# Patient Record
Sex: MALE | Race: WHITE | NOT HISPANIC OR LATINO | Employment: FULL TIME | URBAN - METROPOLITAN AREA
[De-identification: names, ages, dates, MRNs, and addresses within clinical notes are randomized per-mention and may not be internally consistent; named-entity substitution may affect disease eponyms.]

---

## 2017-01-27 ENCOUNTER — APPOINTMENT (OUTPATIENT)
Dept: LAB | Facility: CLINIC | Age: 42
End: 2017-01-27
Payer: COMMERCIAL

## 2017-01-27 ENCOUNTER — ALLSCRIPTS OFFICE VISIT (OUTPATIENT)
Dept: OTHER | Facility: OTHER | Age: 42
End: 2017-01-27

## 2017-01-27 ENCOUNTER — GENERIC CONVERSION - ENCOUNTER (OUTPATIENT)
Dept: OTHER | Facility: OTHER | Age: 42
End: 2017-01-27

## 2017-01-27 DIAGNOSIS — E06.3 AUTOIMMUNE THYROIDITIS: ICD-10-CM

## 2017-01-27 LAB
T4 FREE SERPL-MCNC: 0.91 NG/DL (ref 0.76–1.46)
TSH SERPL DL<=0.05 MIU/L-ACNC: 6.76 UIU/ML (ref 0.36–3.74)

## 2017-01-27 PROCEDURE — 36415 COLL VENOUS BLD VENIPUNCTURE: CPT

## 2017-01-27 PROCEDURE — 84443 ASSAY THYROID STIM HORMONE: CPT

## 2017-01-27 PROCEDURE — 84439 ASSAY OF FREE THYROXINE: CPT

## 2017-02-23 ENCOUNTER — ALLSCRIPTS OFFICE VISIT (OUTPATIENT)
Dept: OTHER | Facility: OTHER | Age: 42
End: 2017-02-23

## 2017-02-27 ENCOUNTER — TRANSCRIBE ORDERS (OUTPATIENT)
Dept: ADMINISTRATIVE | Facility: HOSPITAL | Age: 42
End: 2017-02-27

## 2017-02-27 ENCOUNTER — APPOINTMENT (OUTPATIENT)
Dept: LAB | Facility: HOSPITAL | Age: 42
End: 2017-02-27
Payer: COMMERCIAL

## 2017-02-27 DIAGNOSIS — E03.9 HYPOTHYROIDISM: ICD-10-CM

## 2017-02-27 DIAGNOSIS — E06.3 AUTOIMMUNE THYROIDITIS: ICD-10-CM

## 2017-02-27 LAB
T4 FREE SERPL-MCNC: 0.84 NG/DL (ref 0.76–1.46)
TSH SERPL DL<=0.05 MIU/L-ACNC: 5.72 UIU/ML (ref 0.36–3.74)

## 2017-02-27 PROCEDURE — 84439 ASSAY OF FREE THYROXINE: CPT

## 2017-02-27 PROCEDURE — 84443 ASSAY THYROID STIM HORMONE: CPT

## 2017-02-27 PROCEDURE — 36415 COLL VENOUS BLD VENIPUNCTURE: CPT

## 2017-02-28 ENCOUNTER — GENERIC CONVERSION - ENCOUNTER (OUTPATIENT)
Dept: OTHER | Facility: OTHER | Age: 42
End: 2017-02-28

## 2017-04-18 ENCOUNTER — APPOINTMENT (OUTPATIENT)
Dept: LAB | Facility: CLINIC | Age: 42
End: 2017-04-18
Payer: COMMERCIAL

## 2017-04-18 ENCOUNTER — GENERIC CONVERSION - ENCOUNTER (OUTPATIENT)
Dept: OTHER | Facility: OTHER | Age: 42
End: 2017-04-18

## 2017-04-18 DIAGNOSIS — E06.3 AUTOIMMUNE THYROIDITIS: ICD-10-CM

## 2017-04-18 LAB — TSH SERPL DL<=0.05 MIU/L-ACNC: 2.63 UIU/ML (ref 0.36–3.74)

## 2017-04-18 PROCEDURE — 36415 COLL VENOUS BLD VENIPUNCTURE: CPT

## 2017-04-18 PROCEDURE — 84443 ASSAY THYROID STIM HORMONE: CPT

## 2017-05-03 ENCOUNTER — HOSPITAL ENCOUNTER (OUTPATIENT)
Dept: NON INVASIVE DIAGNOSTICS | Facility: CLINIC | Age: 42
Discharge: HOME | End: 2017-05-03
Payer: COMMERCIAL

## 2017-05-03 DIAGNOSIS — I83.811 VARICOSE VEINS OF RIGHT LOWER EXTREMITY WITH PAIN: ICD-10-CM

## 2017-05-03 PROCEDURE — 93970 EXTREMITY STUDY: CPT

## 2017-05-23 DIAGNOSIS — I87.2 VENOUS INSUFFICIENCY (CHRONIC) (PERIPHERAL): ICD-10-CM

## 2017-05-23 DIAGNOSIS — I83.811 VARICOSE VEINS OF RIGHT LOWER EXTREMITY WITH PAIN: ICD-10-CM

## 2017-05-23 DIAGNOSIS — I83.893 VARICOSE VEINS OF BILATERAL LOWER EXTREMITIES WITH OTHER COMPLICATIONS: ICD-10-CM

## 2017-06-06 ENCOUNTER — ALLSCRIPTS OFFICE VISIT (OUTPATIENT)
Dept: OTHER | Facility: OTHER | Age: 42
End: 2017-06-06

## 2017-07-18 ENCOUNTER — GENERIC CONVERSION - ENCOUNTER (OUTPATIENT)
Dept: OTHER | Facility: OTHER | Age: 42
End: 2017-07-18

## 2017-07-24 ENCOUNTER — TRANSCRIBE ORDERS (OUTPATIENT)
Dept: ADMINISTRATIVE | Facility: HOSPITAL | Age: 42
End: 2017-07-24

## 2017-07-24 ENCOUNTER — APPOINTMENT (OUTPATIENT)
Dept: LAB | Facility: HOSPITAL | Age: 42
End: 2017-07-24
Payer: COMMERCIAL

## 2017-07-24 DIAGNOSIS — Z00.8 HEALTH EXAMINATION IN POPULATION SURVEY: Primary | ICD-10-CM

## 2017-07-24 DIAGNOSIS — Z00.8 HEALTH EXAMINATION IN POPULATION SURVEY: ICD-10-CM

## 2017-07-24 LAB
CHOLEST SERPL-MCNC: 223 MG/DL (ref 50–200)
EST. AVERAGE GLUCOSE BLD GHB EST-MCNC: 111 MG/DL
HBA1C MFR BLD: 5.5 % (ref 4.2–6.3)
HDLC SERPL-MCNC: 44 MG/DL (ref 40–60)
LDLC SERPL CALC-MCNC: 137 MG/DL (ref 0–100)
TRIGL SERPL-MCNC: 212 MG/DL

## 2017-07-24 PROCEDURE — 83036 HEMOGLOBIN GLYCOSYLATED A1C: CPT

## 2017-07-24 PROCEDURE — 80061 LIPID PANEL: CPT

## 2017-07-24 PROCEDURE — 36415 COLL VENOUS BLD VENIPUNCTURE: CPT

## 2017-08-03 ENCOUNTER — GENERIC CONVERSION - ENCOUNTER (OUTPATIENT)
Dept: OTHER | Facility: OTHER | Age: 42
End: 2017-08-03

## 2017-08-08 DIAGNOSIS — E06.3 AUTOIMMUNE THYROIDITIS: ICD-10-CM

## 2017-08-08 DIAGNOSIS — I83.893 VARICOSE VEINS OF BILATERAL LOWER EXTREMITIES WITH OTHER COMPLICATIONS: ICD-10-CM

## 2017-08-26 ENCOUNTER — GENERIC CONVERSION - ENCOUNTER (OUTPATIENT)
Dept: OTHER | Facility: OTHER | Age: 42
End: 2017-08-26

## 2017-08-26 ENCOUNTER — APPOINTMENT (EMERGENCY)
Dept: CT IMAGING | Facility: HOSPITAL | Age: 42
End: 2017-08-26
Payer: COMMERCIAL

## 2017-08-26 ENCOUNTER — HOSPITAL ENCOUNTER (EMERGENCY)
Facility: HOSPITAL | Age: 42
Discharge: HOME | End: 2017-08-26
Attending: EMERGENCY MEDICINE | Admitting: EMERGENCY MEDICINE
Payer: COMMERCIAL

## 2017-08-26 VITALS
TEMPERATURE: 98.5 F | RESPIRATION RATE: 18 BRPM | HEART RATE: 73 BPM | BODY MASS INDEX: 38.43 KG/M2 | WEIGHT: 290 LBS | HEIGHT: 73 IN | DIASTOLIC BLOOD PRESSURE: 95 MMHG | OXYGEN SATURATION: 97 % | SYSTOLIC BLOOD PRESSURE: 132 MMHG

## 2017-08-26 DIAGNOSIS — K42.9 UMBILICAL HERNIA: Primary | ICD-10-CM

## 2017-08-26 LAB
ALBUMIN SERPL BCP-MCNC: 4 G/DL (ref 3.5–5)
ALP SERPL-CCNC: 81 U/L (ref 46–116)
ALT SERPL W P-5'-P-CCNC: 74 U/L (ref 12–78)
ANION GAP SERPL CALCULATED.3IONS-SCNC: 9 MMOL/L (ref 4–13)
AST SERPL W P-5'-P-CCNC: 40 U/L (ref 5–45)
BASOPHILS # BLD AUTO: 0.04 THOUSANDS/ΜL (ref 0–0.1)
BASOPHILS NFR BLD AUTO: 0 % (ref 0–1)
BILIRUB SERPL-MCNC: 0.5 MG/DL (ref 0.2–1)
BILIRUB UR QL STRIP: NEGATIVE
BUN SERPL-MCNC: 13 MG/DL (ref 5–25)
CALCIUM SERPL-MCNC: 9.3 MG/DL (ref 8.3–10.1)
CHLORIDE SERPL-SCNC: 101 MMOL/L (ref 100–108)
CLARITY UR: CLEAR
CO2 SERPL-SCNC: 29 MMOL/L (ref 21–32)
COLOR UR: YELLOW
CREAT SERPL-MCNC: 1.17 MG/DL (ref 0.6–1.3)
EOSINOPHIL # BLD AUTO: 0.12 THOUSAND/ΜL (ref 0–0.61)
EOSINOPHIL NFR BLD AUTO: 1 % (ref 0–6)
ERYTHROCYTE [DISTWIDTH] IN BLOOD BY AUTOMATED COUNT: 12.6 % (ref 11.6–15.1)
GFR SERPL CREATININE-BSD FRML MDRD: 78 ML/MIN/1.73SQ M
GLUCOSE SERPL-MCNC: 113 MG/DL (ref 65–140)
GLUCOSE UR STRIP-MCNC: NEGATIVE MG/DL
HCT VFR BLD AUTO: 40.7 % (ref 36.5–49.3)
HGB BLD-MCNC: 14.1 G/DL (ref 12–17)
HGB UR QL STRIP.AUTO: NEGATIVE
KETONES UR STRIP-MCNC: NEGATIVE MG/DL
LACTATE SERPL-SCNC: 1.3 MMOL/L (ref 0.5–2)
LEUKOCYTE ESTERASE UR QL STRIP: NEGATIVE
LIPASE SERPL-CCNC: 128 U/L (ref 73–393)
LYMPHOCYTES # BLD AUTO: 2.09 THOUSANDS/ΜL (ref 0.6–4.47)
LYMPHOCYTES NFR BLD AUTO: 22 % (ref 14–44)
MCH RBC QN AUTO: 29.4 PG (ref 26.8–34.3)
MCHC RBC AUTO-ENTMCNC: 34.6 G/DL (ref 31.4–37.4)
MCV RBC AUTO: 85 FL (ref 82–98)
MONOCYTES # BLD AUTO: 0.65 THOUSAND/ΜL (ref 0.17–1.22)
MONOCYTES NFR BLD AUTO: 7 % (ref 4–12)
NEUTROPHILS # BLD AUTO: 6.62 THOUSANDS/ΜL (ref 1.85–7.62)
NEUTS SEG NFR BLD AUTO: 69 % (ref 43–75)
NITRITE UR QL STRIP: NEGATIVE
NRBC BLD AUTO-RTO: 0 /100 WBCS
PH UR STRIP.AUTO: 6.5 [PH] (ref 4.5–8)
PLATELET # BLD AUTO: 187 THOUSANDS/UL (ref 149–390)
PMV BLD AUTO: 11.2 FL (ref 8.9–12.7)
POTASSIUM SERPL-SCNC: 3.9 MMOL/L (ref 3.5–5.3)
PROT SERPL-MCNC: 7.4 G/DL (ref 6.4–8.2)
PROT UR STRIP-MCNC: NEGATIVE MG/DL
RBC # BLD AUTO: 4.8 MILLION/UL (ref 3.88–5.62)
SODIUM SERPL-SCNC: 139 MMOL/L (ref 136–145)
SP GR UR STRIP.AUTO: 1.02 (ref 1–1.03)
UROBILINOGEN UR QL STRIP.AUTO: 0.2 E.U./DL
WBC # BLD AUTO: 9.55 THOUSAND/UL (ref 4.31–10.16)

## 2017-08-26 PROCEDURE — 74177 CT ABD & PELVIS W/CONTRAST: CPT

## 2017-08-26 PROCEDURE — 83690 ASSAY OF LIPASE: CPT | Performed by: EMERGENCY MEDICINE

## 2017-08-26 PROCEDURE — 36415 COLL VENOUS BLD VENIPUNCTURE: CPT | Performed by: EMERGENCY MEDICINE

## 2017-08-26 PROCEDURE — 81003 URINALYSIS AUTO W/O SCOPE: CPT | Performed by: EMERGENCY MEDICINE

## 2017-08-26 PROCEDURE — 99284 EMERGENCY DEPT VISIT MOD MDM: CPT

## 2017-08-26 PROCEDURE — 83605 ASSAY OF LACTIC ACID: CPT | Performed by: EMERGENCY MEDICINE

## 2017-08-26 PROCEDURE — 80053 COMPREHEN METABOLIC PANEL: CPT | Performed by: EMERGENCY MEDICINE

## 2017-08-26 PROCEDURE — 93005 ELECTROCARDIOGRAM TRACING: CPT | Performed by: EMERGENCY MEDICINE

## 2017-08-26 PROCEDURE — 85025 COMPLETE CBC W/AUTO DIFF WBC: CPT | Performed by: EMERGENCY MEDICINE

## 2017-08-26 RX ORDER — ACETAMINOPHEN 325 MG/1
975 TABLET ORAL ONCE
Status: COMPLETED | OUTPATIENT
Start: 2017-08-26 | End: 2017-08-26

## 2017-08-26 RX ORDER — LEVOTHYROXINE SODIUM 0.1 MG/1
100 TABLET ORAL DAILY
COMMUNITY
End: 2018-02-07

## 2017-08-26 RX ADMIN — IOHEXOL 100 ML: 350 INJECTION, SOLUTION INTRAVENOUS at 22:20

## 2017-08-26 RX ADMIN — ACETAMINOPHEN 975 MG: 325 TABLET ORAL at 22:33

## 2017-08-28 LAB
ATRIAL RATE: 92 BPM
P AXIS: 50 DEGREES
PR INTERVAL: 160 MS
QRS AXIS: 47 DEGREES
QRSD INTERVAL: 92 MS
QT INTERVAL: 374 MS
QTC INTERVAL: 462 MS
T WAVE AXIS: 43 DEGREES
VENTRICULAR RATE: 92 BPM

## 2017-09-05 ENCOUNTER — ALLSCRIPTS OFFICE VISIT (OUTPATIENT)
Dept: OTHER | Facility: OTHER | Age: 42
End: 2017-09-05

## 2017-09-06 ENCOUNTER — ALLSCRIPTS OFFICE VISIT (OUTPATIENT)
Dept: OTHER | Facility: OTHER | Age: 42
End: 2017-09-06

## 2017-09-23 ENCOUNTER — APPOINTMENT (OUTPATIENT)
Dept: LAB | Facility: HOSPITAL | Age: 42
End: 2017-09-23
Attending: SURGERY
Payer: COMMERCIAL

## 2017-09-23 DIAGNOSIS — I83.893 VARICOSE VEINS OF BILATERAL LOWER EXTREMITIES WITH OTHER COMPLICATIONS: ICD-10-CM

## 2017-09-23 DIAGNOSIS — I87.2 VENOUS INSUFFICIENCY (CHRONIC) (PERIPHERAL): ICD-10-CM

## 2017-09-23 DIAGNOSIS — E06.3 AUTOIMMUNE THYROIDITIS: ICD-10-CM

## 2017-09-23 LAB
ANION GAP SERPL CALCULATED.3IONS-SCNC: 9 MMOL/L (ref 4–13)
BUN SERPL-MCNC: 13 MG/DL (ref 5–25)
CALCIUM SERPL-MCNC: 9.2 MG/DL (ref 8.3–10.1)
CHLORIDE SERPL-SCNC: 103 MMOL/L (ref 100–108)
CO2 SERPL-SCNC: 25 MMOL/L (ref 21–32)
CREAT SERPL-MCNC: 1.04 MG/DL (ref 0.6–1.3)
ERYTHROCYTE [DISTWIDTH] IN BLOOD BY AUTOMATED COUNT: 12.3 % (ref 11.6–15.1)
GFR SERPL CREATININE-BSD FRML MDRD: 89 ML/MIN/1.73SQ M
GLUCOSE P FAST SERPL-MCNC: 101 MG/DL (ref 65–99)
HCT VFR BLD AUTO: 43.3 % (ref 36.5–49.3)
HGB BLD-MCNC: 15 G/DL (ref 12–17)
INR PPP: 0.92 (ref 0.86–1.16)
MCH RBC QN AUTO: 29.1 PG (ref 26.8–34.3)
MCHC RBC AUTO-ENTMCNC: 34.6 G/DL (ref 31.4–37.4)
MCV RBC AUTO: 84 FL (ref 82–98)
PLATELET # BLD AUTO: 207 THOUSANDS/UL (ref 149–390)
PMV BLD AUTO: 11.3 FL (ref 8.9–12.7)
POTASSIUM SERPL-SCNC: 4.8 MMOL/L (ref 3.5–5.3)
PROTHROMBIN TIME: 12.6 SECONDS (ref 12.1–14.4)
RBC # BLD AUTO: 5.15 MILLION/UL (ref 3.88–5.62)
SODIUM SERPL-SCNC: 137 MMOL/L (ref 136–145)
TSH SERPL DL<=0.05 MIU/L-ACNC: 3.24 UIU/ML (ref 0.36–3.74)
WBC # BLD AUTO: 8.09 THOUSAND/UL (ref 4.31–10.16)

## 2017-09-23 PROCEDURE — 85610 PROTHROMBIN TIME: CPT

## 2017-09-23 PROCEDURE — 80048 BASIC METABOLIC PNL TOTAL CA: CPT

## 2017-09-23 PROCEDURE — 36415 COLL VENOUS BLD VENIPUNCTURE: CPT

## 2017-09-23 PROCEDURE — 84443 ASSAY THYROID STIM HORMONE: CPT

## 2017-09-23 PROCEDURE — 85027 COMPLETE CBC AUTOMATED: CPT

## 2017-09-25 ENCOUNTER — ANESTHESIA EVENT (OUTPATIENT)
Dept: PERIOP | Facility: HOSPITAL | Age: 42
End: 2017-09-25
Payer: COMMERCIAL

## 2017-09-25 ENCOUNTER — GENERIC CONVERSION - ENCOUNTER (OUTPATIENT)
Dept: OTHER | Facility: OTHER | Age: 42
End: 2017-09-25

## 2017-09-26 ENCOUNTER — APPOINTMENT (OUTPATIENT)
Dept: NON INVASIVE DIAGNOSTICS | Facility: HOSPITAL | Age: 42
End: 2017-09-26
Payer: COMMERCIAL

## 2017-09-26 ENCOUNTER — ANESTHESIA (OUTPATIENT)
Dept: PERIOP | Facility: HOSPITAL | Age: 42
End: 2017-09-26
Payer: COMMERCIAL

## 2017-09-26 ENCOUNTER — HOSPITAL ENCOUNTER (OUTPATIENT)
Facility: HOSPITAL | Age: 42
Setting detail: OUTPATIENT SURGERY
Discharge: HOME | End: 2017-09-26
Attending: SURGERY | Admitting: SURGERY
Payer: COMMERCIAL

## 2017-09-26 VITALS
HEIGHT: 73 IN | RESPIRATION RATE: 16 BRPM | BODY MASS INDEX: 38.43 KG/M2 | HEART RATE: 76 BPM | TEMPERATURE: 98.2 F | SYSTOLIC BLOOD PRESSURE: 157 MMHG | OXYGEN SATURATION: 100 % | DIASTOLIC BLOOD PRESSURE: 77 MMHG | WEIGHT: 290 LBS

## 2017-09-26 DIAGNOSIS — I83.891 VARICOSE VEINS OF LOWER EXTREMITIES WITH COMPLICATIONS, RIGHT: ICD-10-CM

## 2017-09-26 PROCEDURE — 93971 EXTREMITY STUDY: CPT

## 2017-09-26 RX ORDER — SODIUM CHLORIDE, SODIUM LACTATE, POTASSIUM CHLORIDE, CALCIUM CHLORIDE 600; 310; 30; 20 MG/100ML; MG/100ML; MG/100ML; MG/100ML
INJECTION, SOLUTION INTRAVENOUS CONTINUOUS PRN
Status: DISCONTINUED | OUTPATIENT
Start: 2017-09-26 | End: 2017-09-26 | Stop reason: SURG

## 2017-09-26 RX ORDER — FENTANYL CITRATE 50 UG/ML
INJECTION, SOLUTION INTRAMUSCULAR; INTRAVENOUS AS NEEDED
Status: DISCONTINUED | OUTPATIENT
Start: 2017-09-26 | End: 2017-09-26 | Stop reason: SURG

## 2017-09-26 RX ORDER — LIDOCAINE HYDROCHLORIDE 10 MG/ML
INJECTION, SOLUTION INFILTRATION; PERINEURAL AS NEEDED
Status: DISCONTINUED | OUTPATIENT
Start: 2017-09-26 | End: 2017-09-26 | Stop reason: SURG

## 2017-09-26 RX ORDER — MIDAZOLAM HYDROCHLORIDE 1 MG/ML
INJECTION INTRAMUSCULAR; INTRAVENOUS AS NEEDED
Status: DISCONTINUED | OUTPATIENT
Start: 2017-09-26 | End: 2017-09-26 | Stop reason: SURG

## 2017-09-26 RX ORDER — PROPOFOL 10 MG/ML
INJECTION, EMULSION INTRAVENOUS AS NEEDED
Status: DISCONTINUED | OUTPATIENT
Start: 2017-09-26 | End: 2017-09-26 | Stop reason: SURG

## 2017-09-26 RX ORDER — SODIUM CHLORIDE, SODIUM LACTATE, POTASSIUM CHLORIDE, CALCIUM CHLORIDE 600; 310; 30; 20 MG/100ML; MG/100ML; MG/100ML; MG/100ML
20 INJECTION, SOLUTION INTRAVENOUS CONTINUOUS
Status: DISCONTINUED | OUTPATIENT
Start: 2017-09-26 | End: 2017-09-26 | Stop reason: HOSPADM

## 2017-09-26 RX ORDER — FENTANYL CITRATE/PF 50 MCG/ML
50 SYRINGE (ML) INJECTION
Status: DISCONTINUED | OUTPATIENT
Start: 2017-09-26 | End: 2017-09-26 | Stop reason: HOSPADM

## 2017-09-26 RX ORDER — GLYCOPYRROLATE 0.2 MG/ML
INJECTION INTRAMUSCULAR; INTRAVENOUS AS NEEDED
Status: DISCONTINUED | OUTPATIENT
Start: 2017-09-26 | End: 2017-09-26 | Stop reason: SURG

## 2017-09-26 RX ORDER — ONDANSETRON 2 MG/ML
INJECTION INTRAMUSCULAR; INTRAVENOUS AS NEEDED
Status: DISCONTINUED | OUTPATIENT
Start: 2017-09-26 | End: 2017-09-26 | Stop reason: SURG

## 2017-09-26 RX ORDER — ONDANSETRON 2 MG/ML
4 INJECTION INTRAMUSCULAR; INTRAVENOUS EVERY 6 HOURS PRN
Status: DISCONTINUED | OUTPATIENT
Start: 2017-09-26 | End: 2017-09-26 | Stop reason: HOSPADM

## 2017-09-26 RX ORDER — OXYCODONE HYDROCHLORIDE AND ACETAMINOPHEN 5; 325 MG/1; MG/1
1 TABLET ORAL EVERY 4 HOURS PRN
Status: DISCONTINUED | OUTPATIENT
Start: 2017-09-26 | End: 2017-09-26 | Stop reason: HOSPADM

## 2017-09-26 RX ORDER — CHLORHEXIDINE GLUCONATE 0.12 MG/ML
15 RINSE ORAL ONCE
Status: DISCONTINUED | OUTPATIENT
Start: 2017-09-26 | End: 2017-09-26

## 2017-09-26 RX ADMIN — PROPOFOL 200 MG: 10 INJECTION, EMULSION INTRAVENOUS at 08:18

## 2017-09-26 RX ADMIN — FENTANYL CITRATE 25 MCG: 50 INJECTION, SOLUTION INTRAMUSCULAR; INTRAVENOUS at 08:50

## 2017-09-26 RX ADMIN — GLYCOPYRROLATE 0.2 MG: 0.2 INJECTION, SOLUTION INTRAMUSCULAR; INTRAVENOUS at 08:17

## 2017-09-26 RX ADMIN — ONDANSETRON 4 MG: 2 INJECTION INTRAMUSCULAR; INTRAVENOUS at 08:14

## 2017-09-26 RX ADMIN — CEFAZOLIN SODIUM 3000 MG: 2 SOLUTION INTRAVENOUS at 08:26

## 2017-09-26 RX ADMIN — DEXAMETHASONE SODIUM PHOSPHATE 10 MG: 10 INJECTION INTRAMUSCULAR; INTRAVENOUS at 08:27

## 2017-09-26 RX ADMIN — SODIUM CHLORIDE, SODIUM LACTATE, POTASSIUM CHLORIDE, AND CALCIUM CHLORIDE: .6; .31; .03; .02 INJECTION, SOLUTION INTRAVENOUS at 08:13

## 2017-09-26 RX ADMIN — PROPOFOL 100 MG: 10 INJECTION, EMULSION INTRAVENOUS at 08:19

## 2017-09-26 RX ADMIN — MIDAZOLAM HYDROCHLORIDE 2 MG: 1 INJECTION, SOLUTION INTRAMUSCULAR; INTRAVENOUS at 08:13

## 2017-09-26 RX ADMIN — FENTANYL CITRATE 50 MCG: 50 INJECTION INTRAMUSCULAR; INTRAVENOUS at 11:24

## 2017-09-26 RX ADMIN — OXYCODONE HYDROCHLORIDE AND ACETAMINOPHEN 1 TABLET: 5; 325 TABLET ORAL at 12:19

## 2017-09-26 RX ADMIN — FENTANYL CITRATE 25 MCG: 50 INJECTION, SOLUTION INTRAMUSCULAR; INTRAVENOUS at 08:35

## 2017-09-26 RX ADMIN — FENTANYL CITRATE 50 MCG: 50 INJECTION, SOLUTION INTRAMUSCULAR; INTRAVENOUS at 08:20

## 2017-09-26 RX ADMIN — HYDROMORPHONE HYDROCHLORIDE 0.5 MG: 1 INJECTION, SOLUTION INTRAMUSCULAR; INTRAVENOUS; SUBCUTANEOUS at 11:44

## 2017-09-26 RX ADMIN — LIDOCAINE HYDROCHLORIDE 50 MG: 10 INJECTION, SOLUTION INFILTRATION; PERINEURAL at 08:18

## 2017-09-26 RX ADMIN — ONDANSETRON 4 MG: 2 INJECTION INTRAMUSCULAR; INTRAVENOUS at 13:54

## 2017-09-28 ENCOUNTER — ALLSCRIPTS OFFICE VISIT (OUTPATIENT)
Dept: OTHER | Facility: OTHER | Age: 42
End: 2017-09-28

## 2017-10-03 ENCOUNTER — GENERIC CONVERSION - ENCOUNTER (OUTPATIENT)
Dept: OTHER | Facility: OTHER | Age: 42
End: 2017-10-03

## 2017-10-03 ENCOUNTER — HOSPITAL ENCOUNTER (OUTPATIENT)
Dept: NON INVASIVE DIAGNOSTICS | Facility: CLINIC | Age: 42
Discharge: HOME | End: 2017-10-03
Payer: COMMERCIAL

## 2017-10-03 DIAGNOSIS — I83.893 VARICOSE VEINS OF BILATERAL LOWER EXTREMITIES WITH OTHER COMPLICATIONS: ICD-10-CM

## 2017-10-03 PROCEDURE — 93971 EXTREMITY STUDY: CPT

## 2017-10-10 ENCOUNTER — HOSPITAL ENCOUNTER (OUTPATIENT)
Dept: ULTRASOUND IMAGING | Facility: HOSPITAL | Age: 42
Discharge: HOME | End: 2017-10-10
Payer: COMMERCIAL

## 2017-10-10 DIAGNOSIS — I83.893 VARICOSE VEINS OF BILATERAL LOWER EXTREMITIES WITH OTHER COMPLICATIONS: ICD-10-CM

## 2017-10-10 DIAGNOSIS — Z01.818 ENCOUNTER FOR OTHER PREPROCEDURAL EXAMINATION: ICD-10-CM

## 2017-10-10 DIAGNOSIS — I87.2 VENOUS INSUFFICIENCY (CHRONIC) (PERIPHERAL): ICD-10-CM

## 2017-10-10 DIAGNOSIS — I82.419: ICD-10-CM

## 2017-10-10 PROCEDURE — 93971 EXTREMITY STUDY: CPT

## 2017-10-16 ENCOUNTER — GENERIC CONVERSION - ENCOUNTER (OUTPATIENT)
Dept: OTHER | Facility: OTHER | Age: 42
End: 2017-10-16

## 2017-10-24 ENCOUNTER — GENERIC CONVERSION - ENCOUNTER (OUTPATIENT)
Dept: OTHER | Facility: OTHER | Age: 42
End: 2017-10-24

## 2017-10-24 ENCOUNTER — HOSPITAL ENCOUNTER (OUTPATIENT)
Dept: ULTRASOUND IMAGING | Facility: HOSPITAL | Age: 42
Discharge: HOME | End: 2017-10-24
Attending: SURGERY
Payer: COMMERCIAL

## 2017-10-24 DIAGNOSIS — I87.2 VENOUS INSUFFICIENCY (CHRONIC) (PERIPHERAL): ICD-10-CM

## 2017-10-24 DIAGNOSIS — Z01.818 ENCOUNTER FOR OTHER PREPROCEDURAL EXAMINATION: ICD-10-CM

## 2017-10-24 DIAGNOSIS — I83.893 VARICOSE VEINS OF BILATERAL LOWER EXTREMITIES WITH OTHER COMPLICATIONS: ICD-10-CM

## 2017-10-24 PROCEDURE — 93971 EXTREMITY STUDY: CPT

## 2017-11-30 ENCOUNTER — ANESTHESIA EVENT (OUTPATIENT)
Dept: PERIOP | Facility: HOSPITAL | Age: 42
End: 2017-11-30
Payer: COMMERCIAL

## 2017-11-30 NOTE — ANESTHESIA PREPROCEDURE EVALUATION
Review of Systems/Medical History  Patient summary reviewed  Chart reviewed  No history of anesthetic complications     Cardiovascular  DVT (Right leg DVT  post varicose vein ablation 9/2017, placed on Xarelto for 20 days)   Pulmonary  Smoker ex-smoker , ,        GI/Hepatic  Negative GI/hepatic ROS     Comment: Umbilical/Ventral hernias       Comment: Hx spermatocele     Endo/Other  History of thyroid disease (Hashimoto's thyroiditis) , hypothyroidism,   Comment: Hx Lyme dz  Hx varicose veins--s/p ablation   GYN  Negative gynecology ROS          Hematology  Negative hematology ROS      Musculoskeletal  Obesity (BMI 38)  morbid obesity,        Neurology  Negative neurology ROS      Psychology   Negative psychology ROS            Physical Exam    Airway    Mallampati score: II  TM Distance: >3 FB  Neck ROM: full     Dental   Comment: Missing tooth,     Cardiovascular  Rhythm: regular, Rate: normal,     Pulmonary  Breath sounds clear to auscultation,     Other Findings        Anesthesia Plan  ASA Score- 2       Anesthesia Type- general with ASA Monitors  Additional Monitors:   Airway Plan: ETT  Induction- intravenous  Informed Consent- Anesthetic plan and risks discussed with patient  I personally reviewed this patient with the CRNA  Discussed and agreed on the Anesthesia Plan with the CRNA  Kianna Urena

## 2017-12-01 ENCOUNTER — HOSPITAL ENCOUNTER (OUTPATIENT)
Facility: HOSPITAL | Age: 42
Setting detail: OUTPATIENT SURGERY
Discharge: HOME | End: 2017-12-01
Attending: SURGERY | Admitting: SURGERY
Payer: COMMERCIAL

## 2017-12-01 ENCOUNTER — ANESTHESIA (OUTPATIENT)
Dept: PERIOP | Facility: HOSPITAL | Age: 42
End: 2017-12-01
Payer: COMMERCIAL

## 2017-12-01 VITALS
SYSTOLIC BLOOD PRESSURE: 154 MMHG | HEART RATE: 88 BPM | HEIGHT: 73 IN | WEIGHT: 290 LBS | OXYGEN SATURATION: 94 % | TEMPERATURE: 100.3 F | DIASTOLIC BLOOD PRESSURE: 101 MMHG | RESPIRATION RATE: 16 BRPM | BODY MASS INDEX: 38.43 KG/M2

## 2017-12-01 PROCEDURE — C1781 MESH (IMPLANTABLE): HCPCS | Performed by: SURGERY

## 2017-12-01 DEVICE — VENTRALIGHT ST MESH WITH ECHO PS POSITONING SYSTEM
Type: IMPLANTABLE DEVICE | Site: UMBILICAL | Status: FUNCTIONAL
Brand: VENTRALIGHT ST MESH WITH ECHO PS POSITONING SYSTEM

## 2017-12-01 RX ORDER — LIDOCAINE HYDROCHLORIDE 10 MG/ML
INJECTION, SOLUTION INFILTRATION; PERINEURAL AS NEEDED
Status: DISCONTINUED | OUTPATIENT
Start: 2017-12-01 | End: 2017-12-01 | Stop reason: SURG

## 2017-12-01 RX ORDER — HYDROMORPHONE HYDROCHLORIDE 2 MG/ML
INJECTION, SOLUTION INTRAMUSCULAR; INTRAVENOUS; SUBCUTANEOUS AS NEEDED
Status: DISCONTINUED | OUTPATIENT
Start: 2017-12-01 | End: 2017-12-01 | Stop reason: SURG

## 2017-12-01 RX ORDER — FENTANYL CITRATE/PF 50 MCG/ML
50 SYRINGE (ML) INJECTION
Status: DISCONTINUED | OUTPATIENT
Start: 2017-12-01 | End: 2017-12-01 | Stop reason: HOSPADM

## 2017-12-01 RX ORDER — ALBUMIN, HUMAN INJ 5% 5 %
SOLUTION INTRAVENOUS CONTINUOUS PRN
Status: DISCONTINUED | OUTPATIENT
Start: 2017-12-01 | End: 2017-12-01 | Stop reason: SURG

## 2017-12-01 RX ORDER — ONDANSETRON 2 MG/ML
INJECTION INTRAMUSCULAR; INTRAVENOUS AS NEEDED
Status: DISCONTINUED | OUTPATIENT
Start: 2017-12-01 | End: 2017-12-01 | Stop reason: SURG

## 2017-12-01 RX ORDER — METOCLOPRAMIDE HYDROCHLORIDE 5 MG/ML
INJECTION INTRAMUSCULAR; INTRAVENOUS AS NEEDED
Status: DISCONTINUED | OUTPATIENT
Start: 2017-12-01 | End: 2017-12-01 | Stop reason: SURG

## 2017-12-01 RX ORDER — FENTANYL CITRATE 50 UG/ML
INJECTION, SOLUTION INTRAMUSCULAR; INTRAVENOUS AS NEEDED
Status: DISCONTINUED | OUTPATIENT
Start: 2017-12-01 | End: 2017-12-01 | Stop reason: SURG

## 2017-12-01 RX ORDER — ONDANSETRON 2 MG/ML
4 INJECTION INTRAMUSCULAR; INTRAVENOUS ONCE AS NEEDED
Status: DISCONTINUED | OUTPATIENT
Start: 2017-12-01 | End: 2017-12-01 | Stop reason: HOSPADM

## 2017-12-01 RX ORDER — DOCUSATE SODIUM 100 MG/1
100 CAPSULE, LIQUID FILLED ORAL 2 TIMES DAILY
COMMUNITY
End: 2019-09-05

## 2017-12-01 RX ORDER — GLYCOPYRROLATE 0.2 MG/ML
INJECTION INTRAMUSCULAR; INTRAVENOUS AS NEEDED
Status: DISCONTINUED | OUTPATIENT
Start: 2017-12-01 | End: 2017-12-01 | Stop reason: SURG

## 2017-12-01 RX ORDER — SODIUM CHLORIDE 9 MG/ML
INJECTION, SOLUTION INTRAVENOUS CONTINUOUS PRN
Status: DISCONTINUED | OUTPATIENT
Start: 2017-12-01 | End: 2017-12-01 | Stop reason: SURG

## 2017-12-01 RX ORDER — METOCLOPRAMIDE HYDROCHLORIDE 5 MG/ML
10 INJECTION INTRAMUSCULAR; INTRAVENOUS ONCE AS NEEDED
Status: DISCONTINUED | OUTPATIENT
Start: 2017-12-01 | End: 2017-12-01 | Stop reason: HOSPADM

## 2017-12-01 RX ORDER — PROPOFOL 10 MG/ML
INJECTION, EMULSION INTRAVENOUS AS NEEDED
Status: DISCONTINUED | OUTPATIENT
Start: 2017-12-01 | End: 2017-12-01 | Stop reason: SURG

## 2017-12-01 RX ORDER — ROCURONIUM BROMIDE 10 MG/ML
INJECTION, SOLUTION INTRAVENOUS AS NEEDED
Status: DISCONTINUED | OUTPATIENT
Start: 2017-12-01 | End: 2017-12-01 | Stop reason: SURG

## 2017-12-01 RX ORDER — SODIUM CHLORIDE, SODIUM LACTATE, POTASSIUM CHLORIDE, CALCIUM CHLORIDE 600; 310; 30; 20 MG/100ML; MG/100ML; MG/100ML; MG/100ML
125 INJECTION, SOLUTION INTRAVENOUS CONTINUOUS
Status: DISCONTINUED | OUTPATIENT
Start: 2017-12-01 | End: 2017-12-01 | Stop reason: HOSPADM

## 2017-12-01 RX ORDER — MIDAZOLAM HYDROCHLORIDE 1 MG/ML
INJECTION INTRAMUSCULAR; INTRAVENOUS AS NEEDED
Status: DISCONTINUED | OUTPATIENT
Start: 2017-12-01 | End: 2017-12-01 | Stop reason: SURG

## 2017-12-01 RX ORDER — BUPIVACAINE HYDROCHLORIDE AND EPINEPHRINE 5; 5 MG/ML; UG/ML
INJECTION, SOLUTION PERINEURAL AS NEEDED
Status: DISCONTINUED | OUTPATIENT
Start: 2017-12-01 | End: 2017-12-01 | Stop reason: HOSPADM

## 2017-12-01 RX ORDER — MEPERIDINE HYDROCHLORIDE 25 MG/ML
12.5 INJECTION INTRAMUSCULAR; INTRAVENOUS; SUBCUTANEOUS ONCE AS NEEDED
Status: DISCONTINUED | OUTPATIENT
Start: 2017-12-01 | End: 2017-12-01 | Stop reason: HOSPADM

## 2017-12-01 RX ORDER — OXYCODONE HYDROCHLORIDE AND ACETAMINOPHEN 5; 325 MG/1; MG/1
1 TABLET ORAL EVERY 4 HOURS PRN
Status: DISCONTINUED | OUTPATIENT
Start: 2017-12-01 | End: 2017-12-01 | Stop reason: HOSPADM

## 2017-12-01 RX ORDER — ONDANSETRON 2 MG/ML
4 INJECTION INTRAMUSCULAR; INTRAVENOUS EVERY 4 HOURS PRN
Status: DISCONTINUED | OUTPATIENT
Start: 2017-12-01 | End: 2017-12-01 | Stop reason: HOSPADM

## 2017-12-01 RX ORDER — OXYCODONE HYDROCHLORIDE AND ACETAMINOPHEN 5; 325 MG/1; MG/1
2 TABLET ORAL EVERY 4 HOURS PRN
Status: DISCONTINUED | OUTPATIENT
Start: 2017-12-01 | End: 2017-12-01 | Stop reason: HOSPADM

## 2017-12-01 RX ORDER — SODIUM CHLORIDE, SODIUM LACTATE, POTASSIUM CHLORIDE, CALCIUM CHLORIDE 600; 310; 30; 20 MG/100ML; MG/100ML; MG/100ML; MG/100ML
50 INJECTION, SOLUTION INTRAVENOUS CONTINUOUS
Status: DISCONTINUED | OUTPATIENT
Start: 2017-12-01 | End: 2017-12-01 | Stop reason: HOSPADM

## 2017-12-01 RX ORDER — OXYCODONE HYDROCHLORIDE AND ACETAMINOPHEN 5; 325 MG/1; MG/1
1 TABLET ORAL EVERY 4 HOURS PRN
Qty: 30 TABLET | Refills: 0
Start: 2017-12-01 | End: 2017-12-11

## 2017-12-01 RX ADMIN — DEXAMETHASONE SODIUM PHOSPHATE 10 MG: 10 INJECTION INTRAMUSCULAR; INTRAVENOUS at 14:30

## 2017-12-01 RX ADMIN — ONDANSETRON 4 MG: 2 INJECTION INTRAMUSCULAR; INTRAVENOUS at 17:35

## 2017-12-01 RX ADMIN — MIDAZOLAM HYDROCHLORIDE 2 MG: 1 INJECTION, SOLUTION INTRAMUSCULAR; INTRAVENOUS at 14:05

## 2017-12-01 RX ADMIN — FENTANYL CITRATE 50 MCG: 50 INJECTION INTRAMUSCULAR; INTRAVENOUS at 17:58

## 2017-12-01 RX ADMIN — ROCURONIUM BROMIDE 20 MG: 10 INJECTION INTRAVENOUS at 16:47

## 2017-12-01 RX ADMIN — HYDROMORPHONE HYDROCHLORIDE 1 MG: 2 INJECTION, SOLUTION INTRAMUSCULAR; INTRAVENOUS; SUBCUTANEOUS at 17:48

## 2017-12-01 RX ADMIN — ROCURONIUM BROMIDE 30 MG: 10 INJECTION INTRAVENOUS at 15:00

## 2017-12-01 RX ADMIN — SODIUM CHLORIDE, SODIUM LACTATE, POTASSIUM CHLORIDE, AND CALCIUM CHLORIDE 125 ML/HR: .6; .31; .03; .02 INJECTION, SOLUTION INTRAVENOUS at 13:30

## 2017-12-01 RX ADMIN — ONDANSETRON 4 MG: 2 INJECTION INTRAMUSCULAR; INTRAVENOUS at 14:05

## 2017-12-01 RX ADMIN — ALBUMIN HUMAN: 0.05 INJECTION, SOLUTION INTRAVENOUS at 15:40

## 2017-12-01 RX ADMIN — LIDOCAINE HYDROCHLORIDE 100 MG: 10 INJECTION, SOLUTION INFILTRATION; PERINEURAL at 14:12

## 2017-12-01 RX ADMIN — GLYCOPYRROLATE 0.1 MG: 0.2 INJECTION, SOLUTION INTRAMUSCULAR; INTRAVENOUS at 14:05

## 2017-12-01 RX ADMIN — NEOSTIGMINE METHYLSULFATE 5 MG: 1 INJECTION, SOLUTION INTRAMUSCULAR; INTRAVENOUS; SUBCUTANEOUS at 17:35

## 2017-12-01 RX ADMIN — ROCURONIUM BROMIDE 10 MG: 10 INJECTION INTRAVENOUS at 17:17

## 2017-12-01 RX ADMIN — FENTANYL CITRATE 100 MCG: 50 INJECTION, SOLUTION INTRAMUSCULAR; INTRAVENOUS at 14:12

## 2017-12-01 RX ADMIN — DEXMEDETOMIDINE HYDROCHLORIDE 1 MCG/KG/HR: 100 INJECTION, SOLUTION INTRAVENOUS at 14:30

## 2017-12-01 RX ADMIN — GLYCOPYRROLATE 0.8 MG: 0.2 INJECTION, SOLUTION INTRAMUSCULAR; INTRAVENOUS at 17:35

## 2017-12-01 RX ADMIN — SODIUM CHLORIDE, SODIUM LACTATE, POTASSIUM CHLORIDE, AND CALCIUM CHLORIDE: .6; .31; .03; .02 INJECTION, SOLUTION INTRAVENOUS at 13:31

## 2017-12-01 RX ADMIN — METOCLOPRAMIDE 10 MG: 5 INJECTION, SOLUTION INTRAMUSCULAR; INTRAVENOUS at 17:19

## 2017-12-01 RX ADMIN — ALBUMIN HUMAN: 0.05 INJECTION, SOLUTION INTRAVENOUS at 15:05

## 2017-12-01 RX ADMIN — OXYCODONE HYDROCHLORIDE AND ACETAMINOPHEN 2 TABLET: 5; 325 TABLET ORAL at 18:56

## 2017-12-01 RX ADMIN — CEFAZOLIN SODIUM 2000 MG: 2 SOLUTION INTRAVENOUS at 14:31

## 2017-12-01 RX ADMIN — FENTANYL CITRATE 50 MCG: 50 INJECTION INTRAMUSCULAR; INTRAVENOUS at 18:16

## 2017-12-01 RX ADMIN — CEFAZOLIN SODIUM 1000 MG: 1 SOLUTION INTRAVENOUS at 14:31

## 2017-12-01 RX ADMIN — SODIUM CHLORIDE: 0.9 INJECTION, SOLUTION INTRAVENOUS at 14:20

## 2017-12-01 RX ADMIN — ROCURONIUM BROMIDE 50 MG: 10 INJECTION INTRAVENOUS at 14:12

## 2017-12-01 RX ADMIN — PROPOFOL 200 MG: 10 INJECTION, EMULSION INTRAVENOUS at 14:12

## 2017-12-01 NOTE — DISCHARGE INSTRUCTIONS
Ventral Hernia Repair   AMBULATORY CARE:   A ventral hernia repair  is surgery to fix a ventral hernia  A ventral hernia may be repaired if the hernia is preventing blood flow to organs or blocking the intestines  It may be done laparoscopically or open  Laparoscopically means that your healthcare provider will use several small incisions to fix the hernia  In an open repair, your healthcare provider will make one incision to fix your hernia  How to prepare for a ventral hernia repair:   · Your healthcare provider will talk to you about how to prepare for surgery  He may tell you not to eat or drink anything after midnight on the day of your surgery  He will tell you what medicines to take or not take on the day of your surgery  You may need to stop taking blood thinners or aspirin several days to weeks before your surgery  You may need blood work, a CT scan, or an ultrasound before surgery  These tests take pictures of your hernia and help your healthcare provider plan your surgery  You may be given contrast liquid before the tests  Tell the healthcare provider if you have ever had an allergic reaction to contrast liquid  · You may need a bowel prep before surgery  A bowel prep is when you take medicine to help clean out the colon  This decreases your risk of infection if a hole is made in your intestines during surgery  You may be given an antibiotic through your IV to help prevent a bacterial infection  Arrange for someone to drive you home and stay with you after surgery  What will happen during a ventral hernia repair:   · You will be given general anesthesia to keep you asleep and free from pain during surgery  To fix the hernia laparoscopically, your healthcare provider will make a small incision above or to the side of your hernia, and insert a laparoscope  A laparoscope is a long metal tube with a light and camera on the end   He will insert other instruments by making 2 to 4 smaller incisions at different places on your abdomen  In an open hernia repair, your healthcare provider will make one large incision over your hernia  · In both types of hernia repair, tools are used to remove the sac that contains your organs or abdominal tissue  Next, your healthcare provider will move your organs or tissue back into its correct place  Stitches or mesh may be used to close or cover the opening in your abdominal wall  This may prevent your organs and tissues from bulging through it again  Your healthcare provider may close the incisions in your skin with stitches, medical glue, or strips of medical tape  What will happen after a ventral hernia repair:  Healthcare providers will monitor you until you are awake  You may be able to go home when your pain is controlled, you can drink liquids, and you can urinate  You may instead need to spend a night in the hospital  Mandieyuliana Ibarra will not be able to drive or lift anything heavy for one to two weeks  Risks of a ventral hernia repair:  Your organs, blood vessels, or nerves may get injured during the surgery  You may bleed more than expected or get an infection  A pocket of fluid may form under your skin  This may heal on its own or you may need surgery to remove it  Problems, such as a hole in your intestines, may happen during your laparoscopic repair that may lead to a laparotomy (open surgery)  Even after you have this surgery, there is a chance that you could have another hernia  You may get a blood clot in your leg or arm  This may become life-threatening  Call 911 for any of the following:   · You feel lightheaded, short of breath, and have chest pain  · You cough up blood  · You have trouble breathing  Seek care immediately if:   · Your arm or leg feels warm, tender, and painful  It may look swollen and red  · Blood soaks through your bandage      · Your abdomen feels hard and looks bigger than usual      · Your bowel movements are black, bloody, or tarry-looking  Contact your healthcare provider if:   · You have a fever above 101° F      · You develop a skin rash, hives, or itching  · Your incision is swollen, red, or draining pus or fluid  · You have nausea, or you are vomiting  · You cannot have a bowel movement  · Your pain does not get better after taking pain medicine  · You have questions or concerns about your condition or care  Medicines: You may need any of the following:  · Prescription pain medicine  may be given  Ask your healthcare provider how to take this medicine safely  · NSAIDs , such as ibuprofen, help decrease swelling, pain, and fever  This medicine is available with or without a doctor's order  NSAIDs can cause stomach bleeding or kidney problems in certain people  If you take blood thinner medicine, always ask your healthcare provider if NSAIDs are safe for you  Always read the medicine label and follow directions  · Take your medicine as directed  Contact your healthcare provider if you think your medicine is not helping or if you have side effects  Tell him or her if you are allergic to any medicine  Keep a list of the medicines, vitamins, and herbs you take  Include the amounts, and when and why you take them  Bring the list or the pill bottles to follow-up visits  Carry your medicine list with you in case of an emergency  Care for your wound as directed:  Carefully wash around your wound  It is okay to let soap and water run over your wound  Do not  scrub your wound  Dry the area and put on new, clean bandages as directed  Change your bandages when they get wet or dirty  If you have strips of medical tape over your incision, allow them to fall off on their own  Do not get in a bathtub, swimming pool, or hot tub until your healthcare provider says it is okay  Self-care:   · Eat a variety of healthy foods    Healthy foods include fruits, vegetables, whole-grain breads, low-fat dairy products, beans, lean provider as directed:  Write down your questions so you remember to ask them during your visits  © 2017 [employer] Information is for End User's use only and may not be sold, redistributed or otherwise used for commercial purposes  All illustrations and images included in CareNotes® are the copyrighted property of [employer]  or Prem Tinajero  The above information is an  only  It is not intended as medical advice for individual conditions or treatments  Talk to your doctor, nurse or pharmacist before following any medical regimen to see if it is safe and effective for you

## 2017-12-01 NOTE — PERIOPERATIVE NURSING NOTE
VSS, pt denies nausea, tolerating ice chips, reports improvement in pain, assessment unchanged, report called, no questions, pt transferred to [facility]

## 2017-12-01 NOTE — ANESTHESIA POSTPROCEDURE EVALUATION
Post-Op Assessment Note      CV Status:  Stable    Mental Status:  Alert and awake    Hydration Status:  Euvolemic    PONV Controlled:  Controlled    Airway Patency:  Patent    Post Op Vitals Reviewed: Yes          Staff: CRNA           /54 (12/01/17 1756)    Temp 97 5 °F (36 4 °C) (12/01/17 1756)    Pulse 74 (12/01/17 1756)   Resp 20 (12/01/17 1756)    SpO2 97 % (12/01/17 1756)

## 2017-12-01 NOTE — OP NOTE
OPERATIVE REPORT  PATIENT NAME: Angela Fitzpatrick    :  1975  MRN: [id number]  Pt Location: [facility] OR ROOM 14    SURGERY DATE: 2017    Surgeon(s) and Role:     * Miryam Ang MD - Primary     Garry Shelby - Assisting    Preop Diagnosis:  Umbilical hernia without obstruction or gangrene [K42 9]  Ventral hernia without obstruction or gangrene [K43 9]    Post-Op Diagnosis Codes:     * Umbilical hernia without obstruction or gangrene [K42 9]     * Ventral hernia without obstruction or gangrene [K43 9]    Procedure(s) (LRB):  ROBOTIC LAPAROSCOPIC UMBILCAL AND VENTRAL HERNIA REPAIR (N/A)    Specimen(s):  * No specimens in log *    Estimated Blood Loss:   50 mL    Drains:       Anesthesia Type:   Choice    Operative Indications:  Umbilical hernia without obstruction or gangrene [K42 9]  Ventral hernia without obstruction or gangrene [K43 9]      Operative Findings:  Ventral hernia defect merasured approximately 5cm x3cm repaired with 44CM mesh     Complications:   None    Procedure and Technique:  Patient was seen in preoperative holding, risks and benefits were again discussed with patient and most recent history of physical reviewed with no change  Patient was then brought back to the operating room and placed in supine position  He was identfiied by name and birthdate  General anesthesia was then achieved  A gallo catheter and SCD were then placed  A verase needle was then placed in the LUQ and pnuemoperitoneum was achieved without any changes in hemodynamic stability  A 5mm trocar was then inserted in the right lower quadrant with the visaport technique  Additional trocars were placed in the mid right abdomen and RUQ  The robot was then docked in usual steril fashion  The abdominal wall was inspected and the peritoneum as reflected to reveal a defect  Defect measured approximately 5cm x3cm  This was repaired primarly with a 1 Vlock suture    A BARD ventralex 15cm mesh was then inserted and  Secured in place with 2-0 V-lock running sutuers around the periphery of the mesh  The balooon was then removed  All porsts were removed, the fascia was closed with 0 vicryl suture and skin closed with 4-0 monocryl and glue  Dr Lalitha Bennett was present for the entire procedure       Patient Disposition:  PACU     SIGNATURE: Aston Ask  DATE: December 1, 2017  TIME: 5:53 PM

## 2017-12-13 ENCOUNTER — ALLSCRIPTS OFFICE VISIT (OUTPATIENT)
Dept: OTHER | Facility: OTHER | Age: 42
End: 2017-12-13

## 2017-12-14 NOTE — PROGRESS NOTES
Assessment    1  Postoperative examination (V67 00) (Z09)    Plan  Postoperative examination    · Follow-up visit in 1 month Evaluation and Treatment  Follow-up  Status: Hold For -Scheduling  Requested for: 12Axn6845   Ordered; For: Postoperative examination; Ordered By: Yuli Blandon Performed:  Due: 12DEI1034    Discussion/Summary    43-year-old white male status post robot ventral and umbilical hernia repair  Doing well  I told the RN the left side may be about the location of the sutures for the mesh in this should gradually resolve  Increase activity as feels comfortable  Back to work tomorrow  See back 1 month  The patient was counseled regarding instructions for management  The patient has the current Goals: Met  The patent has the current Barriers: None  Patient is able to Self-Care  Educational resources provided: None needed  Possible side effects of new medications were reviewed with the patient/guardian today  The treatment plan was reviewed with the patient/guardian  The patient/guardian understands and agrees with the treatment plan     Self Referrals: No Current surgical patient  Chief Complaint  p/o ventral and umbilical hernia repair  Post-Op  HPI: 43-year-old white male status post umbilical and ventral hernia repair robotically  Complains of pain and a tender area on the left side of his abdomen  Active Problems  1  Diabetes mellitus screening (V77 1) (Z13 1)   2  Dvt femoral (deep venous thrombosis) (453 41) (I82 419)   3  Hashimoto's thyroiditis (245 2) (E06 3)   4  Lipid screening (V77 91) (Z13 220)   5  Overweight (278 02) (E66 3)   6  Positive Lyme disease serology (795 79) (R76 8)   7  Spermatocele (608 1) (N43 40)   8  Symptomatic varicose veins of both lower extremities (454 8) (I83 893)   9  Testicular Lump Right (608 89)   10  Venous insufficiency of both lower extremities (459 81) (I87 2)   11  Visit for pre-operative examination (V72 84) (Z01 818)   12  Vitamin D deficiency (268 9) (E55 9)    Social History     · Alcohol Use (History)   · Denied: History of Drug Use   · Exercise Habits   · Former smoker (B58 33) (X25 949)   · Never A Smoker    Current Meds   1  Levothyroxine Sodium 100 MCG Oral Tablet; Take 1 tablet daily; Therapy: 52CTF9632 to (Evaluate:21Apr2018)  Requested for: 26Apr2017; Last Rx:26Apr2017 Ordered   2  Oxycodone-Acetaminophen 5-325 MG Oral Tablet; TAKE 1 TABLET EVERY 4 TO 6 HOURS AS NEEDED FOR PAIN; Therapy: 39SZD8691 to (Evaluate:11Jun2017); Last Rx:06Jun2017 Ordered   3  Xarelto 15 MG Oral Tablet; one tablet  BID for 21 days; Therapy: 11CWC8561 to (Last Rx:03Oct2017)  Requested for: 03Oct2017 Ordered    Allergies  1  No Known Drug Allergies  2  Seasonal    Vitals   Recorded: 40IIU8008 98:07ZE   Systolic 375   Diastolic 80   Height 6 ft 2 in   Weight 292 lb 6 oz   BMI Calculated 37 54   BSA Calculated 2 55       Physical Exam   Abdomen  Abdomen: Abnormal  -- Laparoscopic incisions right side healing well  Firmness at the midline hernia site  Tender area on the left side where he noted pain  Almost feels like a small cord at that location  Message  Return to work or school:   Laura Carmona is under my professional care  He was seen in my office on December 13, 2017   He is able to return to work on  December 14, 2017    He can perform work without limitations           Future Appointments    Date/Time Provider Specialty Site   02/07/2018 09:30 AM Shi Pierre MD [address] [place] [address]   01/10/2018 10:30 AM Yesika Bradley MD General Surgery [address]       Signatures   Electronically signed by : Luicus Somers MD; Dec 13 2017 11:08AM EST                       (Author)    Electronically signed by : Lucius Somers MD; Dec 13 2017 11:10AM EST                       (Author)

## 2018-01-10 NOTE — RESULT NOTES
Message    TSH elevated, increase Levothyroxine 75 mcg to 1 tab daily, please verify dose? Make sure doses were not skipped and how he is taking his medication? It should be in am on empty stomach with water, wait 45 to 60 minutes before eating and make sure no vitamins or iron within 4 hours of dose  Then repeat TSH and free t4 in 6 weeks  Last D level improved, at goal, continue to take 1 capsule a week and then repeat labs in 3 months  He needs f/u appointment  Verified Results  (1) T4, FREE 01Aug2016 10:22AM [facility] Order Number: [id number]     Test Name Result Flag Reference   T4,FREE 0 81 ng/dL  0 76-1 46     (1) TSH 01Aug2016 10:22AM [facility] Order Number: [id number]     Test Name Result Flag Reference   TSH 3 851 uIU/mL H 0 358-3 740   Patients undergoing fluorescein dye angiography may retain small amounts of fluorescein in the body for 48-72 hours post procedure  Samples containing fluorescein can produce falsely depressed TSH values  If the patient had this procedure,a specimen should be resubmitted post fluorescein clearance  (1) PTH N-TERMINAL (INTACT) 01Aug2016 10:22AM [facility] Order Number: [id number]     Test Name Result Flag Reference   PARATHYROID HORMONE INTACT 62 4 pg/mL  14 0-72 0     (1) RENAL FUNCTION PANEL 01Aug2016 10:22AM [facility] Order Number: [id number]     Test Name Result Flag Reference   ANION GAP (CALC) 10 mmol/L  4-13   ALBUMIN 4 2 g/dL  3 5-5 0   BLOOD UREA NITROGEN 15 mg/dL  5-25   National Kidney Disease Education Program recommendations are as follows:  GFR calculation is accurate only with a steady state creatinine  Chronic Kidney disease less than 60 ml/min/1 73 sq  meters  Kidney failure less than 15 ml/min/1 73 sq  meters     CALCIUM 9 2 mg/dL  8 3-10 1   CHLORIDE 103 mmol/L  100-108   CARBON DIOXIDE 28 mmol/L  21-32   CREATININE 1 00 mg/dL  0 60-1 30   Standardized to IDMS reference method GLUCOSE,RANDM 100 mg/dL     POTASSIUM 4 2 mmol/L  3 5-5 3   eGFR Non-African American      >60 0 ml/min/1 73sq m   SODIUM 141 mmol/L  136-145   PHOSPHORUS 3 3 mg/dL  2 7-4 5     (1) VITAMIN D 25-HYDROXY 96Hjf7153 10:22AM Denisha Ambriz    Order Number: [id number]     Test Name Result Flag Reference   VIT D 25-HYDROX 43 5 ng/mL  30 0-100 0   This assay is a certified procedure of the CDC Vitamin D Standardization Certification Program (VDSCP)     Deficiency <20ng/ml   Insufficiency 20-30ng/ml   Sufficient  ng/ml     *Patients undergoing fluorescein dye angiography may retain small amounts of fluorescein in the body for 48-72 hours post procedure  Samples containing fluorescein can produce falsely elevated Vitamin D values  If the patient had this procedure, a specimen should be resubmitted post fluorescein clearance         Signatures   Electronically signed by : Cy Mckoy, ; Aug  3 2016  1:35PM EST                       (Author)

## 2018-01-10 NOTE — MISCELLANEOUS
Message  Pt called and said that he had a doppler 10/10 and he was told by Dr Jermaine George that he needed a repeat in 2 weeks  He tried to call central scheduling but they would not schedule without a script and had a hard time getting in touch with our office  I put the script in and I gave the call to [facility] to schedule LEV      Active Problems    1  Diabetes mellitus screening (V77 1) (Z13 1)   2  Dvt femoral (deep venous thrombosis) (453 41) (I82 419)   3  Hashimoto's thyroiditis (245 2) (E06 3)   4  Lipid screening (V77 91) (Z13 220)   5  Overweight (278 02) (E66 3)   6  Positive Lyme disease serology (795 79) (R76 8)   7  Spermatocele (608 1) (N43 40)   8  Symptomatic varicose veins of both lower extremities (454 8) (I83 893)   9  Testicular Lump Right (608 89)   10  Umbilical hernia without obstruction or gangrene (553 1) (K42 9)   11  Umbilical pain (313 49) (R10 33)   12  Umbilical swelling (858 19) (R19 09)   13  Venous insufficiency of both lower extremities (459 81) (I87 2)   14  Ventral hernia without obstruction or gangrene (553 20) (K43 9)   15  Visit for pre-operative examination (V72 84) (Z01 818)   16  Vitamin D deficiency (268 9) (E55 9)    Current Meds   1  Levothyroxine Sodium 100 MCG Oral Tablet; Take 1 tablet daily; Therapy: 47EPE2049 to (Evaluate:21Apr2018)  Requested for: 26Apr2017; Last   Rx:26Apr2017 Ordered   2  Oxycodone-Acetaminophen 5-325 MG Oral Tablet (Percocet); TAKE 1 TABLET EVERY 4   TO 6 HOURS AS NEEDED FOR PAIN;   Therapy: 00ZCJ3194 to (Evaluate:11Jun2017); Last Rx:06Jun2017 Ordered   3  Xarelto 15 MG Oral Tablet; one tablet  BID for 21 days; Therapy: 24BYG5965 to (Last Rx:03Oct2017)  Requested for: 03Oct2017 Ordered    Allergies    1  No Known Drug Allergies    2   Seasonal    Plan  Symptomatic varicose veins of both lower extremities, Venous insufficiency of both lower  extremities, Visit for pre-operative examination    · VAS LOWER LIMB VENOUS DUPLEX STUDY, UNILATERAL/LIMITED; Unilateral  Side:Right; Status:Hold For - Scheduling,Retrospective By Protocol Authorization;   Requested PHP:[id number];     Signatures   Electronically signed by : Carlee Tran, ; Oct 16 2017  2:01PM EST                       (Author)

## 2018-01-10 NOTE — PROGRESS NOTES
Assessment    1  Encounter for preventive health examination (V70 0) (Z00 00)   2  Hashimoto's thyroiditis (245 2) (E06 3)    Plan  Hashimoto's thyroiditis    · (1) TSH WITH FT4 REFLEX; Status:Active; Requested AE:[id number]; Health Maintenance    · Follow-up visit in 1 year Evaluation and Treatment  Follow-up  Status: Hold For -  Scheduling  Requested for: 32BOE6136   · Always use a seat belt and shoulder strap when riding or driving a motor vehicle ;  Status:Complete;   Done: 37XNN0216   · Begin a limited exercise program ; Status:Complete;   Done: 10IWL4439   · Brush your teeth 3 times a day and floss at least once a day ; Status:Complete;   Done:  37VRX4156   · Drink plenty of fluids ; Status:Complete;   Done: 48EXP0984   · Eat a low fat and low cholesterol diet ; Status:Complete;   Done: 87OTH5831   · Vitamins can help you get daily requirements that your diet may not be giving you ;  Status:Complete;   Done: 43QNF6382   · We recommend routine visits to a dentist ; Status:Complete;   Done: 73HTL3023   · We recommend that you bring your body mass index down to 26 ; Status:Complete;    Done: 16OEI3336   · Call [phone] if: You have any warning signs of skin cancer ; Status:Complete;    Done: 11CPN1450   · Call 911 if: You experience a new kind of chest pain (angina) or pressure ;  Status:Complete;   Done: 02WVY1270    WEIGHT LOSS AND EXERCISE ENCOURAGED   PATIENT IS NOT INTERESTED IN MEDICATIONS TO HELP WITH WEIGHT LOSS     Discussion/Summary  Impression: health maintenance visit  Currently, he eats a poor diet  Prostate cancer screening: the risks and benefits of prostate cancer screening were discussed and PSA is not indicated  Testicular cancer screening: the risks and benefits of testicular cancer screening were discussed and monthly self testicular exam was advised        Chief Complaint  pt here for physical      History of Present Illness  HM, Adult Male: The patient is being seen for a health maintenance evaluation  The last health maintenance visit was 1 year(s) ago  General Health: The patient's health since the last visit is described as good  He has regular dental visits  He denies vision problems  He denies hearing loss  Immunizations status: not up to date  Lifestyle:  He consumes a diverse and healthy diet  He has weight concerns  He does not exercise regularly  He does not use tobacco  He consumes alcohol  He denies drug use  Reproductive health:  the patient is sexually active  birth control is not being practiced  He denies erectile dysfunction  Screening: cancer screening reviewed and updated  metabolic screening reviewed and updated  risk screening reviewed and updated  HPI: GAINED 23 POUNDS SINCE LAST VISIT  NOT WATCHING WHAT HE EATS  OVER DRINKING OF ALCOHOL           Review of Systems    Constitutional: No fever or chills, feels well, no tiredness, no recent weight gain or weight loss  Eyes: No complaints of eye pain, no red eyes, no discharge from eyes, no itchy eyes  ENT: no complaints of earache, no hearing loss, no nosebleeds, no nasal discharge, no sore throat, no hoarseness  Cardiovascular: No complaints of slow heart rate, no fast heart rate, no chest pain, no palpitations, no leg claudication, no lower extremity  Respiratory: No complaints of shortness of breath, no wheezing, no cough, no SOB on exertion, no orthopnea or PND  Gastrointestinal: No complaints of abdominal pain, no constipation, no nausea or vomiting, no diarrhea or bloody stools  Genitourinary: No complaints of dysuria, no incontinence, no hesitancy, no nocturia, no genital lesion, no testicular pain  Musculoskeletal: No complaints of arthralgia, no myalgias, no joint swelling or stiffness, no limb pain or swelling  Integumentary: No complaints of skin rash or skin lesions, no itching, no skin wound, no dry skin     Neurological: No compliants of headache, no confusion, no convulsions, no numbness or tingling, no dizziness or fainting, no limb weakness, no difficulty walking  Psychiatric: Is not suicidal, no sleep disturbances, no anxiety or depression, no change in personality, no emotional problems  Endocrine: No complaints of proptosis, no hot flashes, no muscle weakness, no erectile dysfunction, no deepening of the voice, no feelings of weakness  Hematologic/Lymphatic: No complaints of swollen glands, no swollen glands in the neck, does not bleed easily, no easy bruising  Active Problems    1  Diabetes mellitus screening (V77 1) (Z13 1)   2  Hashimoto's thyroiditis (245 2) (E06 3)   3  Hypothyroidism (244 9) (E03 9)   4  Lipid screening (V77 91) (Z13 220)   5  Need for hepatitis B vaccination (V05 3) (Z23)   6  Need for influenza vaccination (V04 81) (Z23)   7  Need for vaccination for DTP (V06 1) (Z23)   8  Overweight (278 02) (E66 3)   9  Positive Lyme disease serology (795 79) (R76 8)   10  Spermatocele (608 1) (N43 40)   11  Testicular Lump Right (608 89)   12  Varicose veins without complication (409 4) (N68 20)   13  Vitamin D deficiency (268 9) (E55 9)    Past Medical History    · Need for hepatitis B vaccination (V05 3) (Z23)   · History of Need for influenza vaccination (V04 81) (Z23)   · Need for vaccination for DTP (V06 1) (Z23)   · History of Right knee pain (719 46) (M25 561)    Surgical History    · History of Denial Of Any Significant Medical History   · History of Tonsillectomy    Family History  Mother    · Family history of Malignant Melanoma Of The Skin (V16 8)  Brother    · Family history of Hashimoto thyroiditis (V18 19) (Z83 49)  Family History    · Family history of Heart Disease (V17 49)    Social History    · Alcohol Use (History)   · Denied: History of Drug Use   · Exercise Habits   · Former smoker (V15 82) (Z08 317)   · Never A Smoker    Current Meds   1  Levothyroxine Sodium 75 MCG Oral Tablet;  Take 1 tablet Tuesday thru Sunday and 0 5   tab every Monday; Therapy: 12SCT7843 to (Evaluate:22Mar2017)  Requested for: 27FMP6608; Last   Rx:77Dbm9427 Ordered   2  Vitamin D3 87875 UNIT Oral Capsule; 1 tablet weekly; Therapy: (Recorded:46Wnx7098) to Recorded    Allergies    1  No Known Drug Allergies    2  Seasonal    Vitals   Recorded: 66VRP6455 08:21AM   Heart Rate 82   Respiration 18   Systolic 575   Diastolic 90   Height 6 ft 1 23 in   Weight 283 lb    BMI Calculated 37 11   BSA Calculated 2 5     Physical Exam    Constitutional   General appearance: No acute distress, well appearing and well nourished  Head and Face   Head and face: Normal     Palpation of the face and sinuses: No sinus tenderness  Eyes   Conjunctiva and lids: No erythema, swelling or discharge  Pupils and irises: Equal, round, reactive to light  Ophthalmoscopic examination: Normal fundi and optic discs  Ears, Nose, Mouth, and Throat   External inspection of ears and nose: Normal     Otoscopic examination: Tympanic membranes translucent with normal light reflex  Canals patent without erythema  Hearing: Normal     Nasal mucosa, septum, and turbinates: Normal without edema or erythema  Lips, teeth, and gums: Normal, good dentition  Oropharynx: Normal with no erythema, edema, exudate or lesions  Neck   Neck: Supple, symmetric, trachea midline, no masses  Thyroid: Normal, no thyromegaly  Pulmonary   Respiratory effort: No increased work of breathing or signs of respiratory distress  Percussion of chest: Normal     Palpation of chest: Normal     Auscultation of lungs: Clear to auscultation  Cardiovascular   Palpation of heart: Normal PMI, no thrills  Auscultation of heart: Normal rate and rhythm, normal S1 and S2, no murmurs  Examination of extremities for edema and/or varicosities: Normal     Chest   Chest: Normal     Abdomen   Abdomen: Non-tender, no masses  Liver and spleen: No hepatomegaly or splenomegaly      Examination for hernias: No hernias appreciated  Lymphatic   Palpation of lymph nodes in neck: No lymphadenopathy  Palpation of lymph nodes in axillae: No lymphadenopathy  Musculoskeletal   Gait and station: Normal     Inspection/palpation of digits and nails: Normal without clubbing or cyanosis  Inspection/palpation of joints, bones, and muscles: Normal     Range of motion: Normal     Stability: Normal     Muscle strength/tone: Normal     Skin   Skin and subcutaneous tissue: Normal without rashes or lesions  Palpation of skin and subcutaneous tissue: Normal turgor  Neurologic   Cranial nerves: Cranial nerves 2-12 intact  Cortical function: Normal mental status  Reflexes: 2+ and symmetric  Sensation: No sensory loss  Coordination: Normal finger to nose and heel to shin  Psychiatric   Judgment and insight: Normal     Orientation to person, place and time: Normal     Recent and remote memory: Intact  Mood and affect: Normal        Results/Data  PHQ-2 Adult Depression Screening 27Jan2017 08:24AM User, Ahs     Test Name Result Flag Reference   PHQ-2 Adult Depression Score 0     Over the last two weeks, how often have you been bothered by any of the following problems?   Little interest or pleasure in doing things: Not at all - 0  Feeling down, depressed, or hopeless: Not at all - 0   PHQ-2 Adult Depression Screening Negative         Signatures   Electronically signed by : Marisela Tobin MD; Jan 27 2017  8:46AM EST                       (Author)

## 2018-01-11 NOTE — RESULT NOTES
Discussion/Summary   thyroid level is within the normal range  he may continue current dose of medication    - Dr Joan Amato      Verified Results  (1) TSH WITH FT4 REFLEX 41Wbf3282 10:44AM Aysha Pierre    Order Number: [id number]     Test Name Result Flag Reference   TSH 3 244 uIU/mL  0 358-3 740   Patients undergoing fluorescein dye angiography may retain small amounts of fluorescein in the body for 48-72 hours post procedure  Samples containing fluorescein can produce falsely depressed TSH values  If the patient had this procedure,a specimen should be resubmitted post fluorescein clearance         Signatures   Electronically signed by : Lauryn Childs MD; Sep 25 2017  9:45AM EST                       (Author)

## 2018-01-11 NOTE — RESULT NOTES
Message   no nodules in the thyroid gland but slightly enlarged due to inflammation  Verified Results  US THYROID [id number] 10:52AM Leydi Pond Order Number: [id number]     Test Name Result Flag Reference   US THYROID (Report)     THYROID ULTRASOUND     INDICATION: Recent diagnosis of Hashimoto's thyroiditis  On thyroid medication  COMPARISON: None  TECHNIQUE:  Ultrasound of the thyroid was performed with a high frequency linear transducer in transverse and sagittal planes including volumetric imaging sweeps as well as traditional still imaging technique  FINDINGS:   Thyroid parenchyma is diffusely heterogeneous in echotexture  Glandular hypervascularity and enlargement noted  Right gland: 5 7 x 2 1 x 2 1 cm  No dominant nodules  Left gland: 6 3 x 2 2 x 2 cm  No dominant nodules  Isthmus: 0 2 cm in AP dimension  No dominant nodules  IMPRESSION:      Enlarged heterogeneous thyroid gland with increased vascularity correlating to the history of thyroiditis  Workstation performed: [id number]     Signed by:    Miguel Wells MD   3/9/16

## 2018-01-11 NOTE — RESULT NOTES
Verified Results  (1) THYROGLOBULIN/QUANT W/ANTIBODY PANEL 09LUU6625 02:43PM Aysha Pierre   Performed at:  [facility]  [address] [place] [address]  [de-identified]  : Estella Gong MD, Phone:  [phone]     Test Name Result Flag Reference   THYROGLOB AB 2 9 IU/mL H 0 0 - 0 9   Thyroglobulin Antibody measured by [facility]   THYROGLOBULIN (TG-JR) 25 ng/mL     Reference Range:Pubertal Childrenand Adults: <40According to the [employer] of Clinical Biochemistry,the reference interval for Thyroglobulin (TG) should berelated to euthyroid patients and not for patients whounderwent thyroidectomy  TG reference intervals for thesepatients depend on the residual mass of the thyroid tissueleft after surgery  Establishing a post-operative baselineis recommended  The assay quantitation limit is 2 0 ng/mL         Discussion/Summary   thyroglobulin antibodies are positive   most likely Hashimoto Thyroiditis    continue thyroid medication    follow up with endocrinologist as directed   - Dr Amara Mar   Electronically signed by : Rasheed Walker MD; Feb 4 2016 10:28AM EST                       (Author)

## 2018-01-11 NOTE — RESULT NOTES
Verified Results  (1) TSH WITH FT4 REFLEX 21Jan2016 02:43PM Aysha Pierre   Patients undergoing fluorescein dye angiography may retain small amounts of fluorescein in the body for 48-72 hours post procedure  Samples containing fluorescein can produce falsely depressed TSH values  If the patient had this procedure,a specimen should be resubmitted post fluorescein clearance       Test Name Result Flag Reference   TSH 4 117 uIU/mL H 0 358-3 740   A FT4 has been ordered     (1) FREE T3 21Jan2016 02:43PM Ignacio Aysha     Test Name Result Flag Reference   FREE T3 2 60 pg/mL  2 30-4 20     (1) TSH WITH FT4 REFLEX 21Jan2016 02:43PM Aysha Pierre     Test Name Result Flag Reference   T4,FREE 0 71 ng/dL L 0 76-1 46       Discussion/Summary   thyroid test still low   will discuss starting medication next visit   - Dr Johanna Craft   Electronically signed by : Nat Salazar MD; Jan 22 2016  8:00AM EST                       (Author)

## 2018-01-12 VITALS
SYSTOLIC BLOOD PRESSURE: 128 MMHG | WEIGHT: 283 LBS | HEIGHT: 73 IN | DIASTOLIC BLOOD PRESSURE: 90 MMHG | HEART RATE: 82 BPM | BODY MASS INDEX: 37.51 KG/M2 | RESPIRATION RATE: 18 BRPM

## 2018-01-12 NOTE — PROGRESS NOTES
Preliminary Nursing Report           Patient Information   Initial Encounter Entry Date:   8/3/2017 11:01 AM EST (Automated Transmission Automated Transmission)     Last Modified:   {Ej Roberson}          Legal Name: Yahir Number:      YOB: 1975      Age (years): 37      Gender: M      Body Mass Index (BMI): 37 kg/m2      Height: 74 in  Weight: 290 lbs (132 kgs)        Address:   [address] [place] [address]           Phone: [phone]   (consent to leave messages)      Email:      Ethnicity: Decline to State      Oriental orthodox:      Marital Status:      Preferred Language: English      Race: Other Race              Patient Insurance Information      Primary Insurance InformationCarrier Name: {Primary  CarrierName}        Carrier Address:   {Primary  CarrierAddress}           Carrier Phone: {Primary  CarrierPhone}        Group Number: {Primary  GroupNumber}        Policy Number: {Primary  PolicyNumber}        Insured Name: {Primary  InsuredName}        Insured : {Primary  InsuredDOB}        Relationship to Insured: {Primary  RelationshiptoInsured}          Secondary Insurance InformationCarrier Name: {Secondary  CarrierName}        Carrier Address:   {Secondary  CarrierAddress}           Carrier Phone: {Secondary  CarrierPhone}        Group Number: {Secondary  GroupNumber}        Policy Number: {Secondary  PolicyNumber}        Insured Name: {Secondary  InsuredName}        Insured : {Secondary  InsuredDOB}        Relationship to Insured: {Secondary  RelationshiptoInsured}                Health Profile   Booking #:   Shantanu Brod #: [id number]           DOS: 2017    Surgery : Endovenous ablation therapy of incompetent vein, extremity, inclusive of all imaging guidance and monitoring, percutaneous, laser; first  vein treated    Add'l Procedures/Notes:     Surgery Risk: Minor       Precautions   BMI               Allergies   No Known Drug Allergies Seasonal     Clinical Comments: Reaction Type: , Reaction: , Severity:           Medications   Levothyroxine Sodium 100 MCG Oral Tablet     Oxycodone-Acetaminophen 5-325 MG Oral Tablet     Vitamin D3 70331 UNIT Oral Capsule            Conditions   Diabetes mellitus screening     Hashimoto's thyroiditis     Hypothyroidism     Lipid screening     Need for hepatitis B vaccination     Need for influenza vaccination     Need for vaccination for DTP     Overweight     Positive Lyme disease serology     Spermatocele     Symptomatic varicose veins of both lower extremities     Testicular Lump Right     Venous insufficiency of both lower extremities     Vitamin D deficiency            Family History   None          Surgical History   None          Social History   Alcohol Use (History)     Denies Drug Use     Exercise Habits     Former smoker     Never A Smoker                       Patient Instructions     Medical Procedure Risk  NPO Instructions   The day before surgery it is recommended to have a light dinner at your usual time and you are allowed a light snack  early in the evening  Do not eat anything heavy or eat a big meal after 7pm  Do not eat or drink anything after midnight prior to your surgery  If you are supposed to take any of your medications, do so with a sip of water  Failure to follow these instructions  can lead to an increased risk of lung complications and may result in a delay or cancellation of your procedure  If you have any questions, contact your institution for further instructions  No candy, no gum, no mints, no chewing tobacco        Oxycodone-Acetaminophen 5-325 MG Oral Tablet  Medication Instruction (Opioids - Pain Medication)  62  Please continue the following medications, if needed, up to and including the day of surgery (with a sip of water)         Levothyroxine Sodium 100 MCG Oral Tablet  Medication Instruction (Thyroxine - Synthroid)   Please continue to take this medication on your normal schedule  If this is an oral medication and you take in the morning, you may do so with a sip of water  Testing Considerations     No recommendations for this classification  Consultations     No recommendations for this classification  Miscellaneous Questions      Question: Are you able to walk up a flight of stairs, walk up a hill or do heavy housework WITHOUT having chest pain or shortness of breath? Answer: YES             Allergies/Conditions/Medications Not Found      The following were not recognized by our system when generating the recommendations  Please consider if this would impact any preoperative protocols  Alcohol Use (History)      Denies Drug Use      Exercise Habits      Lipid screening      Never A Smoker      Testicular Lump Right             Appointment Information      Date:    09/26/2017      Location:    [place]      Address:         Directions:                 Footnotes revision 15     Denotes a free-text entry  Legal Disclaimer: Any and all recommendations and services provided herein are designed to assist in the preoperative care of the patient  Nothing contained herein is designed to replace,  eliminate or alleviate the responsibility of the attending physician to supervise and determine the patients preoperative care and course of treatment  Failure to provide complete, accurate information may negatively impact the system s ability to recommend the proper preoperative protocol  THE ATTENDING PHYSICIAN IS RESPONSIBLE TO REVIEW THE SUGGESTED PREOPERATIVE PROTOCOLS/COURSE OF TREATMENT AND PRESCRIBE THE FINAL COURSE OF PREOPERATIVE TREATMENT IN CONSULTATION  WITH THE PATIENT  THE [employer]GIM[employer] SYSTEM AND ITS MATERIALS ARE PROVIDED AS IS WITHOUT WARRANTY OF ANY KIND, EXPRESS OR IMPLIED, INCLUDING, BUT NOT LIMITED TO, WARRANTIES OF PERFORMANCE OR MERCHANTABILITY OR FITNESS FOR  A PARTICULAR PURPOSE   PATIENT AND PHYSICIANS HEREBY AGREE THAT THEIR USE OF THE MATERIALS AND RESOURCES ACT AS A CONSENT TO RELEASE AND WAIVE ePREOP FROM ANY AND ALL CLAIMS OF WARRANTY, TORT OR CONTRACT LAW OF ANY KIND             Electronically signed by:Mayuri Wilson MD  Aug  3 2017  7:38PM EST

## 2018-01-13 VITALS
RESPIRATION RATE: 16 BRPM | WEIGHT: 289.25 LBS | DIASTOLIC BLOOD PRESSURE: 82 MMHG | BODY MASS INDEX: 38.16 KG/M2 | SYSTOLIC BLOOD PRESSURE: 132 MMHG | HEART RATE: 68 BPM

## 2018-01-13 VITALS
SYSTOLIC BLOOD PRESSURE: 130 MMHG | HEART RATE: 76 BPM | HEIGHT: 74 IN | BODY MASS INDEX: 37.22 KG/M2 | WEIGHT: 290 LBS | RESPIRATION RATE: 16 BRPM | DIASTOLIC BLOOD PRESSURE: 82 MMHG

## 2018-01-13 NOTE — RESULT NOTES
Verified Results  (1) LIPID PANEL, FASTING 23Feb2016 08:00AM Ignacio Nell Connor Order Number: [id number]      Triglyceride:         Normal              <150 mg/dl       Borderline High    150-199 mg/dl       High               200-499 mg/dl       Very High          >499 mg/dl  Cholesterol:         Desirable        <200 mg/dl      Borderline High  200-239 mg/dl      High             >239 mg/dl  HDL Cholesterol:        High    >59 mg/dL      Low     <41 mg/dL  LDL CALCULATED:    This screening LDL is a calculated result  It does not have the accuracy of the Direct Measured LDL in the monitoring of patients with hyperlipidemia and/or statin therapy  Direct Measure LDL (TUQ037) must be ordered separately in these patients  Test Name Result Flag Reference   CHOLESTEROL 179 mg/dL     HDL,DIRECT 42 mg/dL  40-60   LDL CHOLESTEROL CALCULATED 88 mg/dL  0-100   TRIGLYCERIDES 246 mg/dL H <=150     (1) TSH WITH FT4 REFLEX 23Feb2016 08:00AM [facility] [place] Order Number: [id number]    Patients undergoing fluorescein dye angiography may retain small amounts of fluorescein in the body for 48-72 hours post procedure  Samples containing fluorescein can produce falsely depressed TSH values  If the patient had this procedure,a specimen should be resubmitted post fluorescein clearance  Test Name Result Flag Reference   TSH 7 019 uIU/mL H 0 358-3 740   A FT4 has been ordered     (1) FREE T3 23Feb2016 08:00AM Ignacio Dengwilfredo Connor Order Number: [id number]     Test Name Result Flag Reference   FREE T3 2 97 pg/mL  2 30-4 20       Plan  Hashimoto's thyroiditis, Hypothyroidism    · Levothyroxine Sodium 50 MCG Oral Tablet; TAKE 1 TABLET BY MOUTH ONCE  DAILY  Hypothyroidism    · Levothyroxine Sodium 25 MCG Oral Tablet    Discussion/Summary      thyroid level is even lower now  need to increase his thyroid med to 50mcg  will send it to the pharmacy      high triglycerides level   watch sweets and fatty food   weight loss encouraged   - Dr Wilbert Fan   Electronically signed by : Josh Mcelroy MD; Feb 24 2016 11:41AM EST                       (Author)

## 2018-01-13 NOTE — PROGRESS NOTES
Assessment    1  Varicose veins with pain, right (454 8) (I12 479)    Plan    1  Gradient compression stocking, below knee, 20-30mm Hg, each; Status:Complete;     Done: 38ZKF5672   2  Gradient compression stocking, thigh length, 20-30 mm Hg, each; Status:Complete;     Done: 16ILK7056   3  VAS REFLUX LOWER LIMB   ; Status:Active; Requested for:35Gyl2933;    4  Begin or continue regular aerobic exercise  Gradually work up to at least count1   sessions of dur1 of exercise a week ; Status:Complete;   Done: 15VNV1606   5  Keep your leg elevated whenever you can to decrease swelling and increase circulation ;   Status:Complete;   Done: 69PLN0594   6  Support stockings can help keep the blood from pooling in the small veins in your feet   and legs ; Status:Complete;   Done: 15ETE7968   7  Follow-up visit in 3 months Evaluation and Treatment  Follow-up  Status: Complete    Done: 17XJZ0209    Discussion/Summary  Discussion Summary:   58-year-old male with Hashimoto's who comes in for evaluation of bilateral lower extremity varicosities with right lower extremity pain, itching symptoms    -Recommended trial of compression  Rx for thigh high and below the knee compression given  Stockings to be worn daily and removed at night    -Also recommended periodic leg elevation,moisturizers daily, exercise and weight loss  -Will do venous reflux study in 3 months and he will follow up with a physician to discuss treatment options  Counseling Documentation With Imm: The patient was counseled regarding instructions for management, impressions  Chief Complaint  Chief Complaint Free Text Note Form: " I am here to have my veins in my legs looked at " No Testing   PT states about three years ago he started with bilateral varicose veins  PT states he is only having pain in his RLE  PT states he is having pain, itching & tightness  PT denies any swelling or open sores or wounds at this time   PT does not wear compression stocking , PT denies any history of blood clots  PT is no on any blood thinners  PT is a nonsmoker  History of Present Illness  Varicose Veins [place] [facility]: The patient is being seen for an initial evaluation of varicose veins  Symptoms:  bilateral bulging veins, itching on the right side and right leg pain, but no leg swelling, no muscle cramps, no spider veins, no stasis dermatitis, no cellulitis, no hyperpigmentation, no venous ulcers, no dry skin and no bleeding  The patient describes the pain as aching, itching, burning, dull and pulling sensation in the right thigh  These symptoms developed Onset 3 years ago and progressively worsening pain over the year(s) ago  Evaluation and Treatment History: He was previously evaluated by a primary physician  This patient has had no prior surgical treatment of the venous system  The patient has never used compression hose  This patient is exercising   This patient is attempting weight management   Free Text HPI: 78-year-old male with Hashimoto's who comes in for evaluation of bilateral lower extremity varicosities with right lower extremity pain, itching symptoms  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  Right thigh aching type pain associated with varicosities  No pain or discomfort in the contralateral leg  He denies edema  No history of deep or superficial venous thrombosis  Paternal history of varicosities  Review of Systems  Complete Male - Vasc:   Constitutional: no loss in appetite and recent 30 lb weight gain, but no fever, no chills, not feeling tired and no recent weight loss  Eyes: No sudden vision loss, no blurred vision, no double vision  ENT: no loss of hearing, no nosebleeds, no hoarseness  Cardiovascular: no bleeding veins, but regular heart rate, no chest pain, no intermittent leg claudication, painful veins, no palpitations and leg pain with walking  Respiratory: No sob, no wheezing, no cough, no sob with exertion, no orthopnea  Gastrointestinal: No nausea, No vomiting, no diarrhea, no blood in stool  Genitourinary: no dysuria, no hematuria, No urinary incontinence, no erectile dysfunction  Musculoskeletal: no limb pain, no limb swelling  Integumentary: itching and no ulcers, but no rashes, no dry skin, no skin lesions and no skin wound  Neurological: no dementia, no headache, no numbness, no limb weakness, no dizziness, no difficulty walking  Psychiatric: no depression, no mood disorders, no anxiety  Hematologic/Lymphatic: no bleeding disorder, no easy bruising  ROS Reviewed:   ROS reviewed  Active Problems    1  Diabetes mellitus screening (V77 1) (Z13 1)   2  Hashimoto's thyroiditis (245 2) (E06 3)   3  Hypothyroidism (244 9) (E03 9)   4  Lipid screening (V77 91) (Z13 220)   5  Need for hepatitis B vaccination (V05 3) (Z23)   6  Need for influenza vaccination (V04 81) (Z23)   7  Need for vaccination for DTP (V06 1) (Z23)   8  Overweight (278 02) (E66 3)   9  Positive Lyme disease serology (795 79) (R76 8)   10  Spermatocele (608 1) (N43 40)   11  Testicular Lump Right (608 89)   12  Varicose veins with pain, right (454 8) (I83 811)   13  Varicose veins without complication (710 4) (F50 92)   14  Vitamin D deficiency (268 9) (E55 9)    Past Medical History    1  Need for hepatitis B vaccination (V05 3) (Z23)   2  History of Need for influenza vaccination (V04 81) (Z23)   3  Need for vaccination for DTP (V06 1) (Z23)   4  History of Right knee pain (719 46) (M25 561)  Active Problems And Past Medical History Reviewed: The active problems and past medical history were reviewed and updated today  Surgical History  Surgical History Reviewed: The surgical history was reviewed and updated today  Family History  Mother    1  Family history of Malignant Melanoma Of The Skin (V16 8)  Brother    2  Family history of Hashimoto thyroiditis (V18 19) (Z83 49)  Family History    3   Family history of Heart Disease (V17 49)  Family History Reviewed: The family history was reviewed and updated today  Social History    · Alcohol Use (History)   · Denied: History of Drug Use   · Exercise Habits   · Former smoker (N90 82) (G22 377)   · Never A Smoker  Social History Reviewed: The social history was reviewed and updated today  Current Meds   1  Levothyroxine Sodium 75 MCG Oral Tablet; TAKE ONE TABLET BY MOUTH DAILY IN THE   MORNING ON ANEMPTY STOMACH; Therapy: 55UCM5823 to (Last Rx:27Jan2017)  Requested for: 39OAZ2875 Ordered   2  Vitamin D3 01636 UNIT Oral Capsule; 1 tablet weekly; Therapy: (Recorded:65Rzl8900) to Recorded  Medication List Reviewed: The medication list was reviewed and updated today  Allergies    1  No Known Drug Allergies    2  Seasonal    Vitals  Vital Signs    Recorded: 13Ach0177 09:23AM   Heart Rate 78, R Radial   Pulse Quality Normal, R Radial   Respiration Quality Normal   Respiration 16   Systolic 529, RUE, Sitting   Diastolic 76, RUE, Sitting   Height 6 ft 2 in   Weight 290 lb    BMI Calculated 37 23   BSA Calculated 2 54     Physical Exam    Femoral: right 2+ and left 2+  Posterior tibialis: right 2+ and left 2+  Dorsalis pedis: right 2+ and left 2+  Distal Pulse Exam: Normal Capillary Refill  Extremities: bilateral ankle trace+ pitting edema  LE Varicose Veins: right leg medial mid thigh branching anteriolaterally to the calf + truncal veins and left leg truncal veins  The heart rate was normal  The rhythm was regular  Heart sounds: normal S1 and normal S2    Murmurs: No murmurs were heard  Pulmonary   Respiratory effort: No increased work of breathing or signs of respiratory distress  Auscultation of lungs: Clear to auscultation  No wheezing, no rales, no rhonchi  Abdomen   Abdomen: Abdomen soft, non-tender, no masses, non distended, no rebound tenderness     Psychiatric   Orientation to person, place and time: Normal    Mood and affect: Normal    Neurologic Sensory exam normal   Motor skills intact  Musculoskeletal   Gait and station: Normal    Digits and nails: Normal without clubbing or cyanosis  Range of motion: Normal    Skin   Skin and subcutaneous tissue: Normal without rashes or lesions  Palpation of skin and subcutaneous tissue: Normal turgor  Venous Disease: No lipodermatosclerosis, stasis dermatitis, hyperpigmentation, or atrophie jose noted on exam       Future Appointments    Date/Time Provider Specialty Site   05/10/2017 10:45 AM Nikolas Wilson MD Vascular Surgery [facility]   06/05/2017 08:45 AM Marquise Pierre MD [facility]   Electronically signed by : Adriana Mon; Feb 23 2017 10:37AM EST                       (Author)    Electronically signed by :  Will Grady MD; Feb 24 2017 10:50AM EST                       (Author)

## 2018-01-14 VITALS
SYSTOLIC BLOOD PRESSURE: 130 MMHG | WEIGHT: 290 LBS | DIASTOLIC BLOOD PRESSURE: 76 MMHG | BODY MASS INDEX: 37.22 KG/M2 | HEART RATE: 78 BPM | HEIGHT: 74 IN | RESPIRATION RATE: 16 BRPM

## 2018-01-14 VITALS
HEIGHT: 74 IN | DIASTOLIC BLOOD PRESSURE: 66 MMHG | TEMPERATURE: 97 F | SYSTOLIC BLOOD PRESSURE: 116 MMHG | WEIGHT: 289.38 LBS | RESPIRATION RATE: 16 BRPM | HEART RATE: 66 BPM | BODY MASS INDEX: 37.14 KG/M2

## 2018-01-14 NOTE — RESULT NOTES
Verified Results  (1) HEMOGLOBIN A1C 01Aug2016 10:22AM Xin Pierre Order Number: [id number]     Test Name Result Flag Reference   HEMOGLOBIN A1C 5 6 %  4 2-6 3   EST  AVG  GLUCOSE 114 mg/dl       (1) LIPID PANEL, FASTING 01Aug2016 10:22AM Xin Pierre [place] Order Number: [id number]     Test Name Result Flag Reference   CHOLESTEROL 215 mg/dL H    HDL,DIRECT 62 mg/dL H 40-60   Specimen collection should occur prior to Metamizole administration due to the potential for falsely depressed results  LDL CHOLESTEROL CALCULATED 135 mg/dL H 0-100   Triglyceride:         Normal              <150 mg/dl       Borderline High    150-199 mg/dl       High               200-499 mg/dl       Very High          >499 mg/dl  Cholesterol:         Desirable        <200 mg/dl      Borderline High  200-239 mg/dl      High             >239 mg/dl  HDL Cholesterol:        High    >59 mg/dL      Low     <41 mg/dL  LDL CALCULATED:    This screening LDL is a calculated result  It does not have the accuracy of the Direct Measured LDL in the monitoring of patients with hyperlipidemia and/or statin therapy  Direct Measure LDL (OGA272) must be ordered separately in these patients  TRIGLYCERIDES 89 mg/dL  <=150   Specimen collection should occur prior to N-Acetylcysteine or Metamizole administration due to the potential for falsely depressed results         Discussion/Summary   Bad cholesterol LDL level slightly high    watch sweets and fatty food   diabetes test negative for diabetes    overall looks good!   - Dr Jayden Jules   Electronically signed by : Ronny Mccauley MD; Aug  1 2016  2:43PM EST                       (Author)

## 2018-01-15 NOTE — PROGRESS NOTES
Preliminary Nursing Report           Patient Information   Initial Encounter Entry Date:   2017 7:50 AM EST (Automated Transmission Automated Transmission)     Last Modified:   {Ej Roberson}          Legal Name: Yahir Number:      YOB: 1975      Age (years): 37      Gender: M      Body Mass Index (BMI): 37 kg/m2      Height: 74 in  Weight: 290 lbs (132 kgs)        Address:   [address] [place] [address]           Phone: [phone]   (consent to leave messages)      Email:      Ethnicity: Decline to State      Denominational:      Marital Status:      Preferred Language: English      Race: Other Race              Patient Insurance Information      Primary Insurance InformationCarrier Name: {Primary  CarrierName}        Carrier Address:   {Primary  CarrierAddress}           Carrier Phone: {Primary  CarrierPhone}        Group Number: {Primary  GroupNumber}        Policy Number: {Primary  PolicyNumber}        Insured Name: {Primary  InsuredName}        Insured : {Primary  InsuredDOB}        Relationship to Insured: {Primary  RelationshiptoInsured}          Secondary Insurance InformationCarrier Name: {Secondary  CarrierName}        Carrier Address:   {Secondary  CarrierAddress}           Carrier Phone: {Secondary  CarrierPhone}        Group Number: {Secondary  GroupNumber}        Policy Number: {Secondary  PolicyNumber}        Insured Name: {Secondary  InsuredName}        Insured : {Secondary  InsuredDOB}        Relationship to Insured: {Secondary  RelationshiptoInsured}                Health Profile   Booking #:   Ben Ayala #: [id number]           DOS: 2017    Surgery : Endovenous ablation therapy of incompetent vein, extremity, inclusive of all imaging guidance and monitoring, percutaneous, laser; first  vein treated    Add'l Procedures/Notes:     Surgery Risk: Minor       Precautions   BMI               Allergies   No Known Drug Allergies Seasonal     Clinical Comments: Reaction Type: , Reaction: , Severity:           Medications   Levothyroxine Sodium 100 MCG Oral Tablet     Oxycodone-Acetaminophen 5-325 MG Oral Tablet     Vitamin D3 03903 UNIT Oral Capsule            Conditions   Diabetes mellitus screening     Hashimoto's thyroiditis     Hypothyroidism     Lipid screening     Need for hepatitis B vaccination     Need for influenza vaccination     Need for vaccination for DTP     Overweight     Positive Lyme disease serology     Spermatocele     Symptomatic varicose veins of both lower extremities     Testicular Lump Right     Venous insufficiency of both lower extremities     Vitamin D deficiency            Family History   None          Surgical History   None          Social History   Alcohol Use (History)     Denies Drug Use     Exercise Habits     Former smoker     Never A Smoker                       Patient Instructions     Medical Procedure Risk  NPO Instructions   The day before surgery it is recommended to have a light dinner at your usual time and you are allowed a light snack  early in the evening  Do not eat anything heavy or eat a big meal after 7pm  Do not eat or drink anything after midnight prior to your surgery  If you are supposed to take any of your medications, do so with a sip of water  Failure to follow these instructions  can lead to an increased risk of lung complications and may result in a delay or cancellation of your procedure  If you have any questions, contact your institution for further instructions  No candy, no gum, no mints, no chewing tobacco        Oxycodone-Acetaminophen 5-325 MG Oral Tablet  Medication Instruction (Opioids - Pain Medication)  62  Please continue the following medications, if needed, up to and including the day of surgery (with a sip of water)         Levothyroxine Sodium 100 MCG Oral Tablet  Medication Instruction (Thyroxine - Synthroid)   Please continue to take this medication on your normal schedule  If this is an oral medication and you take in the morning, you may do so with a sip of water  Testing Considerations     No recommendations for this classification  Consultations     No recommendations for this classification  Miscellaneous Questions      Question: Are you able to walk up a flight of stairs, walk up a hill or do heavy housework WITHOUT having chest pain or shortness of breath? Answer: YES             Allergies/Conditions/Medications Not Found      The following were not recognized by our system when generating the recommendations  Please consider if this would impact any preoperative protocols  Alcohol Use (History)      Denies Drug Use      Exercise Habits      Lipid screening      Never A Smoker      Testicular Lump Right             Appointment Information      Date:    07/21/2017      Location:    [place]      Address:         Directions:                 Footnotes revision 15     Denotes a free-text entry  Legal Disclaimer: Any and all recommendations and services provided herein are designed to assist in the preoperative care of the patient  Nothing contained herein is designed to replace,  eliminate or alleviate the responsibility of the attending physician to supervise and determine the patients preoperative care and course of treatment  Failure to provide complete, accurate information may negatively impact the system s ability to recommend the proper preoperative protocol  THE ATTENDING PHYSICIAN IS RESPONSIBLE TO REVIEW THE SUGGESTED PREOPERATIVE PROTOCOLS/COURSE OF TREATMENT AND PRESCRIBE THE FINAL COURSE OF PREOPERATIVE TREATMENT IN CONSULTATION  WITH THE PATIENT  THE [employer] SYSTEM AND ITS MATERIALS ARE PROVIDED AS IS WITHOUT WARRANTY OF ANY KIND, EXPRESS OR IMPLIED, INCLUDING, BUT NOT LIMITED TO, WARRANTIES OF PERFORMANCE OR MERCHANTABILITY OR FITNESS FOR  A PARTICULAR PURPOSE   PATIENT AND PHYSICIANS HEREBY AGREE THAT THEIR USE OF THE MATERIALS AND RESOURCES ACT AS A CONSENT TO RELEASE AND WAIVE ePREOP FROM ANY AND ALL CLAIMS OF WARRANTY, TORT OR CONTRACT LAW OF ANY KIND             Electronically signed by:Mayuri Wilson MD  Jul 18 2017  5:32PM EST

## 2018-01-15 NOTE — MISCELLANEOUS
Message  pt called re: repeat lev doppler results from today, he said they are unchanged  was done at [address] lab  s/w [facility], she said it is unchanged, gsv knee to 5mm from saph fem junction (prev had been 4mm)  pt is completing xarelto today  discussed w/ B Thomas CISNEROS  pt's doppler is stable, he does not require further anticoag  keep f/u ov  pt informed of same  he will call if any further ?/concerns and keep f/u ov shced for 11/5  Active Problems    1  Diabetes mellitus screening (V77 1) (Z13 1)   2  Dvt femoral (deep venous thrombosis) (453 41) (I82 419)   3  Hashimoto's thyroiditis (245 2) (E06 3)   4  Lipid screening (V77 91) (Z13 220)   5  Overweight (278 02) (E66 3)   6  Positive Lyme disease serology (795 79) (R76 8)   7  Spermatocele (608 1) (N43 40)   8  Symptomatic varicose veins of both lower extremities (454 8) (I83 893)   9  Testicular Lump Right (608 89)   10  Umbilical hernia without obstruction or gangrene (553 1) (K42 9)   11  Umbilical pain (279 32) (R10 33)   12  Umbilical swelling (337 84) (R19 09)   13  Venous insufficiency of both lower extremities (459 81) (I87 2)   14  Ventral hernia without obstruction or gangrene (553 20) (K43 9)   15  Visit for pre-operative examination (V72 84) (Z01 818)   16  Vitamin D deficiency (268 9) (E55 9)    Current Meds   1  Levothyroxine Sodium 100 MCG Oral Tablet; Take 1 tablet daily; Therapy: 88ZQK4782 to (Evaluate:21Apr2018)  Requested for: 26Apr2017; Last   Rx:26Apr2017 Ordered   2  Oxycodone-Acetaminophen 5-325 MG Oral Tablet (Percocet); TAKE 1 TABLET EVERY 4   TO 6 HOURS AS NEEDED FOR PAIN;   Therapy: 98BPV0771 to (Evaluate:11Jun2017); Last Rx:06Jun2017 Ordered   3  Xarelto 15 MG Oral Tablet; one tablet  BID for 21 days; Therapy: 29MMH0216 to (Last Rx:03Oct2017)  Requested for: 03Oct2017 Ordered    Allergies    1  No Known Drug Allergies    2   Seasonal    Signatures   Electronically signed by : Miguel Staley, ; Oct 24 2017 12:33PM EST                       (Author)

## 2018-01-16 NOTE — RESULT NOTES
Message   Continue the same thyroid medication dose  Start vitamin D 50,000 units twice a week for 1 month and then once a week for another month  Please repeat TSH, free T4, Before next visit     Verified Results  (1) RENAL FUNCTION PANEL 07Apr2016 10:34AM Prakashmateus [facility] Order Number: [id number]      National Kidney Disease Education Program recommendations are as follows:  GFR calculation is accurate only with a steady state creatinine  Chronic Kidney disease less than 60 ml/min/1 73 sq  meters  Kidney failure less than 15 ml/min/1 73 sq  meters  Test Name Result Flag Reference   ANION GAP (CALC) 7 mmol/L  4-13   ALBUMIN 4 0 g/dL  3 5-5 0   BLOOD UREA NITROGEN 13 mg/dL  5-25   CALCIUM 9 1 mg/dL  8 3-10 1   CHLORIDE 103 mmol/L  100-108   CARBON DIOXIDE 30 mmol/L  21-32   CREATININE 1 09 mg/dL  0 60-1 30   Standardized to IDMS reference method   GLUCOSE,RANDM 92 mg/dL     POTASSIUM 4 4 mmol/L  3 5-5 3   eGFR Non-African American      >60 0 ml/min/1 73sq m   SODIUM 140 mmol/L  136-145   PHOSPHORUS 3 0 mg/dL  2 7-4 5     (1) TSH 07Apr2016 10:34AM [facility]   TW Order Number: [id number]    Patients undergoing fluorescein dye angiography may retain small amounts of fluorescein in the body for 48-72 hours post procedure  Samples containing fluorescein can produce falsely depressed TSH values  If the patient had this procedure,a specimen should be resubmitted post fluorescein clearance       Test Name Result Flag Reference   TSH 2 178 uIU/mL  0 358-3 740     (1) T4, FREE 07Apr2016 10:34AM [facility]ash[facility]honnysandhya [facility] Order Number: [id number]     Test Name Result Flag Reference   T4,FREE 1 01 ng/dL  0 76-1 46     (1) PTH N-TERMINAL (INTACT) 07Apr2016 10:34AM [facility]   TW Order Number: [id number]     Order Number: [id number]     Test Name Result Flag Reference   PARATHYROID HORMONE INTACT 54 0 pg/mL  14 0-72 0     (1) VITAMIN D 25-HYDROXY 07Apr2016 10:34AM [employer]   TW Order Number: [id number]     Order Number: [id number]     Test Name Result Flag Reference   VIT D 25-HYDROX 14 8 ng/mL L 30 0-100 0

## 2018-01-16 NOTE — RESULT NOTES
Verified Results  (1) TSH WITH FT4 REFLEX 81Fzp2533 10:35AM Ignacio Song [facility] Order Number: [id number]     Test Name Result Flag Reference   TSH 5 719 uIU/mL H 0 358-3 740   A FT4 has been ordered      Patients undergoing fluorescein dye angiography may retain small amounts of fluorescein in the body for 48-72 hours post procedure  Samples containing fluorescein can produce falsely depressed TSH values  If the patient had this procedure,a specimen should be resubmitted post fluorescein clearance  (1) TSH WITH FT4 REFLEX 37Zvp0371 10:35AM Ignacio [facility] Order Number: [id number]     Test Name Result Flag Reference   TSH 5 719 uIU/mL H 0 358-3 740   A FT4 has been ordered      Patients undergoing fluorescein dye angiography may retain small amounts of fluorescein in the body for 48-72 hours post procedure  Samples containing fluorescein can produce falsely depressed TSH values  If the patient had this procedure,a specimen should be resubmitted post fluorescein clearance  T4,FREE 0 84 ng/dL  0 76-1 46       Discussion/Summary   thyroid level came back low  will go up on levothyroxine to 100mcg daily   will repeat blood work in 6 weeks    - Dr Nikko Bardales

## 2018-01-16 NOTE — RESULT NOTES
Message   TSH and free T4 normal, continue current dose of Levothyroxine  Verified Results  (1) TSH 45Zuw8411 11:05AM [facility]    Order Number: [id number]  TW Order Number: [id number]     Test Name Result Flag Reference   TSH 3 699 uIU/mL  0 358-3 740   - Patient Instructions: This bloodwork is non-fasting  Please drink two glasses of water morning of bloodwork  - Patient Instructions: This bloodwork is non-fasting  Please drink two glasses of water morning of bloodwork  - Patient Instructions: This bloodwork is non-fasting  Please drink two glasses of water morning of bloodwork  - Patient Instructions: This bloodwork is non-fasting  Please drink two glasses of water morning of bloodwork  Patients undergoing fluorescein dye angiography may retain small amounts of fluorescein in the body for 48-72 hours post procedure  Samples containing fluorescein can produce falsely depressed TSH values  If the patient had this procedure,a specimen should be resubmitted post fluorescein clearance       (1) T4, FREE 75Gvz7315 11:05AM [facility]    Order Number: [id number]  TW Order Number: [id number]     Test Name Result Flag Reference   T4,FREE 1 04 ng/dL  0 76-1 46

## 2018-01-16 NOTE — RESULT NOTES
Verified Results  (1) TSH WITH FT4 REFLEX 27Jan2017 08:52AM Adrianna Pierre Order Number: [id number]     Test Name Result Flag Reference   TSH 6 760 uIU/mL H 0 358-3 740   Patients undergoing fluorescein dye angiography may retain small amounts of fluorescein in the body for 48-72 hours post procedure  Samples containing fluorescein can produce falsely depressed TSH values  If the patient had this procedure,a specimen should be resubmitted post fluorescein clearance  Plan  Hashimoto's thyroiditis, Hypothyroidism    · From  Levothyroxine Sodium 75 MCG Oral Tablet Take 1 tablet Tuesday thru  Sunday and 0 5 tab every Monday To Levothyroxine Sodium 75 MCG Oral Tablet TAKE  ONE TABLET BY MOUTH DAILY IN THE MORNING ON ANEMPTY STOMACH  Hypothyroidism    · (1) TSH WITH FT4 REFLEX; Status:Active; Requested for:[id number];     Discussion/Summary   thyroid level came back slightly low  i want him to take 75mcg daily  Not 1/2 tab on sundays anymore  and repeat blood work in 4 weeks   may mail the script to the house   - Dr Miguel Crockett   Electronically signed by : Kelvin Ruano MD; Jan 27 2017  1:06PM EST                       (Author)

## 2018-01-17 ENCOUNTER — GENERIC CONVERSION - ENCOUNTER (OUTPATIENT)
Dept: OTHER | Facility: OTHER | Age: 43
End: 2018-01-17

## 2018-01-17 NOTE — MISCELLANEOUS
Message  s/p L GSV EVLT with stab phlebectomies    Patient called and discussed finding of EHIT class II, on f/u ultrasound    Rx sent to patient's pharmacy with instructions; discussed increased risk of bleeding on anticoagulation  -will repeat DU in 1 week; office to call for appt      Plan  Dvt femoral (deep venous thrombosis)    · Xarelto 15 MG Oral Tablet; one tablet  BID for 21 days  Dvt femoral (deep venous thrombosis), Symptomatic varicose veins of both lower  extremities    · VAS LOWER LIMB VENOUS DUPLEX STUDY, UNILATERAL/LIMITED;  Unilateral  Side:Left; Status:Hold For - Scheduling; Requested Marilee Ramos;     Signatures   Electronically signed by : Susie Pulse Doctor, MD; Oct  3 2017 12:56PM EST                       (Author)

## 2018-01-17 NOTE — RESULT NOTES
Discussion/Summary     thyroid level is within the normal range, continue current dose of Levothyroxine,    - Dr Nikko Bardales     Verified Results  (1) TSH WITH FT4 REFLEX 18Apr2017 08:53AM Aysha Pierre    Order Number: [id number]     Test Name Result Flag Reference   TSH 2 630 uIU/mL  0 358-3 740   Patients undergoing fluorescein dye angiography may retain small amounts of fluorescein in the body for 48-72 hours post procedure  Samples containing fluorescein can produce falsely depressed TSH values  If the patient had this procedure,a specimen should be resubmitted post fluorescein clearance         Signatures   Electronically signed by : Rene Farrell MD; Apr 18 2017  1:18PM EST                       (Author)

## 2018-01-23 VITALS
WEIGHT: 292.38 LBS | HEIGHT: 74 IN | BODY MASS INDEX: 37.52 KG/M2 | DIASTOLIC BLOOD PRESSURE: 80 MMHG | SYSTOLIC BLOOD PRESSURE: 112 MMHG

## 2018-01-23 NOTE — MISCELLANEOUS
Message  Return to work or school:   Bebe Mirza is under my professional care  He was seen in my office on December 13, 2017   He is able to return to work on  December 14, 2017    He can perform work without limitations           Future Appointments    Signatures   Electronically signed by : Ankur Powell MD; Dec 13 2017 11:08AM EST                       (Author)    Electronically signed by : Ankur Powell MD; Dec 13 2017 11:11AM EST                       (Author)

## 2018-01-23 NOTE — MISCELLANEOUS
Message  Return to work or school:   Khoi Duran is under my professional care  He was seen in my office on 12/1/17  He is not able to return to work until 12/14/17             Signatures   Electronically signed by : Rabia Wheeler MD; Dec 15 2017 12:04PM EST                       (Author)

## 2018-01-24 VITALS
TEMPERATURE: 97.1 F | DIASTOLIC BLOOD PRESSURE: 88 MMHG | WEIGHT: 295 LBS | SYSTOLIC BLOOD PRESSURE: 120 MMHG | HEIGHT: 73 IN | BODY MASS INDEX: 39.1 KG/M2

## 2018-02-07 ENCOUNTER — OFFICE VISIT (OUTPATIENT)
Dept: FAMILY MEDICINE CLINIC | Facility: CLINIC | Age: 43
End: 2018-02-07
Payer: COMMERCIAL

## 2018-02-07 VITALS
RESPIRATION RATE: 16 BRPM | HEART RATE: 84 BPM | HEIGHT: 73 IN | SYSTOLIC BLOOD PRESSURE: 138 MMHG | BODY MASS INDEX: 38.36 KG/M2 | DIASTOLIC BLOOD PRESSURE: 88 MMHG | WEIGHT: 289.4 LBS

## 2018-02-07 DIAGNOSIS — Z00.00 WELL ADULT EXAM: ICD-10-CM

## 2018-02-07 DIAGNOSIS — G47.19 EXCESSIVE DAYTIME SLEEPINESS: Primary | ICD-10-CM

## 2018-02-07 DIAGNOSIS — E06.3 HASHIMOTO'S THYROIDITIS: Primary | ICD-10-CM

## 2018-02-07 DIAGNOSIS — G47.19 EXCESSIVE DAYTIME SLEEPINESS: ICD-10-CM

## 2018-02-07 PROBLEM — I82.419 DVT FEMORAL (DEEP VENOUS THROMBOSIS) (HCC): Status: RESOLVED | Noted: 2017-10-03 | Resolved: 2018-02-07

## 2018-02-07 PROBLEM — I87.2 VENOUS INSUFFICIENCY OF BOTH LOWER EXTREMITIES: Status: ACTIVE | Noted: 2017-01-27

## 2018-02-07 PROBLEM — K43.9 VENTRAL HERNIA WITHOUT OBSTRUCTION OR GANGRENE: Status: ACTIVE | Noted: 2017-09-05

## 2018-02-07 PROBLEM — K43.9 VENTRAL HERNIA WITHOUT OBSTRUCTION OR GANGRENE: Status: RESOLVED | Noted: 2017-09-05 | Resolved: 2018-02-07

## 2018-02-07 PROBLEM — I82.419 DVT FEMORAL (DEEP VENOUS THROMBOSIS) (HCC): Status: ACTIVE | Noted: 2017-10-03

## 2018-02-07 PROBLEM — I83.893 SYMPTOMATIC VARICOSE VEINS OF BOTH LOWER EXTREMITIES: Status: ACTIVE | Noted: 2017-06-06

## 2018-02-07 PROCEDURE — 99396 PREV VISIT EST AGE 40-64: CPT | Performed by: FAMILY MEDICINE

## 2018-02-07 RX ORDER — OXYCODONE HYDROCHLORIDE AND ACETAMINOPHEN 5; 325 MG/1; MG/1
1 TABLET ORAL
COMMUNITY
Start: 2017-06-06 | End: 2018-02-15 | Stop reason: SDUPTHER

## 2018-02-07 RX ORDER — LEVOTHYROXINE SODIUM 0.1 MG/1
1 TABLET ORAL DAILY
COMMUNITY
Start: 2017-02-28 | End: 2018-02-07

## 2018-02-07 NOTE — PROGRESS NOTES
German Gabriel was seen today for physical exam     Diagnoses and all orders for this visit:    Hashimoto's thyroiditis  -     TSH, 3rd generation with T4 reflex  -     Lipid Panel with Direct LDL reflex  -     Comprehensive metabolic panel    Well adult exam    Excessive daytime sleepiness  -     Diagnostic Sleep Study; Future  -     Ambulatory referral to Pulmonology    borderline BP today  F/u in 4 months to recheck   Prescription for compression stockings given     Chief Complaint   Patient presents with    Physical Exam     Pt here for Physical       HPI  Intermittent fasting now x 2 days a week  Lost weight on it   Wife c/o him snoring every night and wake up gasping for air   +daytime sleepiness     History   Sexual Activity    Sexual activity: Not on file       Dental visit: Jan 2018     Vision test: last eye check 12/2017      Review of Systems   Constitutional: Negative  HENT: Negative  Eyes: Negative  Respiratory: Negative  Cardiovascular: Negative  Gastrointestinal: Negative  Endocrine: Negative  Genitourinary: Negative  Musculoskeletal: Negative  Skin: Negative  Allergic/Immunologic: Negative  Neurological: Negative  Hematological: Negative  Psychiatric/Behavioral: Negative            Family History   Problem Relation Age of Onset    Melanoma Mother     Hashimoto's thyroiditis Brother     Heart disease Family        Past Medical History:   Diagnosis Date    Hashimoto's thyroiditis     Hashimoto's thyroiditis     Positive Lyme disease serology     Right knee pain     last assessed - [id number]    Spermatocele     Umbilical hernia without obstruction or gangrene     last assessed - [id number]    Umbilical pain     resolved - [id number]    Umbilical swelling     resolved - [id number]    Varicose veins of both lower extremities     Ventral hernia without obstruction or gangrene     last assessed - [id number]         Social History     Social History    Marital status: /Civil Union     Spouse name: N/A    Number of children: N/A    Years of education: N/A     Occupational History    Not on file  Social History Main Topics    Smoking status: Former Smoker    Smokeless tobacco: Never Used      Comment: stopped 8 years    Alcohol use 1 2 oz/week     2 Glasses of wine per week      Comment: 4-5 times per week    Drug use: No    Sexual activity: Not on file     Other Topics Concern    Not on file     Social History Narrative    Exercise habits               Current Outpatient Prescriptions on File Prior to Visit   Medication Sig Dispense Refill    docusate sodium (COLACE) 100 mg capsule Take 100 mg by mouth 2 (two) times a day      [DISCONTINUED] levothyroxine 100 mcg tablet Take 100 mcg by mouth daily       No current facility-administered medications on file prior to visit  Allergies   Allergen Reactions    Pollen Extract          Vitals:    02/07/18 0941   BP: 138/88   Pulse: 84   Resp: 16   Weight: 131 kg (289 lb 6 4 oz)   Height: 6' 1 27" (1 861 m)         Physical Exam   Constitutional: He is oriented to person, place, and time  Vital signs are normal  He appears well-developed and well-nourished  HENT:   Head: Normocephalic and atraumatic  Nose: Nose normal    Mouth/Throat: Oropharynx is clear and moist    Eyes: Pupils are equal, round, and reactive to light  Neck: Normal range of motion  Neck supple  No thyromegaly present  Cardiovascular: Normal rate and regular rhythm  No murmur heard  Pulmonary/Chest: Effort normal and breath sounds normal    Abdominal: Soft  Bowel sounds are normal    Musculoskeletal: Normal range of motion  He exhibits no edema or deformity  Neurological: He is alert and oriented to person, place, and time  He has normal reflexes  Skin: Skin is warm  No rash noted  No erythema  Psychiatric: He has a normal mood and affect   His behavior is normal

## 2018-02-07 NOTE — PATIENT INSTRUCTIONS

## 2018-02-08 ENCOUNTER — APPOINTMENT (OUTPATIENT)
Dept: LAB | Facility: CLINIC | Age: 43
End: 2018-02-08
Payer: COMMERCIAL

## 2018-02-08 DIAGNOSIS — R74.8 ELEVATED LIVER ENZYMES: Primary | ICD-10-CM

## 2018-02-08 LAB
ALBUMIN SERPL BCP-MCNC: 4.1 G/DL (ref 3.5–5)
ALP SERPL-CCNC: 91 U/L (ref 46–116)
ALT SERPL W P-5'-P-CCNC: 87 U/L (ref 12–78)
ANION GAP SERPL CALCULATED.3IONS-SCNC: 6 MMOL/L (ref 4–13)
AST SERPL W P-5'-P-CCNC: 32 U/L (ref 5–45)
BILIRUB SERPL-MCNC: 0.6 MG/DL (ref 0.2–1)
BUN SERPL-MCNC: 9 MG/DL (ref 5–25)
CALCIUM SERPL-MCNC: 9.2 MG/DL (ref 8.3–10.1)
CHLORIDE SERPL-SCNC: 102 MMOL/L (ref 100–108)
CHOLEST SERPL-MCNC: 203 MG/DL (ref 50–200)
CO2 SERPL-SCNC: 30 MMOL/L (ref 21–32)
CREAT SERPL-MCNC: 1.16 MG/DL (ref 0.6–1.3)
GFR SERPL CREATININE-BSD FRML MDRD: 78 ML/MIN/1.73SQ M
GLUCOSE P FAST SERPL-MCNC: 106 MG/DL (ref 65–99)
HDLC SERPL-MCNC: 43 MG/DL (ref 40–60)
LDLC SERPL CALC-MCNC: 111 MG/DL (ref 0–100)
POTASSIUM SERPL-SCNC: 4.3 MMOL/L (ref 3.5–5.3)
PROT SERPL-MCNC: 7.3 G/DL (ref 6.4–8.2)
SODIUM SERPL-SCNC: 138 MMOL/L (ref 136–145)
TRIGL SERPL-MCNC: 247 MG/DL
TSH SERPL DL<=0.05 MIU/L-ACNC: 3.19 UIU/ML (ref 0.36–3.74)

## 2018-02-08 PROCEDURE — 36415 COLL VENOUS BLD VENIPUNCTURE: CPT | Performed by: FAMILY MEDICINE

## 2018-02-08 PROCEDURE — 80053 COMPREHEN METABOLIC PANEL: CPT | Performed by: FAMILY MEDICINE

## 2018-02-08 PROCEDURE — 84443 ASSAY THYROID STIM HORMONE: CPT | Performed by: FAMILY MEDICINE

## 2018-02-08 PROCEDURE — 80061 LIPID PANEL: CPT | Performed by: FAMILY MEDICINE

## 2018-02-09 DIAGNOSIS — R74.8 ELEVATED LIVER ENZYMES: Primary | ICD-10-CM

## 2018-02-09 DIAGNOSIS — E78.1 HYPERTRIGLYCERIDEMIA: ICD-10-CM

## 2018-02-13 ENCOUNTER — HOSPITAL ENCOUNTER (OUTPATIENT)
Dept: ULTRASOUND IMAGING | Facility: HOSPITAL | Age: 43
Discharge: HOME | End: 2018-02-13
Payer: COMMERCIAL

## 2018-02-13 DIAGNOSIS — R74.8 ELEVATED LIVER ENZYMES: ICD-10-CM

## 2018-02-13 PROCEDURE — 76700 US EXAM ABDOM COMPLETE: CPT

## 2018-03-28 ENCOUNTER — OFFICE VISIT (OUTPATIENT)
Dept: SLEEP CENTER | Facility: CLINIC | Age: 43
End: 2018-03-28

## 2018-03-28 VITALS
BODY MASS INDEX: 35.89 KG/M2 | WEIGHT: 270.8 LBS | HEIGHT: 73 IN | HEART RATE: 64 BPM | SYSTOLIC BLOOD PRESSURE: 108 MMHG | DIASTOLIC BLOOD PRESSURE: 72 MMHG

## 2018-03-28 DIAGNOSIS — E66.9 OBESITY (BMI 30-39.9): ICD-10-CM

## 2018-03-28 DIAGNOSIS — G47.33 OSA (OBSTRUCTIVE SLEEP APNEA): Primary | ICD-10-CM

## 2018-03-28 DIAGNOSIS — G47.19 EXCESSIVE DAYTIME SLEEPINESS: ICD-10-CM

## 2018-03-28 DIAGNOSIS — F45.8 BRUXISM: ICD-10-CM

## 2018-03-28 RX ORDER — LEVOTHYROXINE SODIUM 0.1 MG/1
TABLET ORAL
COMMUNITY
Start: 2018-02-08 | End: 2018-05-24 | Stop reason: SDUPTHER

## 2018-03-28 NOTE — PROGRESS NOTES
Consultation - [id number]  40 y o  male  :1975  IUL:[id number]    Physician Requesting Consult: Celso Cowden, MD     Reason for Consult : [At your kind request] I saw this patient for initial sleep evaluation today  The patient is here to evaluate for suspected Obstructive Sleep Apnea  Medications, Past, Family and Social Histories were reviewed  He  has a past medical history of Hashimoto's thyroiditis; Hashimoto's thyroiditis; Positive Lyme disease serology; Right knee pain; Spermatocele; Umbilical hernia without obstruction or gangrene; Umbilical pain; Umbilical swelling; Varicose veins of both lower extremities; and Ventral hernia without obstruction or gangrene  He has a current medication list which includes the following prescription(s): levothyroxine and docusate sodium  HPI:  He reports snoring of several years duration that got much worse in the past 2 years  He has wife has also witnessed apneas  He feels these symptoms have improved a little since recent intentional weight loss  He grinds his teeth during sleep  He reported no restless leg symptoms or other features of parasomnia  Sleep Routine: He works swing shifts and Sleep routine varies as a result  He is spending between 6 and 9 hr in bed  He typically falls asleep in less than 20 min  Sleep is interrupted 1-2 times a night  He he usually awakens before the alarm but is not rested  At times he feels I can go back to sleep and does so when he has the opportunity  He has excessive daytime drowsiness, yawns excessively, feels like napping and would if he had the opportunity  He rated himself at Total score: 8 /24 on the [place] Sleepiness Scale  Habits:  [ reports that he has quit smoking  He has never used smokeless tobacco ], [ reports that he drinks about 1 2 oz of alcohol per week  ], [ reports that he does not use drugs ], [he uses moderate amount of caffeine    He recently started a regular exercise routine ]   Family History:  [Negative for sleep disturbance ]    ROS: reviewed & as attached  [Significant for around 30 lb intentional weight loss in the past 3 months ]     EXAM: Blood pressure 108/72, pulse 64, height 6' 1" (1 854 m), weight 123 kg (270 lb 12 8 oz)  Body mass index is 35 73 kg/m²  General:This is a [well] groomed male, well appearing [at] their stated age, in no distress  Speech is clear and coherent  Psych: Alert, orientated & cooperative  Mental state appears normal Judgement & insight [are good]  Extremities:Skin is warm and dry  Color and hydration are good  There is [no] pedal edema  Head and neck: Craniofacial anatomy [is normal]  TMJ & Sinuses are normal  Neck Circumference: 42 cm,  [appears thick] and muscular  There [are no abnormal masses or] Lymhadenopathy  Thyroid is [normal]  Trachea is [central]  Nasal airway: [patent]  Septum is [central ] Mucous membranes appeared [normal]  Turbinates are [normal ]  There is [no] rhinorrhea  Oral airway: [is crowded ] Base of tongue is at Modified Mallampati class [IV]  Hard palate [appears normal]  Soft palate [is redundant]  There is [no] tonsillar hypertrophy  Clydene Bump are normal]  CVS: Heart sounds are [regular]  There [are no] murmur[s]  Resp: Effort is [normal]  Air entry is [good] bilaterally  There are [no] wheezes or rales  Abdomen: soft & non-tender  CNS: is intact  Gait is normal  Rombergs is negative  MSK: Muscle bulk, tone and power are [normal]  IMPRESSION:     1  VIJI (obstructive sleep apnea)     2  Excessive daytime sleepiness  Ambulatory referral to Sleep Medicine   3  Bruxism     4  Obesity (BMI 30-39  9)          PLAN:   1  With respect to above conditions, I counseled on pathophysiology, diagnosis, treatment options, risks and benefits; interrelationship and effects on symptoms and comorbidities; risks of no treatment; costs and insurance aspects     2  I advised on weight reduction, avoiding sleeping supine, using alcohol or sedating medications close to bed time and on safe driving practices  3  Nocturnal polysomnography is indicated and a diagnostic study will be scheduled  4  Patient would prefer not to use Positive airway pressure therapy and is interested in the mandibular advancement device instead  However, he will consider positive airway pressure therapy if necessary  5  Follow-up will be scheduled after the studies to review results, further details of treatment options and to initiate/adjust therapy  Thank you for allowing me to participate in the care of this patient  I will keep you apprised of developments         Sincerely,    Isis Galeas MD  Board Certified Specialist

## 2018-03-28 NOTE — PROGRESS NOTES
Review of Systems      Genitourinary none   Cardiology none   Gastrointestinal none   Neurology none   Constitutional weight change of 10 or more pounds in the past year loss of 30 pounds   Integumentary none   Psychiatry none   Musculoskeletal none   Pulmonary none   ENT none   Endocrine none   Hematological none

## 2018-05-22 DIAGNOSIS — E55.9 VITAMIN D DEFICIENCY: Primary | ICD-10-CM

## 2018-05-22 DIAGNOSIS — E06.3 HASHIMOTO'S THYROIDITIS: ICD-10-CM

## 2018-05-23 ENCOUNTER — HOSPITAL ENCOUNTER (OUTPATIENT)
Dept: SLEEP CENTER | Facility: CLINIC | Age: 43
Discharge: HOME | End: 2018-05-23
Payer: COMMERCIAL

## 2018-05-23 DIAGNOSIS — G47.19 EXCESSIVE DAYTIME SLEEPINESS: ICD-10-CM

## 2018-05-23 PROCEDURE — 95810 POLYSOM 6/> YRS 4/> PARAM: CPT

## 2018-05-24 ENCOUNTER — APPOINTMENT (OUTPATIENT)
Dept: LAB | Facility: HOSPITAL | Age: 43
End: 2018-05-24
Payer: COMMERCIAL

## 2018-05-24 DIAGNOSIS — E55.9 VITAMIN D DEFICIENCY: Primary | ICD-10-CM

## 2018-05-24 DIAGNOSIS — E55.9 VITAMIN D DEFICIENCY: ICD-10-CM

## 2018-05-24 DIAGNOSIS — E06.3 HASHIMOTO'S THYROIDITIS: ICD-10-CM

## 2018-05-24 DIAGNOSIS — R74.8 ELEVATED LIVER ENZYMES: ICD-10-CM

## 2018-05-24 DIAGNOSIS — E03.9 HYPOTHYROIDISM, UNSPECIFIED TYPE: Primary | ICD-10-CM

## 2018-05-24 DIAGNOSIS — E78.1 HYPERTRIGLYCERIDEMIA: ICD-10-CM

## 2018-05-24 LAB
25(OH)D3 SERPL-MCNC: 20 NG/ML (ref 30–100)
ALBUMIN SERPL BCP-MCNC: 3.9 G/DL (ref 3.5–5)
ALP SERPL-CCNC: 82 U/L (ref 46–116)
ALT SERPL W P-5'-P-CCNC: 50 U/L (ref 12–78)
ANION GAP SERPL CALCULATED.3IONS-SCNC: 7 MMOL/L (ref 4–13)
AST SERPL W P-5'-P-CCNC: 24 U/L (ref 5–45)
BILIRUB SERPL-MCNC: 0.7 MG/DL (ref 0.2–1)
BUN SERPL-MCNC: 8 MG/DL (ref 5–25)
CALCIUM SERPL-MCNC: 9 MG/DL (ref 8.3–10.1)
CHLORIDE SERPL-SCNC: 102 MMOL/L (ref 100–108)
CHOLEST SERPL-MCNC: 168 MG/DL (ref 50–200)
CO2 SERPL-SCNC: 29 MMOL/L (ref 21–32)
CREAT SERPL-MCNC: 1.13 MG/DL (ref 0.6–1.3)
EST. AVERAGE GLUCOSE BLD GHB EST-MCNC: 117 MG/DL
GFR SERPL CREATININE-BSD FRML MDRD: 80 ML/MIN/1.73SQ M
GLUCOSE P FAST SERPL-MCNC: 106 MG/DL (ref 65–99)
HBA1C MFR BLD: 5.7 % (ref 4.2–6.3)
HDLC SERPL-MCNC: 38 MG/DL (ref 40–60)
LDLC SERPL CALC-MCNC: 103 MG/DL (ref 0–100)
POTASSIUM SERPL-SCNC: 3.7 MMOL/L (ref 3.5–5.3)
PROT SERPL-MCNC: 7 G/DL (ref 6.4–8.2)
SODIUM SERPL-SCNC: 138 MMOL/L (ref 136–145)
TRIGL SERPL-MCNC: 136 MG/DL
TSH SERPL DL<=0.05 MIU/L-ACNC: 2.15 UIU/ML (ref 0.36–3.74)

## 2018-05-24 PROCEDURE — 80061 LIPID PANEL: CPT

## 2018-05-24 PROCEDURE — 83036 HEMOGLOBIN GLYCOSYLATED A1C: CPT | Performed by: FAMILY MEDICINE

## 2018-05-24 PROCEDURE — 80053 COMPREHEN METABOLIC PANEL: CPT | Performed by: FAMILY MEDICINE

## 2018-05-24 PROCEDURE — 82306 VITAMIN D 25 HYDROXY: CPT

## 2018-05-24 PROCEDURE — 36415 COLL VENOUS BLD VENIPUNCTURE: CPT | Performed by: FAMILY MEDICINE

## 2018-05-24 PROCEDURE — 84443 ASSAY THYROID STIM HORMONE: CPT

## 2018-05-24 RX ORDER — LEVOTHYROXINE SODIUM 0.1 MG/1
100 TABLET ORAL DAILY
Qty: 90 TABLET | Refills: 0 | Status: SHIPPED | OUTPATIENT
Start: 2018-05-24 | End: 2018-08-15 | Stop reason: SDUPTHER

## 2018-05-24 RX ORDER — ERGOCALCIFEROL 1.25 MG/1
50000 CAPSULE ORAL WEEKLY
Qty: 8 CAPSULE | Refills: 0 | Status: SHIPPED | OUTPATIENT
Start: 2018-05-24 | End: 2019-02-22

## 2018-05-24 NOTE — PROGRESS NOTES
Sleep Study Documentation    Pre-Sleep Study       Sleep testing procedure explained to patient:YES    Patient napped prior to study:NO    Caffeine:Dayshift worker after 12PM   Caffeine use:NO    Alcohol:Dayshift workers after 5PM: Alcohol use:NO    Typical day for patient:YES       Study Documentation  Diagnostic   Snore: Moderate  Supplemental O2: no    O2 flow rate (L/min) range N/A  O2 flow rate (L/min) final N/A  Minimum SaO2 87%  Baseline SaO2 97%        Mode of Therapy:N/A    EKG abnormalities: no     EEG abnormalities: no    Study Terminated:no    Patient classification: employed       Post-Sleep Study    Medication used at bedtime or during sleep study:NO    Patient reports time it took to fall asleep:less than 20 minutes    Patient reports waking up during study:3 or more times  Patient reports returning to sleep without difficulty  Patient reports sleeping 6 to 8 hours with dreaming  Patient reports sleep during study:typical    Patient rated sleepiness: Somewhat sleepy or tired    PAP treatment:no

## 2018-05-25 ENCOUNTER — TELEPHONE (OUTPATIENT)
Dept: SLEEP CENTER | Facility: CLINIC | Age: 43
End: 2018-05-25

## 2018-05-25 NOTE — TELEPHONE ENCOUNTER
Spoke with Mr Tucker Montero he is aware sleep study read, sent to pcp  He will follow up with pcp at this time

## 2018-06-12 ENCOUNTER — OFFICE VISIT (OUTPATIENT)
Dept: FAMILY MEDICINE CLINIC | Facility: CLINIC | Age: 43
End: 2018-06-12
Payer: COMMERCIAL

## 2018-06-12 VITALS
HEIGHT: 73 IN | WEIGHT: 250 LBS | HEART RATE: 78 BPM | BODY MASS INDEX: 33.13 KG/M2 | DIASTOLIC BLOOD PRESSURE: 78 MMHG | RESPIRATION RATE: 20 BRPM | SYSTOLIC BLOOD PRESSURE: 128 MMHG

## 2018-06-12 DIAGNOSIS — K42.9 UMBILICAL HERNIA WITHOUT OBSTRUCTION AND WITHOUT GANGRENE: ICD-10-CM

## 2018-06-12 DIAGNOSIS — E66.9 OBESITY (BMI 30-39.9): ICD-10-CM

## 2018-06-12 DIAGNOSIS — E06.3 HASHIMOTO'S THYROIDITIS: ICD-10-CM

## 2018-06-12 DIAGNOSIS — G47.33 OSA (OBSTRUCTIVE SLEEP APNEA): Primary | ICD-10-CM

## 2018-06-12 PROBLEM — I83.893 SYMPTOMATIC VARICOSE VEINS OF BOTH LOWER EXTREMITIES: Status: RESOLVED | Noted: 2017-06-06 | Resolved: 2018-06-12

## 2018-06-12 PROBLEM — I87.2 VENOUS INSUFFICIENCY OF BOTH LOWER EXTREMITIES: Status: RESOLVED | Noted: 2017-01-27 | Resolved: 2018-06-12

## 2018-06-12 PROBLEM — G47.19 EXCESSIVE DAYTIME SLEEPINESS: Status: RESOLVED | Noted: 2018-03-28 | Resolved: 2018-06-12

## 2018-06-12 PROCEDURE — 99214 OFFICE O/P EST MOD 30 MIN: CPT | Performed by: FAMILY MEDICINE

## 2018-06-12 NOTE — PROGRESS NOTES
Assessment/Plan:         Diagnoses and all orders for this visit:    VIJI (obstructive sleep apnea)    Umbilical hernia without obstruction and without gangrene  -     US abdominal wall; Future    Hashimoto's thyroiditis    Obesity (BMI 30-39 9)      1) VIJI: sleep well without any CPAP machine  He is trying to lose more weight   2) Hypothyroidism: stable  Continue current meds  3) Obesity: stable  Doing well with goals      Subjective:      Patient ID: Charlott Hodgkins is a 40 y o  male  Lost 40 pounds since December 2017 due to diet and exercises  Feeling well  Varicose veins are improving since the surgery  Still feeling bulging over left abdomen after hernia surgery       Hypothyroidism  Patient presents for follow up of thyroid function  Symptoms consist of denies fatigue, weight changes, heat/cold intolerance, bowel/skin changes or CVS symptoms  Symptoms have present for several years  The symptoms are mild  The problem has been stable  Previous thyroid studies include TSH and T3 uptake  The hypothyroidism is due to hypothyroidism and Hashimoto's disease      Sleep Apnea  Patient presents with follow up obstructive sleep apnea  Patient generally gets 7 or 8 hours of sleep per night, and states they generally have generally restful sleep  Snoring of mild severity is present  Apneic episodes is not present  Nasal obstruction is not present  Patient does not have had tonsillectomy         The following portions of the patient's history were reviewed and updated as appropriate: allergies, current medications, past family history, past medical history, past social history, past surgical history and problem list     Review of Systems   Constitutional: Negative  HENT: Negative  Eyes: Negative  Respiratory: Negative  Cardiovascular: Negative  Gastrointestinal: Negative  Endocrine: Negative  Genitourinary: Negative  Musculoskeletal: Negative  Skin: Negative      Allergic/Immunologic: Negative  Neurological: Negative  Hematological: Negative  Psychiatric/Behavioral: Negative  Past Medical History:   Diagnosis Date    Hashimoto's thyroiditis     Hashimoto's thyroiditis     Positive Lyme disease serology     Right knee pain     last assessed - [id number]    Spermatocele     Umbilical hernia without obstruction or gangrene     last assessed - [id number]    Umbilical pain     resolved - [id number]    Umbilical swelling     resolved - [id number]    Varicose veins of both lower extremities     Ventral hernia without obstruction or gangrene     last assessed - [id number]     Past Surgical History:   Procedure Laterality Date    MS ENDOVENOUS LASER, 1ST VEIN Right 9/26/2017    Procedure: GSV ENDOVASCULAR LASER THERAPY W/ MULTIPLE STAB PHLEBECTOMIES;  Surgeon: Priyank Wilson MD;  Location: [facility];  Service: Vascular    SKIN SURGERY      MOLES REMOVED AS CHILD    TONSILLECTOMY      UMBILICAL HERNIA REPAIR      last assessed - [id number]    VENTRAL HERNIA REPAIR       Social History     Social History    Marital status: /Civil Union     Spouse name: N/A    Number of children: N/A    Years of education: N/A     Occupational History    Not on file       Social History Main Topics    Smoking status: Former Smoker    Smokeless tobacco: Never Used      Comment: stopped 8 years    Alcohol use 1 2 oz/week     2 Glasses of wine per week      Comment: 4-5 times per week    Drug use: No    Sexual activity: Not on file     Other Topics Concern    Not on file     Social History Narrative    Exercise habits             Allergies   Allergen Reactions    Pollen Extract          Family History   Problem Relation Age of Onset    Melanoma Mother     Hashimoto's thyroiditis Brother     Heart disease Family            Current Outpatient Prescriptions:     ergocalciferol (VITAMIN D2) 50,000 units, Take 1 capsule (50,000 Units total) by mouth once a week, Disp: 8 capsule, Rfl: 0    levothyroxine 100 mcg tablet, Take 1 tablet (100 mcg total) by mouth daily, Disp: 90 tablet, Rfl: 0    docusate sodium (COLACE) 100 mg capsule, Take 100 mg by mouth 2 (two) times a day, Disp: , Rfl:       Objective:      /78 (BP Location: Left arm, Patient Position: Sitting, Cuff Size: Standard)   Pulse 78   Resp 20   Ht 6' 1" (1 854 m)   Wt 113 kg (250 lb)   BMI 32 98 kg/m²          Physical Exam   Constitutional: He is oriented to person, place, and time  Vital signs are normal  He appears well-developed and well-nourished  HENT:   Head: Normocephalic and atraumatic  Nose: Nose normal    Mouth/Throat: Oropharynx is clear and moist    Eyes: Pupils are equal, round, and reactive to light  Neck: Normal range of motion  Neck supple  No thyromegaly present  Cardiovascular: Normal rate and regular rhythm  No murmur heard  Pulmonary/Chest: Effort normal and breath sounds normal    Abdominal: Soft  Bowel sounds are normal    + mild bulging left mid abdomen    Musculoskeletal: Normal range of motion  He exhibits no edema or deformity  Neurological: He is alert and oriented to person, place, and time  He has normal reflexes  Skin: Skin is warm  No rash noted  No erythema  Psychiatric: He has a normal mood and affect   His behavior is normal

## 2018-08-15 DIAGNOSIS — E03.9 HYPOTHYROIDISM, UNSPECIFIED TYPE: ICD-10-CM

## 2018-08-15 RX ORDER — LEVOTHYROXINE SODIUM 0.1 MG/1
100 TABLET ORAL DAILY
Qty: 90 TABLET | Refills: 0 | Status: SHIPPED | OUTPATIENT
Start: 2018-08-15 | End: 2018-12-04 | Stop reason: SDUPTHER

## 2018-08-31 ENCOUNTER — HOSPITAL ENCOUNTER (EMERGENCY)
Facility: HOSPITAL | Age: 43
Discharge: HOME | End: 2018-08-31
Attending: EMERGENCY MEDICINE | Admitting: EMERGENCY MEDICINE
Payer: OTHER MISCELLANEOUS

## 2018-08-31 VITALS
BODY MASS INDEX: 33.8 KG/M2 | WEIGHT: 255 LBS | SYSTOLIC BLOOD PRESSURE: 140 MMHG | OXYGEN SATURATION: 100 % | HEART RATE: 80 BPM | RESPIRATION RATE: 16 BRPM | DIASTOLIC BLOOD PRESSURE: 81 MMHG | HEIGHT: 73 IN
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
## 2018-08-31 LAB
ALT SERPL W P-5'-P-CCNC: 39 U/L (ref 12–78)
HBV SURFACE AB SER-ACNC: 29.02 MIU/ML
HBV SURFACE AG SER QL: NORMAL
HCV AB SER QL: NORMAL

## 2018-08-31 PROCEDURE — 99283 EMERGENCY DEPT VISIT LOW MDM: CPT

## 2018-08-31 PROCEDURE — 87389 HIV-1 AG W/HIV-1&-2 AB AG IA: CPT | Performed by: EMERGENCY MEDICINE

## 2018-08-31 PROCEDURE — 86803 HEPATITIS C AB TEST: CPT | Performed by: EMERGENCY MEDICINE

## 2018-08-31 PROCEDURE — 84460 ALANINE AMINO (ALT) (SGPT): CPT | Performed by: EMERGENCY MEDICINE

## 2018-08-31 PROCEDURE — 87340 HEPATITIS B SURFACE AG IA: CPT | Performed by: EMERGENCY MEDICINE

## 2018-08-31 PROCEDURE — 36415 COLL VENOUS BLD VENIPUNCTURE: CPT | Performed by: EMERGENCY MEDICINE

## 2018-08-31 PROCEDURE — 86706 HEP B SURFACE ANTIBODY: CPT | Performed by: EMERGENCY MEDICINE
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
## 2018-08-31 NOTE — DISCHARGE INSTRUCTIONS
Body Substance Exposure   WHAT YOU NEED TO KNOW:   Body substance exposure is when you come in contact with another person's blood or body fluid that contains blood  Semen or vaginal fluid can also spread infection  Contact may place you at risk for hepatitis B virus (HBV), human immunodeficiency virus (HIV), or hepatitis C virus (HCV)  DISCHARGE INSTRUCTIONS:   Ways that body substance exposure can occur:   · A needle stick or a cut from a sharp object    · Contact with an open wound, such as a cut, chapped skin, or an abrasion     · Contact with the eyes or mucus membrane, such as the lining of the mouth or nose    · Human bite  What to do when exposed to a body substance:   · Clean the area immediately  Wash an open wound with soap and clean water  Flush your eyes with saline solution or water  Rinse mucus membranes with water or saline solution  · Contact your healthcare provider as soon as possible  He will ask how the exposure happened and where the blood or body fluid touched your body  If possible, tell your healthcare provider about the person's health status and history, such as vaccinations  Treatment works best if started as soon as possible  Treatment that may be given for body substance exposure:  Postexposure prophylaxis (PEP) is medical treatment that may protect a person from infection after exposure to another person's body fluids  PEP may be needed if the person whose fluids you were exposed to has a known infection  Do not donate blood, organs, tissues, or semen until your follow-up is completed at 6 months  · PEP for HBV  may include HBV vaccinations or medicine to prevent HVB  This treatment works best if started within 24 hours of exposure  · PEP for HIV  may include 2 or 3 types of medicine to prevent HIV  This treatment works best if started within 72 hours of exposure  Continue treatment for 4 weeks   Practice safe sex to prevent spreading HIV and to prevent pregnancy during the follow-up period  If you are breastfeeding, your healthcare provider may recommend that you stop  Ask your healthcare provider if you can breastfeed  · PEP for HCV  is not  available  You will need to be tested for HCV and treated if you were infected  Follow up with your healthcare provider as directed: You will need more blood tests  PEP for HIV often causes side effects  Talk with your healthcare provider about your symptoms  He will need to make sure you are taking the medicine correctly  Write down your questions so you remember to ask them during your visits  Prevent body substance exposure: If you care for another person who has HBV, HIV, or HCV, protect yourself and others from infection:  · Wash your hands thoroughly  before and after you provide medical care  · Use protective equipment  Gloves or a face mask may protect your hands, nose, and mouth from splashes of blood or body fluid  · Do not recap needles after use  Recapping needles increases your risk of a needle stick  · Throw away needles in a safe container  A hard container with a lid may prevent accidental needle sticks  Contact your healthcare provider if:   · You have a fever  · You have a rash  · You have weakness or muscle pain  · You have abdominal pain, nausea, or vomiting  · You have diarrhea  · You have a headache  · You have questions or concerns about your condition or care  © 2017 [employer]  Information is for End User's use only and may not be sold, redistributed or otherwise used for commercial purposes  All illustrations and images included in CareNotes® are the copyrighted property of A D A M [employer] Inc  or [employer]aj[employer]  The above information is an  only  It is not intended as medical advice for individual conditions or treatments  Talk to your doctor, nurse or pharmacist before following any medical regimen to see if it is safe and effective for you

## 2018-09-01 LAB — HIV 1+2 AB+HIV1 P24 AG SERPL QL IA: NORMAL

## 2018-10-16 ENCOUNTER — APPOINTMENT (OUTPATIENT)
Dept: LAB | Facility: CLINIC | Age: 43
End: 2018-10-16

## 2018-10-16 ENCOUNTER — TRANSCRIBE ORDERS (OUTPATIENT)
Dept: LAB | Facility: CLINIC | Age: 43
End: 2018-10-16

## 2018-10-16 DIAGNOSIS — Z20.828 EXPOSURE TO SARS-ASSOCIATED CORONAVIRUS: ICD-10-CM

## 2018-10-16 DIAGNOSIS — Z01.84 IMMUNITY STATUS TESTING: Primary | ICD-10-CM

## 2018-10-16 DIAGNOSIS — Z01.84 IMMUNITY STATUS TESTING: ICD-10-CM

## 2018-10-16 PROCEDURE — 87389 HIV-1 AG W/HIV-1&-2 AB AG IA: CPT

## 2018-10-16 PROCEDURE — 36415 COLL VENOUS BLD VENIPUNCTURE: CPT

## 2018-10-16 PROCEDURE — 86765 RUBEOLA ANTIBODY: CPT

## 2018-10-17 LAB — HIV 1+2 AB+HIV1 P24 AG SERPL QL IA: NORMAL

## 2018-10-18 LAB — MEV IGG SER QL: NORMAL

## 2018-11-01 ENCOUNTER — OFFICE VISIT (OUTPATIENT)
Dept: FAMILY MEDICINE CLINIC | Facility: CLINIC | Age: 43
End: 2018-11-01
Payer: COMMERCIAL

## 2018-11-01 VITALS
SYSTOLIC BLOOD PRESSURE: 128 MMHG | TEMPERATURE: 98 F | OXYGEN SATURATION: 98 % | WEIGHT: 258 LBS | BODY MASS INDEX: 34.19 KG/M2 | HEIGHT: 73 IN | DIASTOLIC BLOOD PRESSURE: 82 MMHG | HEART RATE: 68 BPM | RESPIRATION RATE: 18 BRPM

## 2018-11-01 DIAGNOSIS — B02.8 HERPES ZOSTER WITH OTHER COMPLICATION: Primary | ICD-10-CM

## 2018-11-01 PROCEDURE — 99213 OFFICE O/P EST LOW 20 MIN: CPT | Performed by: FAMILY MEDICINE

## 2018-11-01 PROCEDURE — 1036F TOBACCO NON-USER: CPT | Performed by: FAMILY MEDICINE

## 2018-11-01 PROCEDURE — 3008F BODY MASS INDEX DOCD: CPT | Performed by: FAMILY MEDICINE

## 2018-11-01 RX ORDER — OXYCODONE AND ACETAMINOPHEN 7.5; 325 MG/1; MG/1
1 TABLET ORAL EVERY 4 HOURS PRN
Qty: 10 TABLET | Refills: 0 | Status: SHIPPED | OUTPATIENT
Start: 2018-11-01 | End: 2018-12-04 | Stop reason: ALTCHOICE

## 2018-11-01 RX ORDER — VALACYCLOVIR HYDROCHLORIDE 1 G/1
1000 TABLET, FILM COATED ORAL 2 TIMES DAILY
Qty: 20 TABLET | Refills: 0 | Status: SHIPPED | OUTPATIENT
Start: 2018-11-01 | End: 2018-12-04 | Stop reason: ALTCHOICE
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
## 2018-12-04 ENCOUNTER — TRANSCRIBE ORDERS (OUTPATIENT)
Dept: LAB | Facility: CLINIC | Age: 43
End: 2018-12-04

## 2018-12-04 ENCOUNTER — APPOINTMENT (OUTPATIENT)
Dept: LAB | Facility: CLINIC | Age: 43
End: 2018-12-04

## 2018-12-04 ENCOUNTER — OFFICE VISIT (OUTPATIENT)
Dept: FAMILY MEDICINE CLINIC | Facility: CLINIC | Age: 43
End: 2018-12-04
Payer: COMMERCIAL

## 2018-12-04 ENCOUNTER — APPOINTMENT (OUTPATIENT)
Dept: LAB | Facility: CLINIC | Age: 43
End: 2018-12-04
Payer: COMMERCIAL

## 2018-12-04 VITALS
HEART RATE: 66 BPM | WEIGHT: 270 LBS | TEMPERATURE: 98.4 F | OXYGEN SATURATION: 98 % | BODY MASS INDEX: 35.78 KG/M2 | RESPIRATION RATE: 15 BRPM | HEIGHT: 73 IN | SYSTOLIC BLOOD PRESSURE: 116 MMHG | DIASTOLIC BLOOD PRESSURE: 70 MMHG

## 2018-12-04 DIAGNOSIS — G47.33 OSA (OBSTRUCTIVE SLEEP APNEA): ICD-10-CM

## 2018-12-04 DIAGNOSIS — Z20.828 EXPOSURE TO SARS-ASSOCIATED CORONAVIRUS: Primary | ICD-10-CM

## 2018-12-04 DIAGNOSIS — F41.9 ANXIETY: ICD-10-CM

## 2018-12-04 DIAGNOSIS — E66.9 OBESITY (BMI 30-39.9): Primary | ICD-10-CM

## 2018-12-04 DIAGNOSIS — E06.3 HASHIMOTO'S THYROIDITIS: ICD-10-CM

## 2018-12-04 DIAGNOSIS — E55.9 VITAMIN D DEFICIENCY: ICD-10-CM

## 2018-12-04 DIAGNOSIS — R07.9 CHEST PAIN, UNSPECIFIED TYPE: ICD-10-CM

## 2018-12-04 DIAGNOSIS — Z20.828 EXPOSURE TO SARS-ASSOCIATED CORONAVIRUS: ICD-10-CM

## 2018-12-04 DIAGNOSIS — K40.91 UNILATERAL RECURRENT INGUINAL HERNIA WITHOUT OBSTRUCTION OR GANGRENE: ICD-10-CM

## 2018-12-04 DIAGNOSIS — E03.9 HYPOTHYROIDISM, UNSPECIFIED TYPE: ICD-10-CM

## 2018-12-04 LAB
25(OH)D3 SERPL-MCNC: 22 NG/ML (ref 30–100)
HCV AB SER QL: NORMAL
T4 FREE SERPL-MCNC: 0.84 NG/DL (ref 0.76–1.46)
TSH SERPL DL<=0.05 MIU/L-ACNC: 7.22 UIU/ML (ref 0.36–3.74)

## 2018-12-04 PROCEDURE — 84443 ASSAY THYROID STIM HORMONE: CPT

## 2018-12-04 PROCEDURE — 99214 OFFICE O/P EST MOD 30 MIN: CPT | Performed by: FAMILY MEDICINE

## 2018-12-04 PROCEDURE — 84439 ASSAY OF FREE THYROXINE: CPT

## 2018-12-04 PROCEDURE — 36415 COLL VENOUS BLD VENIPUNCTURE: CPT

## 2018-12-04 PROCEDURE — 82306 VITAMIN D 25 HYDROXY: CPT

## 2018-12-04 PROCEDURE — 87389 HIV-1 AG W/HIV-1&-2 AB AG IA: CPT

## 2018-12-04 PROCEDURE — 3008F BODY MASS INDEX DOCD: CPT | Performed by: FAMILY MEDICINE

## 2018-12-04 PROCEDURE — 86803 HEPATITIS C AB TEST: CPT

## 2018-12-04 RX ORDER — LEVOTHYROXINE SODIUM 0.1 MG/1
100 TABLET ORAL DAILY
Qty: 90 TABLET | Refills: 0 | Status: SHIPPED | OUTPATIENT
Start: 2018-12-04 | End: 2019-05-30 | Stop reason: SDUPTHER

## 2018-12-04 RX ORDER — ALPRAZOLAM 0.25 MG/1
0.25 TABLET ORAL
Qty: 20 TABLET | Refills: 0 | Status: SHIPPED | OUTPATIENT
Start: 2018-12-04 | End: 2019-02-22

## 2018-12-04 NOTE — PROGRESS NOTES
Assessment/Plan:         Diagnoses and all orders for this visit:    Obesity (BMI 30-39 9)    VIJI (obstructive sleep apnea)    Hashimoto's thyroiditis  -     TSH, 3rd generation with Free T4 reflex; Future    Vitamin D deficiency  -     Vitamin D 25 hydroxy; Future    Chest pain, unspecified type  -     Stress test only, exercise; Future    Unilateral recurrent inguinal hernia without obstruction or gangrene  -     US abdominal wall; Future    Anxiety  -     ALPRAZolam (XANAX) 0 25 mg tablet; Take 1 tablet (0 25 mg total) by mouth daily at bedtime as needed for anxiety    Other orders  -     Cancel: TSH, 3rd generation with Free T4 reflex; Future      1) Anxiety: Xanax PRN   2) Hashimoto thyroiditis: stable  He was taking 75 mcg daily instead of 100 mcg  To recheck level today   3) Chest pain : new and needs work up  Stress test as ordered  4) Hernia: to get U/S as ordered   5) VIJI: not using his CPAP and he is doing well  Weight loss encouraged  BMI Counseling: Body mass index is 35 62 kg/m²  Discussed the patient's BMI with him  The BMI is above average  BMI counseling and education was provided to the patient  Nutrition recommendations include reducing portion sizes, decreasing overall calorie intake, 3-5 servings of fruits/vegetables daily, reducing fast food intake, consuming healthier snacks, decreasing soda and/or juice intake, moderation in carbohydrate intake, increasing intake of lean protein, reducing intake of saturated fat and trans fat and reducing intake of cholesterol  Exercise recommendations include moderate aerobic physical activity for 150 minutes/week  Subjective:      Patient ID: Hans Goncalves is a 44 y o  male    C/o left sided chest pain for the last few months   Worsens with exertion   No dizziness or syncopal episodes  The pain goes away on its own and the chest wall is tender to touch when it happens  Worsening anxiety for few months   Crying for no reason at times   Denies depression, suicidal or homicidal ideation   Still c/o left sided abdominal bulging- no pain or discomfort     Obesity  Clem Cruz is a 44 y o  male who presents for follow of obesity  He has noted a weight gain of approximately 15 pounds over the last 5 months  He feels ideal weight is 190 pounds  History of eating disorders: none  Previous treatments for obesity include: none  Associated medical conditions: none  Associated medications: none  Cardiovascular risk factors besides obesity: none  Not watching his diet   Not exercising on a regular basis  The following portions of the patient's history were reviewed and updated as appropriate: allergies, current medications, past family history, past medical history, past social history, past surgical history and problem list     Review of Systems   Constitutional: Negative  HENT: Negative  Eyes: Negative  Respiratory: Negative  Cardiovascular: Negative  Gastrointestinal: Negative  Endocrine: Negative  Genitourinary: Negative  Musculoskeletal: Negative  Allergic/Immunologic: Negative  Neurological: Negative  Hematological: Negative  Psychiatric/Behavioral: Negative  Objective:      /70 (BP Location: Left arm, Patient Position: Sitting, Cuff Size: Standard)   Pulse 66   Temp 98 4 °F (36 9 °C)   Resp 15   Ht 6' 1" (1 854 m)   Wt 122 kg (270 lb)   SpO2 98%   BMI 35 62 kg/m²          Physical Exam   Constitutional: He is oriented to person, place, and time  He appears well-developed and well-nourished  HENT:   Head: Normocephalic and atraumatic  Eyes: Pupils are equal, round, and reactive to light  EOM are normal    Neck: No tracheal deviation present  No thyromegaly present  Cardiovascular: Normal rate and regular rhythm  Pulmonary/Chest: Effort normal and breath sounds normal    Abdominal: He exhibits no distension  Musculoskeletal: Normal range of motion  He exhibits no edema  Neurological: He is oriented to person, place, and time  No cranial nerve deficit  Psychiatric: He has a normal mood and affect   His behavior is normal

## 2018-12-05 LAB — HIV 1+2 AB+HIV1 P24 AG SERPL QL IA: NORMAL

## 2018-12-21 ENCOUNTER — HOSPITAL ENCOUNTER (OUTPATIENT)
Dept: NON INVASIVE DIAGNOSTICS | Facility: CLINIC | Age: 43
Discharge: HOME | End: 2018-12-21
Payer: COMMERCIAL

## 2018-12-21 DIAGNOSIS — R07.9 CHEST PAIN, UNSPECIFIED TYPE: ICD-10-CM

## 2018-12-21 LAB
ARRHY DURING EX: NORMAL
CHEST PAIN STATEMENT: NORMAL
MAX DIASTOLIC BP: 70 MMHG
MAX HEART RATE: 184 BPM
MAX PREDICTED HEART RATE: 178 BPM
MAX. SYSTOLIC BP: 152 MMHG
PROTOCOL NAME: NORMAL
REASON FOR TERMINATION: NORMAL
TARGET HR FORMULA: NORMAL
TEST INDICATION: NORMAL
TIME IN EXERCISE PHASE: NORMAL

## 2018-12-21 PROCEDURE — 93017 CV STRESS TEST TRACING ONLY: CPT

## 2018-12-21 PROCEDURE — 93018 CV STRESS TEST I&R ONLY: CPT | Performed by: INTERNAL MEDICINE

## 2018-12-21 PROCEDURE — 93016 CV STRESS TEST SUPVJ ONLY: CPT | Performed by: INTERNAL MEDICINE

## 2018-12-26 ENCOUNTER — HOSPITAL ENCOUNTER (OUTPATIENT)
Dept: ULTRASOUND IMAGING | Facility: HOSPITAL | Age: 43
Discharge: HOME | End: 2018-12-26
Payer: COMMERCIAL

## 2018-12-26 DIAGNOSIS — K40.91 UNILATERAL RECURRENT INGUINAL HERNIA WITHOUT OBSTRUCTION OR GANGRENE: ICD-10-CM

## 2018-12-26 PROCEDURE — 76705 ECHO EXAM OF ABDOMEN: CPT

## 2019-01-01 DIAGNOSIS — K43.9 HERNIA OF ABDOMINAL WALL: Primary | ICD-10-CM

## 2019-01-22 ENCOUNTER — CONSULT (OUTPATIENT)
Dept: SURGERY | Facility: CLINIC | Age: 44
End: 2019-01-22
Payer: COMMERCIAL

## 2019-01-22 VITALS
HEIGHT: 73 IN | DIASTOLIC BLOOD PRESSURE: 84 MMHG | TEMPERATURE: 97.2 F | BODY MASS INDEX: 36.74 KG/M2 | WEIGHT: 277.2 LBS | SYSTOLIC BLOOD PRESSURE: 126 MMHG

## 2019-01-22 DIAGNOSIS — K43.9 HERNIA OF ABDOMINAL WALL: ICD-10-CM

## 2019-01-22 PROBLEM — K43.2 INCISIONAL HERNIA, WITHOUT OBSTRUCTION OR GANGRENE: Status: ACTIVE | Noted: 2019-01-22

## 2019-01-22 PROBLEM — K43.2 RECURRENT INCISIONAL HERNIA: Status: ACTIVE | Noted: 2019-01-22

## 2019-01-22 PROCEDURE — 99214 OFFICE O/P EST MOD 30 MIN: CPT | Performed by: SURGERY

## 2019-01-22 RX ORDER — CEFAZOLIN SODIUM 2 G/50ML
2000 SOLUTION INTRAVENOUS
Status: CANCELLED | OUTPATIENT
Start: 2019-01-23 | End: 2019-01-24

## 2019-01-22 NOTE — ASSESSMENT & PLAN NOTE
I discussed with him recurrence of hernias  I surmised that this wound may have been the primary closure had come apart which allowed it to herniate  I told him I could not tell till I took a look  I suggested we do this laparoscopically and not robotically since that most likely would not close a defect again  I discussed the surgery in detail including risks, benefits, alternatives, with expect postoperatively  He understands and is agreeable to go ahead      Plan:  Laparoscopic incisional hernia repair

## 2019-01-22 NOTE — LETTER
January 22, 2019     Juan Marte MD  [address] [facility] Cori Solorzano  [address] [place] [address]    Patient: Glo Nicolas   YOB: 1975   Date of Visit: 1/22/2019       Dear Dr Yordy Altamirano:    Thank you for referring Glo Nicolas to me for evaluation  Below are my notes for this consultation  If you have questions, please do not hesitate to call me  I look forward to following your patient along with you  Sincerely,        Adalberto Barnes MD        CC: No Recipients  Adalberto Barnes MD  1/22/2019 12:52 PM  Sign at close encounter  Office Visit - General Surgery  Glo Nicolas MRN: [id number]  Encounter: [id number]    Assessment and Plan    Problem List Items Addressed This Visit     None      Visit Diagnoses     Hernia of abdominal wall              Chief Complaint:  Glo Nicolas is a 44 y o  male who presents for Abdominal Wall Hernia    Subjective  41-year-old male status post robot ventral and umbilical hernia repair December 1, 2017  Now noticing a bulge in his left upper abdomen  He recently had an ultrasound which documented a hernia at that site  Bowels are moving normally without any problems      Past Medical History  Past Medical History:   Diagnosis Date    Hashimoto's thyroiditis     Hashimoto's thyroiditis     Positive Lyme disease serology     Right knee pain     last assessed - [id number]    Spermatocele     Umbilical hernia without obstruction or gangrene     last assessed - [id number]    Umbilical pain     resolved - [id number]    Umbilical swelling     resolved - [id number]    Varicose veins of both lower extremities     Ventral hernia without obstruction or gangrene     last assessed - [id number]       Past Surgical History  Past Surgical History:   Procedure Laterality Date    CA ENDOVENOUS LASER, 1ST VEIN Right 9/26/2017    Procedure: GSV ENDOVASCULAR LASER THERAPY W/ MULTIPLE STAB PHLEBECTOMIES;  Surgeon: Sarahy Wilson MD;  [facility]: [facility];  Service: Vascular    SKIN SURGERY      MOLES REMOVED AS CHILD    TONSILLECTOMY      UMBILICAL HERNIA REPAIR      last assessed - [id number]    VENTRAL HERNIA REPAIR         Family History  Family History   Problem Relation Age of Onset    Melanoma Mother     Hashimoto's thyroiditis Brother     Heart disease Family        Medications  Current Outpatient Prescriptions on File Prior to Visit   Medication Sig Dispense Refill    ergocalciferol (VITAMIN D2) 50,000 units Take 1 capsule (50,000 Units total) by mouth once a week 8 capsule 0    levothyroxine 100 mcg tablet Take 1 tablet (100 mcg total) by mouth daily 90 tablet 0    ALPRAZolam (XANAX) 0 25 mg tablet Take 1 tablet (0 25 mg total) by mouth daily at bedtime as needed for anxiety (Patient not taking: Reported on 1/22/2019 ) 20 tablet 0    docusate sodium (COLACE) 100 mg capsule Take 100 mg by mouth 2 (two) times a day       No current facility-administered medications on file prior to visit  Allergies  Allergies   Allergen Reactions    Pollen Extract        Review of Systems   Constitutional: Negative for chills, fatigue and fever  HENT: Negative for congestion, ear pain, sneezing, trouble swallowing and voice change  Eyes: Negative for pain and discharge  Respiratory: Negative for cough, shortness of breath and wheezing  Cardiovascular: Negative for palpitations and leg swelling  Gastrointestinal: Negative for abdominal pain, constipation, diarrhea, nausea and vomiting  Endocrine: Negative for cold intolerance, heat intolerance and polyuria  Genitourinary: Negative for decreased urine volume, difficulty urinating and dysuria  Musculoskeletal: Negative for arthralgias and back pain  Skin: Negative for rash and wound  Allergic/Immunologic: Negative for environmental allergies and food allergies  Neurological: Negative for dizziness and weakness  Hematological: Negative for adenopathy  Does not bruise/bleed easily     Psychiatric/Behavioral: Negative for confusion and sleep disturbance  The patient is not nervous/anxious  All other systems reviewed and are negative  Objective  Vitals:    01/22/19 1156   BP: 126/84   Temp: (!) 97 2 °F (36 2 °C)       Physical Exam   Constitutional: He is oriented to person, place, and time  He appears well-developed and well-nourished  No distress  HENT:   Head: Normocephalic and atraumatic  Right Ear: External ear normal    Left Ear: External ear normal    Eyes: Conjunctivae are normal  No scleral icterus  Neck: Normal range of motion  Neck supple  No tracheal deviation present  No thyromegaly present  Cardiovascular: Normal rate, regular rhythm and normal heart sounds  No murmur heard  Pulmonary/Chest: Effort normal and breath sounds normal  No respiratory distress  He has no wheezes  He has no rales  Abdominal: Soft  He exhibits mass  He exhibits no distension  There is no tenderness  There is no rebound and no guarding  Laparoscopic incisions healing well  Supraumbilically and towards the left is about a 5 cm defect in the fascia  Easily noted bulging when standing or sitting  Abdomen otherwise soft nontender   Musculoskeletal: Normal range of motion  He exhibits no edema  Lymphadenopathy:     He has no cervical adenopathy  Neurological: He is alert and oriented to person, place, and time  Skin: Skin is warm and dry  He is not diaphoretic  Psychiatric: He has a normal mood and affect  His behavior is normal  Judgment and thought content normal    Nursing note and vitals reviewed

## 2019-01-22 NOTE — PROGRESS NOTES
Office Visit - General Surgery  Desiree Sarabia MRN: [id number]  Encounter: [id number]    Assessment and Plan    Problem List Items Addressed This Visit     None      Visit Diagnoses     Hernia of abdominal wall              Chief Complaint:  Desiree Sarabia is a 44 y o  male who presents for Abdominal Wall Hernia    Subjective  56-year-old male status post robot ventral and umbilical hernia repair December 1, 2017  Now noticing a bulge in his left upper abdomen  He recently had an ultrasound which documented a hernia at that site  Bowels are moving normally without any problems      Past Medical History  Past Medical History:   Diagnosis Date    Hashimoto's thyroiditis     Hashimoto's thyroiditis     Positive Lyme disease serology     Right knee pain     last assessed - [id number]    Spermatocele     Umbilical hernia without obstruction or gangrene     last assessed - [id number]    Umbilical pain     resolved - [id number]    Umbilical swelling     resolved - [id number]    Varicose veins of both lower extremities     Ventral hernia without obstruction or gangrene     last assessed - [id number]       Past Surgical History  Past Surgical History:   Procedure Laterality Date    AL ENDOVENOUS LASER, 1ST VEIN Right 9/26/2017    Procedure: GSV ENDOVASCULAR LASER THERAPY W/ MULTIPLE STAB PHLEBECTOMIES;  Surgeon: Conrad Wilson MD;  [facility];  Service: Vascular    SKIN SURGERY      MOLES REMOVED AS CHILD    TONSILLECTOMY      UMBILICAL HERNIA REPAIR      last assessed - [id number]    VENTRAL HERNIA REPAIR         Family History  Family History   Problem Relation Age of Onset    Melanoma Mother     Hashimoto's thyroiditis Brother     Heart disease Family        Medications  Current Outpatient Prescriptions on File Prior to Visit   Medication Sig Dispense Refill    ergocalciferol (VITAMIN D2) 50,000 units Take 1 capsule (50,000 Units total) by mouth once a week 8 capsule 0    levothyroxine 100 mcg tablet Take 1 tablet (100 mcg total) by mouth daily 90 tablet 0    ALPRAZolam (XANAX) 0 25 mg tablet Take 1 tablet (0 25 mg total) by mouth daily at bedtime as needed for anxiety (Patient not taking: Reported on 1/22/2019 ) 20 tablet 0    docusate sodium (COLACE) 100 mg capsule Take 100 mg by mouth 2 (two) times a day       No current facility-administered medications on file prior to visit  Allergies  Allergies   Allergen Reactions    Pollen Extract        Review of Systems   Constitutional: Negative for chills, fatigue and fever  HENT: Negative for congestion, ear pain, sneezing, trouble swallowing and voice change  Eyes: Negative for pain and discharge  Respiratory: Negative for cough, shortness of breath and wheezing  Cardiovascular: Negative for palpitations and leg swelling  Gastrointestinal: Negative for abdominal pain, constipation, diarrhea, nausea and vomiting  Endocrine: Negative for cold intolerance, heat intolerance and polyuria  Genitourinary: Negative for decreased urine volume, difficulty urinating and dysuria  Musculoskeletal: Negative for arthralgias and back pain  Skin: Negative for rash and wound  Allergic/Immunologic: Negative for environmental allergies and food allergies  Neurological: Negative for dizziness and weakness  Hematological: Negative for adenopathy  Does not bruise/bleed easily  Psychiatric/Behavioral: Negative for confusion and sleep disturbance  The patient is not nervous/anxious  All other systems reviewed and are negative  Objective  Vitals:    01/22/19 1156   BP: 126/84   Temp: (!) 97 2 °F (36 2 °C)       Physical Exam   Constitutional: He is oriented to person, place, and time  He appears well-developed and well-nourished  No distress  HENT:   Head: Normocephalic and atraumatic  Right Ear: External ear normal    Left Ear: External ear normal    Eyes: Conjunctivae are normal  No scleral icterus  Neck: Normal range of motion  Neck supple  No tracheal deviation present  No thyromegaly present  Cardiovascular: Normal rate, regular rhythm and normal heart sounds  No murmur heard  Pulmonary/Chest: Effort normal and breath sounds normal  No respiratory distress  He has no wheezes  He has no rales  Abdominal: Soft  He exhibits mass  He exhibits no distension  There is no tenderness  There is no rebound and no guarding  Laparoscopic incisions healing well  Supraumbilically and towards the left is about a 5 cm defect in the fascia  Easily noted bulging when standing or sitting  Abdomen otherwise soft nontender   Musculoskeletal: Normal range of motion  He exhibits no edema  Lymphadenopathy:     He has no cervical adenopathy  Neurological: He is alert and oriented to person, place, and time  Skin: Skin is warm and dry  He is not diaphoretic  Psychiatric: He has a normal mood and affect  His behavior is normal  Judgment and thought content normal    Nursing note and vitals reviewed

## 2019-02-07 ENCOUNTER — TRANSCRIBE ORDERS (OUTPATIENT)
Dept: LAB | Facility: CLINIC | Age: 44
End: 2019-02-07

## 2019-02-07 ENCOUNTER — APPOINTMENT (OUTPATIENT)
Dept: LAB | Facility: CLINIC | Age: 44
End: 2019-02-07
Payer: COMMERCIAL

## 2019-02-07 DIAGNOSIS — K43.2 INCISIONAL HERNIA, WITHOUT OBSTRUCTION OR GANGRENE: ICD-10-CM

## 2019-02-07 LAB
ALBUMIN SERPL BCP-MCNC: 4 G/DL (ref 3.5–5)
ALP SERPL-CCNC: 66 U/L (ref 46–116)
ALT SERPL W P-5'-P-CCNC: 87 U/L (ref 12–78)
ANION GAP SERPL CALCULATED.3IONS-SCNC: 9 MMOL/L (ref 4–13)
AST SERPL W P-5'-P-CCNC: 68 U/L (ref 5–45)
BASOPHILS # BLD AUTO: 0.03 THOUSANDS/ΜL (ref 0–0.1)
BASOPHILS NFR BLD AUTO: 0 % (ref 0–1)
BILIRUB SERPL-MCNC: 0.5 MG/DL (ref 0.2–1)
BUN SERPL-MCNC: 18 MG/DL (ref 5–25)
CALCIUM SERPL-MCNC: 9.5 MG/DL (ref 8.3–10.1)
CHLORIDE SERPL-SCNC: 101 MMOL/L (ref 100–108)
CO2 SERPL-SCNC: 31 MMOL/L (ref 21–32)
CREAT SERPL-MCNC: 0.96 MG/DL (ref 0.6–1.3)
EOSINOPHIL # BLD AUTO: 0.07 THOUSAND/ΜL (ref 0–0.61)
EOSINOPHIL NFR BLD AUTO: 1 % (ref 0–6)
ERYTHROCYTE [DISTWIDTH] IN BLOOD BY AUTOMATED COUNT: 12.5 % (ref 11.6–15.1)
GFR SERPL CREATININE-BSD FRML MDRD: 97 ML/MIN/1.73SQ M
GLUCOSE SERPL-MCNC: 82 MG/DL (ref 65–140)
HCT VFR BLD AUTO: 42.8 % (ref 36.5–49.3)
HGB BLD-MCNC: 14.5 G/DL (ref 12–17)
IMM GRANULOCYTES # BLD AUTO: 0.03 THOUSAND/UL (ref 0–0.2)
IMM GRANULOCYTES NFR BLD AUTO: 0 % (ref 0–2)
LYMPHOCYTES # BLD AUTO: 1.99 THOUSANDS/ΜL (ref 0.6–4.47)
LYMPHOCYTES NFR BLD AUTO: 23 % (ref 14–44)
MCH RBC QN AUTO: 30.1 PG (ref 26.8–34.3)
MCHC RBC AUTO-ENTMCNC: 33.9 G/DL (ref 31.4–37.4)
MCV RBC AUTO: 89 FL (ref 82–98)
MONOCYTES # BLD AUTO: 0.66 THOUSAND/ΜL (ref 0.17–1.22)
MONOCYTES NFR BLD AUTO: 8 % (ref 4–12)
NEUTROPHILS # BLD AUTO: 5.77 THOUSANDS/ΜL (ref 1.85–7.62)
NEUTS SEG NFR BLD AUTO: 68 % (ref 43–75)
NRBC BLD AUTO-RTO: 0 /100 WBCS
PLATELET # BLD AUTO: 222 THOUSANDS/UL (ref 149–390)
PMV BLD AUTO: 11.9 FL (ref 8.9–12.7)
POTASSIUM SERPL-SCNC: 4.3 MMOL/L (ref 3.5–5.3)
PROT SERPL-MCNC: 7.3 G/DL (ref 6.4–8.2)
RBC # BLD AUTO: 4.82 MILLION/UL (ref 3.88–5.62)
SODIUM SERPL-SCNC: 141 MMOL/L (ref 136–145)
WBC # BLD AUTO: 8.55 THOUSAND/UL (ref 4.31–10.16)

## 2019-02-07 PROCEDURE — 36415 COLL VENOUS BLD VENIPUNCTURE: CPT

## 2019-02-07 PROCEDURE — 85025 COMPLETE CBC W/AUTO DIFF WBC: CPT

## 2019-02-07 PROCEDURE — 80053 COMPREHEN METABOLIC PANEL: CPT

## 2019-02-22 RX ORDER — AMOXICILLIN 500 MG
CAPSULE ORAL DAILY
COMMUNITY
End: 2020-09-17

## 2019-02-22 RX ORDER — MULTIVITAMIN
1 TABLET ORAL DAILY
COMMUNITY
End: 2020-09-17

## 2019-02-22 NOTE — PRE-PROCEDURE INSTRUCTIONS
Pre-Surgery Instructions:   Medication Instructions    docusate sodium (COLACE) 100 mg capsule Instructed patient per Anesthesia Guidelines   levothyroxine 100 mcg tablet Instructed patient per Anesthesia Guidelines   Multiple Vitamin (MULTIVITAMIN) tablet Instructed patient per Anesthesia Guidelines   Omega-3 Fatty Acids (FISH OIL) 1200 MG CAPS Instructed patient per Anesthesia Guidelines

## 2019-02-22 NOTE — PRE-PROCEDURE INSTRUCTIONS
Pre-Surgery Instructions:   Medication Instructions    docusate sodium (COLACE) 100 mg capsule Instructed patient per Anesthesia Guidelines   levothyroxine 100 mcg tablet Instructed patient per Anesthesia Guidelines   Multiple Vitamin (MULTIVITAMIN) tablet Instructed patient per Anesthesia Guidelines   Omega-3 Fatty Acids (FISH OIL) 1200 MG CAPS Instructed patient per Anesthesia Guidelines  Continue to take this medication on your normal schedule  If this is an oral medication and you take it in the morning, then you may take this medicine with a sip of water  Thyroxine Med Class     Continue to take this medication on your normal schedule  If this is an oral medication and you take it in the morning, then you may take this medicine with a sip of water

## 2019-02-26 ENCOUNTER — ANESTHESIA EVENT (OUTPATIENT)
Dept: PERIOP | Facility: HOSPITAL | Age: 44
End: 2019-02-26
Payer: COMMERCIAL

## 2019-02-26 ENCOUNTER — HOSPITAL ENCOUNTER (OUTPATIENT)
Facility: HOSPITAL | Age: 44
Setting detail: OUTPATIENT SURGERY
Discharge: HOME | End: 2019-02-26
Attending: SURGERY | Admitting: SURGERY
Payer: COMMERCIAL

## 2019-02-26 ENCOUNTER — ANESTHESIA (OUTPATIENT)
Dept: PERIOP | Facility: HOSPITAL | Age: 44
End: 2019-02-26
Payer: COMMERCIAL

## 2019-02-26 VITALS
HEART RATE: 62 BPM | SYSTOLIC BLOOD PRESSURE: 143 MMHG | BODY MASS INDEX: 35.78 KG/M2 | OXYGEN SATURATION: 98 % | TEMPERATURE: 98.9 F | DIASTOLIC BLOOD PRESSURE: 86 MMHG | WEIGHT: 270 LBS | HEIGHT: 73 IN | RESPIRATION RATE: 16 BRPM

## 2019-02-26 DIAGNOSIS — K43.2 INCISIONAL HERNIA, WITHOUT OBSTRUCTION OR GANGRENE: Primary | ICD-10-CM

## 2019-02-26 PROCEDURE — 49654 PR LAP, INCISIONAL HERNIA REPAIR,REDUCIBLE: CPT | Performed by: SURGERY

## 2019-02-26 PROCEDURE — C1781 MESH (IMPLANTABLE): HCPCS | Performed by: SURGERY

## 2019-02-26 DEVICE — VENTRALIGHT ST MESH WITH ECHO PS POSITIONING SYSTEM
Type: IMPLANTABLE DEVICE | Site: ABDOMEN | Status: FUNCTIONAL
Brand: VENTRALIGHT ST MESH WITH ECHO PS POSITIONING SYSTEM

## 2019-02-26 DEVICE — CAPSURE PERMANENT FIXATION SYSTEM 30 PERMANENT FASTENERS
Type: IMPLANTABLE DEVICE | Site: ABDOMEN | Status: FUNCTIONAL
Brand: CAPSURE PERMANENT FIXATION SYSTEM

## 2019-02-26 RX ORDER — ROCURONIUM BROMIDE 10 MG/ML
INJECTION, SOLUTION INTRAVENOUS AS NEEDED
Status: DISCONTINUED | OUTPATIENT
Start: 2019-02-26 | End: 2019-02-26 | Stop reason: SURG

## 2019-02-26 RX ORDER — MAGNESIUM SULFATE HEPTAHYDRATE 40 MG/ML
INJECTION, SOLUTION INTRAVENOUS AS NEEDED
Status: DISCONTINUED | OUTPATIENT
Start: 2019-02-26 | End: 2019-02-26 | Stop reason: SURG

## 2019-02-26 RX ORDER — ONDANSETRON 2 MG/ML
INJECTION INTRAMUSCULAR; INTRAVENOUS AS NEEDED
Status: DISCONTINUED | OUTPATIENT
Start: 2019-02-26 | End: 2019-02-26 | Stop reason: SURG

## 2019-02-26 RX ORDER — LIDOCAINE HYDROCHLORIDE 10 MG/ML
INJECTION, SOLUTION INFILTRATION; PERINEURAL AS NEEDED
Status: DISCONTINUED | OUTPATIENT
Start: 2019-02-26 | End: 2019-02-26 | Stop reason: SURG

## 2019-02-26 RX ORDER — FENTANYL CITRATE 50 UG/ML
INJECTION, SOLUTION INTRAMUSCULAR; INTRAVENOUS AS NEEDED
Status: DISCONTINUED | OUTPATIENT
Start: 2019-02-26 | End: 2019-02-26 | Stop reason: SURG

## 2019-02-26 RX ORDER — FENTANYL CITRATE/PF 50 MCG/ML
50 SYRINGE (ML) INJECTION
Status: DISCONTINUED | OUTPATIENT
Start: 2019-02-26 | End: 2019-02-26 | Stop reason: HOSPADM

## 2019-02-26 RX ORDER — PROMETHAZINE HYDROCHLORIDE 25 MG/ML
12.5 INJECTION, SOLUTION INTRAMUSCULAR; INTRAVENOUS ONCE AS NEEDED
Status: COMPLETED | OUTPATIENT
Start: 2019-02-26 | End: 2019-02-26

## 2019-02-26 RX ORDER — ACETAMINOPHEN 325 MG/1
975 TABLET ORAL ONCE
Status: COMPLETED | OUTPATIENT
Start: 2019-02-26 | End: 2019-02-26

## 2019-02-26 RX ORDER — SODIUM CHLORIDE, SODIUM LACTATE, POTASSIUM CHLORIDE, CALCIUM CHLORIDE 600; 310; 30; 20 MG/100ML; MG/100ML; MG/100ML; MG/100ML
125 INJECTION, SOLUTION INTRAVENOUS CONTINUOUS
Status: DISCONTINUED | OUTPATIENT
Start: 2019-02-26 | End: 2019-02-26 | Stop reason: HOSPADM

## 2019-02-26 RX ORDER — HYDROMORPHONE HCL/PF 1 MG/ML
0.5 SYRINGE (ML) INJECTION EVERY 2 HOUR PRN
Status: DISCONTINUED | OUTPATIENT
Start: 2019-02-26 | End: 2019-02-26 | Stop reason: HOSPADM

## 2019-02-26 RX ORDER — PROPOFOL 10 MG/ML
INJECTION, EMULSION INTRAVENOUS CONTINUOUS PRN
Status: DISCONTINUED | OUTPATIENT
Start: 2019-02-26 | End: 2019-02-26 | Stop reason: SURG

## 2019-02-26 RX ORDER — HYDROMORPHONE HCL/PF 1 MG/ML
0.4 SYRINGE (ML) INJECTION
Status: DISCONTINUED | OUTPATIENT
Start: 2019-02-26 | End: 2019-02-26 | Stop reason: HOSPADM

## 2019-02-26 RX ORDER — OXYCODONE HYDROCHLORIDE 5 MG/1
5 TABLET ORAL EVERY 4 HOURS PRN
Qty: 30 TABLET | Refills: 0 | Status: SHIPPED | OUTPATIENT
Start: 2019-02-26 | End: 2019-03-08

## 2019-02-26 RX ORDER — SODIUM CHLORIDE, SODIUM LACTATE, POTASSIUM CHLORIDE, CALCIUM CHLORIDE 600; 310; 30; 20 MG/100ML; MG/100ML; MG/100ML; MG/100ML
INJECTION, SOLUTION INTRAVENOUS CONTINUOUS PRN
Status: DISCONTINUED | OUTPATIENT
Start: 2019-02-26 | End: 2019-02-26 | Stop reason: SURG

## 2019-02-26 RX ORDER — GLYCOPYRROLATE 0.2 MG/ML
INJECTION INTRAMUSCULAR; INTRAVENOUS AS NEEDED
Status: DISCONTINUED | OUTPATIENT
Start: 2019-02-26 | End: 2019-02-26 | Stop reason: SURG

## 2019-02-26 RX ORDER — OXYCODONE HYDROCHLORIDE AND ACETAMINOPHEN 5; 325 MG/1; MG/1
1 TABLET ORAL EVERY 4 HOURS PRN
Status: DISCONTINUED | OUTPATIENT
Start: 2019-02-26 | End: 2019-02-26 | Stop reason: HOSPADM

## 2019-02-26 RX ORDER — DEXMEDETOMIDINE HYDROCHLORIDE 100 UG/ML
INJECTION, SOLUTION INTRAVENOUS AS NEEDED
Status: DISCONTINUED | OUTPATIENT
Start: 2019-02-26 | End: 2019-02-26

## 2019-02-26 RX ORDER — MIDAZOLAM HYDROCHLORIDE 1 MG/ML
INJECTION INTRAMUSCULAR; INTRAVENOUS AS NEEDED
Status: DISCONTINUED | OUTPATIENT
Start: 2019-02-26 | End: 2019-02-26 | Stop reason: SURG

## 2019-02-26 RX ORDER — OXYCODONE HYDROCHLORIDE AND ACETAMINOPHEN 5; 325 MG/1; MG/1
2 TABLET ORAL EVERY 4 HOURS PRN
Status: DISCONTINUED | OUTPATIENT
Start: 2019-02-26 | End: 2019-02-26 | Stop reason: HOSPADM

## 2019-02-26 RX ORDER — BUPIVACAINE HYDROCHLORIDE AND EPINEPHRINE 5; 5 MG/ML; UG/ML
INJECTION, SOLUTION PERINEURAL AS NEEDED
Status: DISCONTINUED | OUTPATIENT
Start: 2019-02-26 | End: 2019-02-26 | Stop reason: HOSPADM

## 2019-02-26 RX ORDER — SODIUM CHLORIDE, SODIUM LACTATE, POTASSIUM CHLORIDE, CALCIUM CHLORIDE 600; 310; 30; 20 MG/100ML; MG/100ML; MG/100ML; MG/100ML
100 INJECTION, SOLUTION INTRAVENOUS CONTINUOUS
Status: DISCONTINUED | OUTPATIENT
Start: 2019-02-26 | End: 2019-02-26 | Stop reason: HOSPADM

## 2019-02-26 RX ORDER — PROPOFOL 10 MG/ML
INJECTION, EMULSION INTRAVENOUS AS NEEDED
Status: DISCONTINUED | OUTPATIENT
Start: 2019-02-26 | End: 2019-02-26 | Stop reason: SURG

## 2019-02-26 RX ORDER — ONDANSETRON 2 MG/ML
4 INJECTION INTRAMUSCULAR; INTRAVENOUS EVERY 4 HOURS PRN
Status: DISCONTINUED | OUTPATIENT
Start: 2019-02-26 | End: 2019-02-26 | Stop reason: HOSPADM

## 2019-02-26 RX ORDER — HYDROMORPHONE HYDROCHLORIDE 2 MG/ML
INJECTION, SOLUTION INTRAMUSCULAR; INTRAVENOUS; SUBCUTANEOUS AS NEEDED
Status: DISCONTINUED | OUTPATIENT
Start: 2019-02-26 | End: 2019-02-26 | Stop reason: SURG

## 2019-02-26 RX ADMIN — ROCURONIUM BROMIDE 10 MG: 10 INJECTION INTRAVENOUS at 14:52

## 2019-02-26 RX ADMIN — FENTANYL CITRATE 150 MCG: 50 INJECTION, SOLUTION INTRAMUSCULAR; INTRAVENOUS at 14:35

## 2019-02-26 RX ADMIN — PROPOFOL 200 MG: 10 INJECTION, EMULSION INTRAVENOUS at 14:35

## 2019-02-26 RX ADMIN — ROCURONIUM BROMIDE 50 MG: 10 INJECTION INTRAVENOUS at 14:35

## 2019-02-26 RX ADMIN — DEXMEDETOMIDINE HYDROCHLORIDE 0.4 MCG/HR: 100 INJECTION, SOLUTION INTRAVENOUS at 14:39

## 2019-02-26 RX ADMIN — Medication 2000 MG: at 14:40

## 2019-02-26 RX ADMIN — MAGNESIUM SULFATE IN WATER 2 G: 40 INJECTION, SOLUTION INTRAVENOUS at 14:56

## 2019-02-26 RX ADMIN — PROMETHAZINE HYDROCHLORIDE 12.5 MG: 25 INJECTION INTRAMUSCULAR; INTRAVENOUS at 17:43

## 2019-02-26 RX ADMIN — OXYCODONE HYDROCHLORIDE AND ACETAMINOPHEN 1 TABLET: 5; 325 TABLET ORAL at 21:19

## 2019-02-26 RX ADMIN — GLYCOPYRROLATE 0.3 MG: 0.2 INJECTION, SOLUTION INTRAMUSCULAR; INTRAVENOUS at 17:16

## 2019-02-26 RX ADMIN — HYDROMORPHONE HYDROCHLORIDE 0.5 MG: 2 INJECTION, SOLUTION INTRAMUSCULAR; INTRAVENOUS; SUBCUTANEOUS at 17:35

## 2019-02-26 RX ADMIN — HYDROMORPHONE HYDROCHLORIDE 0.5 MG: 2 INJECTION, SOLUTION INTRAMUSCULAR; INTRAVENOUS; SUBCUTANEOUS at 15:54

## 2019-02-26 RX ADMIN — FENTANYL CITRATE 50 MCG: 50 INJECTION, SOLUTION INTRAMUSCULAR; INTRAVENOUS at 15:09

## 2019-02-26 RX ADMIN — ACETAMINOPHEN 975 MG: 325 TABLET ORAL at 12:43

## 2019-02-26 RX ADMIN — PROPOFOL 120 MCG/KG/MIN: 10 INJECTION, EMULSION INTRAVENOUS at 14:39

## 2019-02-26 RX ADMIN — MIDAZOLAM 2 MG: 1 INJECTION INTRAMUSCULAR; INTRAVENOUS at 14:26

## 2019-02-26 RX ADMIN — DEXAMETHASONE SODIUM PHOSPHATE 10 MG: 10 INJECTION INTRAMUSCULAR; INTRAVENOUS at 14:53

## 2019-02-26 RX ADMIN — ROCURONIUM BROMIDE 30 MG: 10 INJECTION INTRAVENOUS at 15:32

## 2019-02-26 RX ADMIN — SODIUM CHLORIDE, SODIUM LACTATE, POTASSIUM CHLORIDE, AND CALCIUM CHLORIDE: .6; .31; .03; .02 INJECTION, SOLUTION INTRAVENOUS at 16:30

## 2019-02-26 RX ADMIN — OXYCODONE HYDROCHLORIDE AND ACETAMINOPHEN 1 TABLET: 5; 325 TABLET ORAL at 18:40

## 2019-02-26 RX ADMIN — ROCURONIUM BROMIDE 20 MG: 10 INJECTION INTRAVENOUS at 16:24

## 2019-02-26 RX ADMIN — ONDANSETRON 4 MG: 2 INJECTION INTRAMUSCULAR; INTRAVENOUS at 16:31

## 2019-02-26 RX ADMIN — SODIUM CHLORIDE, SODIUM LACTATE, POTASSIUM CHLORIDE, AND CALCIUM CHLORIDE: .6; .31; .03; .02 INJECTION, SOLUTION INTRAVENOUS at 14:23

## 2019-02-26 RX ADMIN — PROPOFOL 200 MG: 10 INJECTION, EMULSION INTRAVENOUS at 14:36

## 2019-02-26 RX ADMIN — FENTANYL CITRATE 50 MCG: 50 INJECTION, SOLUTION INTRAMUSCULAR; INTRAVENOUS at 17:42

## 2019-02-26 RX ADMIN — NEOSTIGMINE METHYLSULFATE 3 MG: 1 INJECTION, SOLUTION INTRAMUSCULAR; INTRAVENOUS; SUBCUTANEOUS at 17:16

## 2019-02-26 RX ADMIN — SODIUM CHLORIDE, SODIUM LACTATE, POTASSIUM CHLORIDE, AND CALCIUM CHLORIDE 125 ML/HR: .6; .31; .03; .02 INJECTION, SOLUTION INTRAVENOUS at 13:00

## 2019-02-26 RX ADMIN — LIDOCAINE HYDROCHLORIDE 100 MG: 10 INJECTION, SOLUTION INFILTRATION; PERINEURAL at 14:35

## 2019-02-26 NOTE — ANESTHESIA POSTPROCEDURE EVALUATION
Post-Op Assessment Note    CV Status:  Stable  Pain Score: 6    Pain management: adequate     Mental Status:  Alert and awake   Hydration Status:  Stable   PONV Controlled:  None   Airway Patency:  Patent   Post Op Vitals Reviewed: Yes      Staff: Anesthesiologist           /70 (02/26/19 1730)    Temp 98 8 °F (37 1 °C) (02/26/19 1730)    Pulse 65 (02/26/19 1730)   Resp 14 (02/26/19 1730)    SpO2 100 % (02/26/19 1730)

## 2019-02-26 NOTE — DISCHARGE INSTRUCTIONS
Please call the office when you leave to schedule an appointment to be seen in 2-3 weeks  Anesthesia Precautions:  1 ) Have a responsible person drive you home and someone to stay with you at home (in case of dizziness)  2 ) Rest and relax for 24 hours  3 ) Drink Clear liquids until there is no nausea or vomiting, then resume diet as normal   4 ) Diet as tolerated  5 ) Do not drink alcohol, drive any vehicle, operate mechanical equipment (e g  Sewing machine, kitchen stove, etc ) or make any critical decisions for 24 hours  Activity:    Do not lift more than 10 pounds (a gallon of milk) for 1-2 weeks post-operatively    Walking is encouraged  Normal daily activities including climbing steps are okay  Do not engage in strenuous activity or contact sports for 4-6 weeks post-operatively  Return to work:    Return to work to be discussed at first post-operative visit  Diet:    You may return to your normal heart healthy diet  Wound Care: Your wound is closed with Histoacryl  It is okay to shower  Wash incision gently with soap and water and pat dry  Do not soak incisions in bath water or swim for two weeks  Do not apply any creams or ointments  Ice as needed  Pain Medication:    Please take as directed  No driving while taking narcotic pain medications    Other:  If you have questions after discharge please call the office    If you have increased pain, fever >101 5, increased drainage, redness or a bad smell at your surgery site, please call us immediately or come directly to the Emergency Room    Please wear your binder over your hernia repair site

## 2019-02-26 NOTE — H&P
Assessment and Plan              None               Visit Diagnoses      Hernia of abdominal wall            plan: laparoscopic hernia repair     Chief Complaint:  Brandie Schulte is a 44 y o  male who presents for Abdominal Wall Hernia     Subjective  55-year-old male status post robot ventral and umbilical hernia repair December 1, 2017  Now noticing a bulge in his left upper abdomen  He recently had an ultrasound which documented a hernia at that site    Bowels are moving normally without any problems      Past Medical History  Medical History   Past Medical History:   Diagnosis Date    Hashimoto's thyroiditis      Hashimoto's thyroiditis      Positive Lyme disease serology      Right knee pain       last assessed - [id number]    Spermatocele      Umbilical hernia without obstruction or gangrene       last assessed - [id number]    Umbilical pain       resolved - [id number]    Umbilical swelling       resolved - [id number]    Varicose veins of both lower extremities      Ventral hernia without obstruction or gangrene       last assessed - [id number]            Past Surgical History  Surgical History         Past Surgical History:   Procedure Laterality Date    NV ENDOVENOUS LASER, 1ST VEIN Right 9/26/2017     Procedure: GSV ENDOVASCULAR LASER THERAPY W/ MULTIPLE STAB PHLEBECTOMIES;  Surgeon: Jeannette Wilson MD;  [facility];  Service: Vascular    SKIN SURGERY         MOLES REMOVED AS CHILD    TONSILLECTOMY        UMBILICAL HERNIA REPAIR         last assessed - [id number]    VENTRAL HERNIA REPAIR                Family History        Family History   Problem Relation Age of Onset    Melanoma Mother      Hashimoto's thyroiditis Brother      Heart disease Family           Medications         Current Outpatient Prescriptions on File Prior to Visit   Medication Sig Dispense Refill    ergocalciferol (VITAMIN D2) 50,000 units Take 1 capsule (50,000 Units total) by mouth once a week 8 capsule 0    levothyroxine 100 mcg tablet Take 1 tablet (100 mcg total) by mouth daily 90 tablet 0    ALPRAZolam (XANAX) 0 25 mg tablet Take 1 tablet (0 25 mg total) by mouth daily at bedtime as needed for anxiety (Patient not taking: Reported on 1/22/2019 ) 20 tablet 0    docusate sodium (COLACE) 100 mg capsule Take 100 mg by mouth 2 (two) times a day          No current facility-administered medications on file prior to visit           Allergies       Allergies   Allergen Reactions    Pollen Extract           Review of Systems   Constitutional: Negative for chills, fatigue and fever  HENT: Negative for congestion, ear pain, sneezing, trouble swallowing and voice change  Eyes: Negative for pain and discharge  Respiratory: Negative for cough, shortness of breath and wheezing  Cardiovascular: Negative for palpitations and leg swelling  Gastrointestinal: Negative for abdominal pain, constipation, diarrhea, nausea and vomiting  Endocrine: Negative for cold intolerance, heat intolerance and polyuria  Genitourinary: Negative for decreased urine volume, difficulty urinating and dysuria  Musculoskeletal: Negative for arthralgias and back pain  Skin: Negative for rash and wound  Allergic/Immunologic: Negative for environmental allergies and food allergies  Neurological: Negative for dizziness and weakness  Hematological: Negative for adenopathy  Does not bruise/bleed easily  Psychiatric/Behavioral: Negative for confusion and sleep disturbance  The patient is not nervous/anxious  All other systems reviewed and are negative         Objective                           Physical Exam   Constitutional: He is oriented to person, place, and time  He appears well-developed and well-nourished  No distress  HENT:   Head: Normocephalic and atraumatic  Right Ear: External ear normal    Left Ear: External ear normal    Eyes: Conjunctivae are normal  No scleral icterus  Neck: Normal range of motion  Neck supple  No tracheal deviation present  No thyromegaly present  Cardiovascular: Normal rate, regular rhythm and normal heart sounds  No murmur heard  Pulmonary/Chest: Effort normal and breath sounds normal  No respiratory distress  He has no wheezes  He has no rales  Abdominal: Soft  He exhibits mass  He exhibits no distension  There is no tenderness  There is no rebound and no guarding  Laparoscopic incisions healing well  Supraumbilically and towards the left is about a 5 cm defect in the fascia  Easily noted bulging when standing or sitting  Abdomen otherwise soft nontender   Musculoskeletal: Normal range of motion  He exhibits no edema  Lymphadenopathy:     He has no cervical adenopathy  Neurological: He is alert and oriented to person, place, and time  Skin: Skin is warm and dry  He is not diaphoretic  Psychiatric: He has a normal mood and affect   His behavior is normal  Judgment and thought content normal

## 2019-02-26 NOTE — OP NOTE
OPERATIVE REPORT  PATIENT NAME: Kobi Rodriguez    :  1975  MRN: [id number]  Pt Location: [facility] 05    SURGERY DATE: 2019    Surgeon(s) and Role:     * Zen Clinton MD - Primary     * Alvino Clay MD - Assisting    Preop Diagnosis:  Incisional hernia, without obstruction or gangrene [K43 2]    Post-Op Diagnosis Codes:     * Incisional hernia, without obstruction or gangrene [K43 2]    Procedure(s) (LRB):  REPAIR HERNIA INCISIONAL LAPAROSCOPIC (N/A)    Specimen(s):  * No specimens in log *    Estimated Blood Loss:   Minimal    Drains:  [REMOVED] Urethral Catheter (Removed)   Number of days: 0       Anesthesia Type:   General    Operative Indications:  Incisional hernia, without obstruction or gangrene [K43 2]      Operative Findings:  Hernia defect in the upper midline containing omentum    Complications:   None    Procedure and Technique:  The patient was brought to the operating room identified by name and armband  He was placed in supine position  After induction general anesthesia, endotracheal tube was placed  The abdomen was prepped draped usual sterile fashion  A time-out was performed confirming the patient procedure  The abdomen is insufflated is by placement of the Veress needle in the left upper quadrant  The lateral right abdominal 5 mm trocar was placed using [employer] to enter the abdomen  A 2nd 5 mm trocar was placed in the subxiphoid midline and a 12 mm trocar was placed into the lateral left abdomen  The hernia defect was visualized in the upper abdominal midline  There are extensive adhesions to the anterior abdominal wall and within the hernia defect  These were reduced under direct visualization and lysed carefully to avoid any injury to the small bowel  After extensive lysis of adhesions, the hernia defect was measured with assistance of the suture Passer  The hernia defect measured 4 x 7 cm    The decision was made to primarily close the hernia defect prior to placement of the underlay mesh  Using suture Passer, 3 interrupted Ethibond sutures were placed to primarily close the fascial defect  We then placed our underlay mesh  A ventral light ST T 6 inch x 8 inch mesh was introduced into the abdominal cavity  The suspension wire was used to place the mesh over the center of the defect  The balloon was insufflated to expand the underlay mesh  The endoscopic Tacker was used to tack the mesh circumferentially  The balloon was then desufflated and removed from the cavity  Additional tacks were placed circumferentially to ensure satisfactory position of the mesh  The fascia was hemostasis was confirmed  The 12 mm also was closed with a figure-of-eight Vicryl suture using the suture Passer  The abdomen was then desufflated and all ports were removed under direct visualization  The skin was closed with Monocryl  The patient was woken from general anesthesia without complication  All counts were correct at the end of the procedure  RF wanding demonstrated no retained foreign sponges  The attending was present for the procedure in its entirety        Patient Disposition:  PACU     SIGNATURE: Vasiliy Diamond MD  DATE: February 26, 2019  TIME: 5:42 PM

## 2019-02-26 NOTE — ANESTHESIA PREPROCEDURE EVALUATION
Review of Systems/Medical History  Patient summary reviewed  Chart reviewed  No history of anesthetic complications     Cardiovascular  EKG reviewed, Exercise tolerance (METS): >4,  DVT (9/2017 following varicose vein ablation)  Comment: Stress test negative for inducible ischemia 12/21/2018,  Pulmonary  Sleep apnea ,        GI/Hepatic      Comment: Confirmed NPO appropriate       Comment: Problems with urinary retention after hernia repair     Endo/Other  History of thyroid disease (Hashimoto's thyroiditis) , hypothyroidism,   Obesity    GYN       Hematology  Negative hematology ROS      Musculoskeletal  Negative musculoskeletal ROS        Neurology  Negative neurology ROS      Psychology   Negative psychology ROS              Physical Exam    Airway    Mallampati score: II  TM Distance: >3 FB  Neck ROM: full     Dental       Cardiovascular  Rhythm: regular, Rate: normal, Cardiovascular exam normal    Pulmonary  Breath sounds clear to auscultation,     Other Findings        Anesthesia Plan  ASA Score- 2     Anesthesia Type- general with ASA Monitors  Additional Monitors:   Airway Plan: ETT  Plan Factors-    Induction-     Postoperative Plan- Plan for postoperative opioid use  Planned trial extubation    Informed Consent- Anesthetic plan and risks discussed with patient  I personally reviewed this patient with the CRNA  Discussed and agreed on the Anesthesia Plan with the CRNA             Lab Results   Component Value Date    WBC 8 55 02/07/2019    HGB 14 5 02/07/2019    HCT 42 8 02/07/2019    MCV 89 02/07/2019     02/07/2019     Lab Results   Component Value Date    GLUCOSE 102 10/19/2015    CALCIUM 9 5 02/07/2019     10/19/2015    K 4 3 02/07/2019    CO2 31 02/07/2019     02/07/2019    BUN 18 02/07/2019    CREATININE 0 96 02/07/2019     Lab Results   Component Value Date    ALT 87 (H) 02/07/2019    AST 68 (H) 02/07/2019    ALKPHOS 66 02/07/2019    BILITOT 0 34 10/19/2015     Lab Results   Component Value Date    INR 0 92 09/23/2017    PROTIME 12 6 09/23/2017     Lab Results   Component Value Date    HGBA1C 5 7 05/24/2018

## 2019-03-13 ENCOUNTER — OFFICE VISIT (OUTPATIENT)
Dept: SURGERY | Facility: CLINIC | Age: 44
End: 2019-03-13

## 2019-03-13 VITALS
TEMPERATURE: 96.6 F | SYSTOLIC BLOOD PRESSURE: 146 MMHG | DIASTOLIC BLOOD PRESSURE: 88 MMHG | BODY MASS INDEX: 36.21 KG/M2 | HEIGHT: 73 IN | WEIGHT: 273.2 LBS

## 2019-03-13 DIAGNOSIS — Z98.890 STATUS POST LAPAROSCOPIC HERNIA REPAIR: Primary | ICD-10-CM

## 2019-03-13 DIAGNOSIS — Z87.19 STATUS POST LAPAROSCOPIC HERNIA REPAIR: Primary | ICD-10-CM

## 2019-03-13 PROCEDURE — 99024 POSTOP FOLLOW-UP VISIT: CPT | Performed by: SURGERY

## 2019-03-13 NOTE — ASSESSMENT & PLAN NOTE
I explained the pain he is having may be either nerve problem from the trocar insertion or tacks  I told him I would give it a month or so in read the evaluated  If it is still a problem, could do nerve block to see if that helps  I told him if in the interim he has significant pain that bothers him too much, he can give us call we could see him sooner  I explained the findings of surgery to him  See him back in a month's time

## 2019-03-13 NOTE — PROGRESS NOTES
Office Visit - General Surgery  Lesia Gama MRN: [id number]  Encounter: [id number]    Assessment and Plan    Problem List Items Addressed This Visit        Other    Status post laparoscopic hernia repair - Primary     I explained the pain he is having may be either nerve problem from the trocar insertion or tacks  I told him I would give it a month or so in read the evaluated  If it is still a problem, could do nerve block to see if that helps  I told him if in the interim he has significant pain that bothers him too much, he can give us call we could see him sooner  I explained the findings of surgery to him  See him back in a month's time  Chief Complaint:  Lesia Gama is a 44 y o  male who presents for Post-op (p/o Lap incisional hernia repair  Patient states still in pretty much pain on left lower side of abdomen  Appetite great  )    Subjective  44year-old male status post laparoscopic incisional hernia repair of  He has been having pain more towards the left lower abdomen    No other complaints or problems    Past Medical History  Past Medical History:   Diagnosis Date    Hashimoto's thyroiditis     Hashimoto's thyroiditis     Positive Lyme disease serology     Right knee pain     last assessed - [id number]    Sleep apnea     Spermatocele     Umbilical hernia without obstruction or gangrene     last assessed - [id number]    Umbilical pain     resolved - [id number]    Umbilical swelling     resolved - [id number]    Varicose veins of both lower extremities     Ventral hernia without obstruction or gangrene     last assessed - [id number]       Past Surgical History  Past Surgical History:   Procedure Laterality Date    [place] ENDOVENOUS LASER, 1ST VEIN Right 9/26/2017    Procedure: GSV ENDOVASCULAR LASER THERAPY W/ MULTIPLE STAB PHLEBECTOMIES;  Surgeon: Nikolas Wilson MD;  [facility];  Service: Vascular    IL LAP, INCISIONAL HERNIA REPAIR,REDUCIBLE N/A 2/26/2019 Procedure: REPAIR HERNIA INCISIONAL LAPAROSCOPIC;  Surgeon: Rylan Rosario MD;  [facility];  Service: General    SKIN SURGERY      MOLES REMOVED AS CHILD    TONSILLECTOMY      UMBILICAL HERNIA REPAIR      last assessed - [id number]    VENTRAL HERNIA REPAIR         Family History  Family History   Problem Relation Age of Onset    Melanoma Mother     Hashimoto's thyroiditis Brother     Heart disease Family        Social History  Social History     Socioeconomic History    Marital status: /Civil Union     Spouse name: None    Number of children: None    Years of education: None    Highest education level: None   Occupational History    None   Social Needs    Financial resource strain: None    Food insecurity:     Worry: None     Inability: None    Transportation needs:     Medical: None     Non-medical: None   Tobacco Use    Smoking status: Former Smoker    Smokeless tobacco: Never Used    Tobacco comment: stopped 8 years   Substance and Sexual Activity    Alcohol use: Yes     Frequency: 2-3 times a week     Drinks per session: 3 or 4     Binge frequency: Never    Drug use: No    Sexual activity: None   Lifestyle    Physical activity:     Days per week: None     Minutes per session: None    Stress: None   Relationships    Social connections:     Talks on phone: None     Gets together: None     Attends Sikh service: None     Active member of club or organization: None     Attends meetings of clubs or organizations: None     Relationship status: None    Intimate partner violence:     Fear of current or ex partner: None     Emotionally abused: None     Physically abused: None     Forced sexual activity: None   Other Topics Concern    None   Social History Narrative    Exercise habits            Medications  Current Outpatient Medications on File Prior to Visit   Medication Sig Dispense Refill    levothyroxine 100 mcg tablet Take 1 tablet (100 mcg total) by mouth daily 90 tablet 0    Multiple Vitamin (MULTIVITAMIN) tablet Take 1 tablet by mouth daily      Omega-3 Fatty Acids (FISH OIL) 1200 MG CAPS Take by mouth daily       docusate sodium (COLACE) 100 mg capsule Take 100 mg by mouth 2 (two) times a day       No current facility-administered medications on file prior to visit  Allergies  Allergies   Allergen Reactions    Pollen Extract        Review of Systems    Objective  Vitals:    03/13/19 0803   BP: 146/88   Temp: (!) 96 6 °F (35 9 °C)       Physical Exam   Abdomen:  Laparoscopic incisions healing well    Abdomen fairly soft

## 2019-04-08 ENCOUNTER — HOSPITAL ENCOUNTER (OUTPATIENT)
Dept: ULTRASOUND IMAGING | Facility: HOSPITAL | Age: 44
Discharge: HOME | End: 2019-04-08
Payer: COMMERCIAL

## 2019-04-08 ENCOUNTER — TELEPHONE (OUTPATIENT)
Dept: FAMILY MEDICINE CLINIC | Facility: CLINIC | Age: 44
End: 2019-04-08

## 2019-04-08 DIAGNOSIS — I80.9 PHLEBITIS: ICD-10-CM

## 2019-04-08 PROCEDURE — 93971 EXTREMITY STUDY: CPT

## 2019-04-09 PROCEDURE — 93971 EXTREMITY STUDY: CPT | Performed by: SURGERY

## 2019-05-30 DIAGNOSIS — E03.9 HYPOTHYROIDISM, UNSPECIFIED TYPE: ICD-10-CM

## 2019-05-30 RX ORDER — LEVOTHYROXINE SODIUM 0.1 MG/1
100 TABLET ORAL DAILY
Qty: 90 TABLET | Refills: 0 | Status: SHIPPED | OUTPATIENT
Start: 2019-05-30 | End: 2019-09-12 | Stop reason: DRUGHIGH

## 2019-09-05 ENCOUNTER — OFFICE VISIT (OUTPATIENT)
Dept: FAMILY MEDICINE CLINIC | Facility: CLINIC | Age: 44
End: 2019-09-05
Payer: COMMERCIAL

## 2019-09-05 VITALS
WEIGHT: 278.8 LBS | TEMPERATURE: 97 F | DIASTOLIC BLOOD PRESSURE: 90 MMHG | HEART RATE: 73 BPM | SYSTOLIC BLOOD PRESSURE: 128 MMHG | HEIGHT: 73 IN | RESPIRATION RATE: 16 BRPM | BODY MASS INDEX: 36.95 KG/M2 | OXYGEN SATURATION: 98 %

## 2019-09-05 DIAGNOSIS — E06.3 HASHIMOTO'S THYROIDITIS: ICD-10-CM

## 2019-09-05 DIAGNOSIS — Z00.00 ANNUAL PHYSICAL EXAM: Primary | ICD-10-CM

## 2019-09-05 DIAGNOSIS — R10.32 LEFT LOWER QUADRANT PAIN: ICD-10-CM

## 2019-09-05 DIAGNOSIS — E55.9 VITAMIN D DEFICIENCY: ICD-10-CM

## 2019-09-05 PROCEDURE — 99396 PREV VISIT EST AGE 40-64: CPT | Performed by: FAMILY MEDICINE

## 2019-09-05 NOTE — PROGRESS NOTES
ADULT ANNUAL PHYSICAL  [facility]    NAME: Ish Menard  AGE: 44 y o  SEX: male  : 1975     DATE: 2019     Assessment and Plan:     Problem List Items Addressed This Visit        Endocrine    Hashimoto's thyroiditis    Relevant Orders    Comprehensive metabolic panel    Lipid panel    TSH, 3rd generation with Free T4 reflex       Other    Vitamin D deficiency    Relevant Orders    Vitamin D 25 hydroxy      Other Visit Diagnoses     Annual physical exam    -  Primary    Left lower quadrant pain        to r/o diverticulitis     Relevant Orders    CT abdomen pelvis wo contrast          Immunizations and preventive care screenings were discussed with patient today  Appropriate education was printed on patient's after visit summary  Counseling:  BMI Counseling: Body mass index is 36 95 kg/m²  Discussed the patient's BMI with him  The BMI is above average  BMI counseling and education was provided to the patient  Nutrition recommendations include decreasing overall calorie intake, consuming healthier snacks, decreasing soda and/or juice intake and increasing intake of lean protein  Exercise recommendations include moderate aerobic physical activity for 150 minutes/week  Alcohol/drug use: discussed moderation in alcohol intake and avoidance of illicit drug use  Dental Health: discussed importance of regular tooth brushing, flossing, and dental visits  Injury prevention: discussed safety/seat belts, safety helmets, smoke detectors, carbon dioxide detectors, and smoking near bedding or upholstery  · Sexual health: discussed sexually transmitted diseases, partner selection, use of condoms, avoidance of unintended pregnancy, and contraceptive alternatives  Return in about 6 months (around 3/5/2020) for Recheck       Chief Complaint:     Chief Complaint   Patient presents with    Physical Exam     patient is here for physical exam      History of Present Illness:     Adult Annual Physical   Patient here for a comprehensive physical exam  The patient reports no problems  C/o on and off left lower quadrant pain for the last 3 month since his hernia repair     Diet and Physical Activity  · Diet/Nutrition: limited fruits/vegetables  · Exercise: walking  Depression Screening  PHQ-9 Depression Screening    PHQ-9:    Frequency of the following problems over the past two weeks:       Little interest or pleasure in doing things:  0 - not at all  Feeling down, depressed, or hopeless:  0 - not at all  PHQ-2 Score:  0       General Health  · Sleep: sleeps well and gets 7-8 hours of sleep on average  · Hearing: normal - bilateral   · Vision: most recent eye exam <1 year ago and wears glasses  · Dental: regular dental visits and brushes teeth twice daily   Health  · Symptoms include: none     Review of Systems:     Review of Systems   Constitutional: Negative  HENT: Negative  Eyes: Negative  Respiratory: Negative  Cardiovascular: Negative  Gastrointestinal: Negative  Endocrine: Negative  Genitourinary: Negative  Musculoskeletal: Negative  Skin: Negative  Allergic/Immunologic: Negative  Neurological: Negative  Hematological: Negative  Psychiatric/Behavioral: Negative         Past Medical History:     Past Medical History:   Diagnosis Date    Hashimoto's thyroiditis     Hashimoto's thyroiditis     Positive Lyme disease serology     Right knee pain     last assessed - [id number]    Sleep apnea     Spermatocele     Umbilical hernia without obstruction or gangrene     last assessed - [id number]    Umbilical pain     resolved - [id number]    Umbilical swelling     resolved - [id number]    Varicose veins of both lower extremities     Ventral hernia without obstruction or gangrene     last assessed - [id number]      Past Surgical History:     Past Surgical History:   Procedure Laterality Date    [place] ENDOVENOUS LASER, 1ST VEIN Right 9/26/2017    Procedure: GSV ENDOVASCULAR LASER THERAPY W/ MULTIPLE STAB PHLEBECTOMIES;  Surgeon: Alanna Wilson MD;  [facility];  Service: Vascular    SD LAP, INCISIONAL HERNIA REPAIR,REDUCIBLE N/A 2/26/2019    Procedure: REPAIR HERNIA INCISIONAL LAPAROSCOPIC;  Surgeon: Mohinder Newman MD;  [facility];  Service: General    SKIN SURGERY      MOLES REMOVED AS CHILD    TONSILLECTOMY      UMBILICAL HERNIA REPAIR      last assessed - [id number]    VENTRAL HERNIA REPAIR        Family History:     Family History   Problem Relation Age of Onset   Astrid Roles Melanoma Mother     Hashimoto's thyroiditis Brother     Heart disease Family       Social History:     Social History     Socioeconomic History    Marital status: /Civil Union     Spouse name: None    Number of children: None    Years of education: None    Highest education level: None   Occupational History    None   Social Needs    Financial resource strain: None    Food insecurity:     Worry: None     Inability: None    Transportation needs:     Medical: None     Non-medical: None   Tobacco Use    Smoking status: Former Smoker    Smokeless tobacco: Never Used    Tobacco comment: stopped 8 years   Substance and Sexual Activity    Alcohol use: Yes     Frequency: 2-3 times a week     Drinks per session: 3 or 4     Binge frequency: Never    Drug use: No    Sexual activity: None   Lifestyle    Physical activity:     Days per week: None     Minutes per session: None    Stress: None   Relationships    Social connections:     Talks on phone: None     Gets together: None     Attends Christianity service: None     Active member of club or organization: None     Attends meetings of clubs or organizations: None     Relationship status: None    Intimate partner violence:     Fear of current or ex partner: None     Emotionally abused: None     Physically abused: None     Forced sexual activity: None   Other Topics Concern    None Social History Narrative    Exercise habits          Current Medications:     Current Outpatient Medications   Medication Sig Dispense Refill    levothyroxine 100 mcg tablet Take 1 tablet (100 mcg total) by mouth daily 90 tablet 0    Multiple Vitamin (MULTIVITAMIN) tablet Take 1 tablet by mouth daily      Omega-3 Fatty Acids (FISH OIL) 1200 MG CAPS Take by mouth daily        No current facility-administered medications for this visit  Allergies: Allergies   Allergen Reactions    Pollen Extract       Physical Exam:     /90 (BP Location: Left arm, Patient Position: Sitting, Cuff Size: Standard)   Pulse 73   Temp (!) 97 °F (36 1 °C) (Tympanic)   Resp 16   Ht 6' 0 84" (1 85 m)   Wt 126 kg (278 lb 12 8 oz)   SpO2 98%   BMI 36 95 kg/m²     Physical Exam   Constitutional: He is oriented to person, place, and time  Vital signs are normal  He appears well-developed and well-nourished  HENT:   Head: Normocephalic and atraumatic  Nose: Nose normal    Mouth/Throat: Oropharynx is clear and moist    Eyes: Pupils are equal, round, and reactive to light  Neck: Normal range of motion  Neck supple  No thyromegaly present  Cardiovascular: Normal rate and regular rhythm  No murmur heard  Pulmonary/Chest: Effort normal and breath sounds normal    Abdominal: Soft  Bowel sounds are normal  There is tenderness  Left lower quadrant   Musculoskeletal: Normal range of motion  He exhibits no edema, tenderness or deformity  Neurological: He is alert and oriented to person, place, and time  He has normal reflexes  Skin: Skin is warm  No rash noted  No erythema  Psychiatric: He has a normal mood and affect  His behavior is normal    BMI Counseling: Body mass index is 36 95 kg/m²  Discussed the patient's BMI with him  The BMI is above average  BMI counseling and education was provided to the patient   Nutrition recommendations include decreasing overall calorie intake, 3-5 servings of fruits/vegetables daily, reducing fast food intake and consuming healthier snacks  Exercise recommendations include moderate aerobic physical activity for 150 minutes/week        Annita Torre MD  [address] [facility]

## 2019-09-12 ENCOUNTER — APPOINTMENT (OUTPATIENT)
Dept: LAB | Facility: HOSPITAL | Age: 44
End: 2019-09-12
Payer: COMMERCIAL

## 2019-09-12 DIAGNOSIS — E06.3 HASHIMOTO'S THYROIDITIS: Primary | ICD-10-CM

## 2019-09-12 DIAGNOSIS — E55.9 VITAMIN D DEFICIENCY: ICD-10-CM

## 2019-09-12 DIAGNOSIS — E06.3 HASHIMOTO'S THYROIDITIS: ICD-10-CM

## 2019-09-12 LAB
25(OH)D3 SERPL-MCNC: 24.4 NG/ML (ref 30–100)
ALBUMIN SERPL BCP-MCNC: 4.5 G/DL (ref 3.5–5)
ALP SERPL-CCNC: 81 U/L (ref 46–116)
ALT SERPL W P-5'-P-CCNC: 54 U/L (ref 12–78)
ANION GAP SERPL CALCULATED.3IONS-SCNC: 8 MMOL/L (ref 4–13)
AST SERPL W P-5'-P-CCNC: 30 U/L (ref 5–45)
BILIRUB SERPL-MCNC: 0.4 MG/DL (ref 0.2–1)
BUN SERPL-MCNC: 15 MG/DL (ref 5–25)
CALCIUM SERPL-MCNC: 9.3 MG/DL (ref 8.3–10.1)
CHLORIDE SERPL-SCNC: 103 MMOL/L (ref 100–108)
CHOLEST SERPL-MCNC: 237 MG/DL (ref 50–200)
CO2 SERPL-SCNC: 29 MMOL/L (ref 21–32)
CREAT SERPL-MCNC: 1.04 MG/DL (ref 0.6–1.3)
GFR SERPL CREATININE-BSD FRML MDRD: 88 ML/MIN/1.73SQ M
GLUCOSE P FAST SERPL-MCNC: 112 MG/DL (ref 65–99)
HDLC SERPL-MCNC: 48 MG/DL (ref 40–60)
LDLC SERPL CALC-MCNC: 164 MG/DL (ref 0–100)
NONHDLC SERPL-MCNC: 189 MG/DL
POTASSIUM SERPL-SCNC: 4.8 MMOL/L (ref 3.5–5.3)
PROT SERPL-MCNC: 8 G/DL (ref 6.4–8.2)
SODIUM SERPL-SCNC: 140 MMOL/L (ref 136–145)
TRIGL SERPL-MCNC: 126 MG/DL
TSH SERPL DL<=0.05 MIU/L-ACNC: 6.43 UIU/ML (ref 0.36–3.74)

## 2019-09-12 PROCEDURE — 80053 COMPREHEN METABOLIC PANEL: CPT | Performed by: FAMILY MEDICINE

## 2019-09-12 PROCEDURE — 84443 ASSAY THYROID STIM HORMONE: CPT

## 2019-09-12 PROCEDURE — 80061 LIPID PANEL: CPT | Performed by: FAMILY MEDICINE

## 2019-09-12 PROCEDURE — 82306 VITAMIN D 25 HYDROXY: CPT

## 2019-09-12 RX ORDER — LEVOTHYROXINE SODIUM 112 UG/1
112 TABLET ORAL DAILY
Qty: 90 TABLET | Refills: 0 | Status: SHIPPED | OUTPATIENT
Start: 2019-09-12 | End: 2019-10-10 | Stop reason: SDUPTHER

## 2019-09-17 ENCOUNTER — TRANSCRIBE ORDERS (OUTPATIENT)
Dept: LAB | Facility: HOSPITAL | Age: 44
End: 2019-09-17

## 2019-09-17 DIAGNOSIS — E06.3 HASHIMOTO'S THYROIDITIS: Primary | ICD-10-CM

## 2019-09-20 ENCOUNTER — APPOINTMENT (OUTPATIENT)
Dept: LAB | Facility: CLINIC | Age: 44
End: 2019-09-20
Payer: COMMERCIAL

## 2019-09-20 DIAGNOSIS — E06.3 HASHIMOTO'S THYROIDITIS: ICD-10-CM

## 2019-09-20 LAB — T4 FREE SERPL-MCNC: 1.03 NG/DL (ref 0.76–1.46)

## 2019-09-20 PROCEDURE — 84439 ASSAY OF FREE THYROXINE: CPT

## 2019-09-20 PROCEDURE — 36415 COLL VENOUS BLD VENIPUNCTURE: CPT

## 2019-10-10 DIAGNOSIS — E06.3 HASHIMOTO'S THYROIDITIS: ICD-10-CM

## 2019-10-10 RX ORDER — LEVOTHYROXINE SODIUM 112 UG/1
112 TABLET ORAL DAILY
Qty: 90 TABLET | Refills: 3 | Status: SHIPPED | OUTPATIENT
Start: 2019-10-10 | End: 2020-05-18 | Stop reason: SDUPTHER

## 2019-11-08 ENCOUNTER — TRANSCRIBE ORDERS (OUTPATIENT)
Dept: ADMINISTRATIVE | Facility: HOSPITAL | Age: 44
End: 2019-11-08

## 2019-11-08 ENCOUNTER — TELEPHONE (OUTPATIENT)
Dept: SLEEP CENTER | Facility: CLINIC | Age: 44
End: 2019-11-08

## 2019-11-08 DIAGNOSIS — G47.30 SLEEP APNEA, UNSPECIFIED TYPE: Primary | ICD-10-CM

## 2019-12-21 ENCOUNTER — OFFICE VISIT (OUTPATIENT)
Dept: SLEEP CENTER | Facility: CLINIC | Age: 44
End: 2019-12-21
Payer: COMMERCIAL

## 2019-12-21 VITALS
HEIGHT: 73 IN | WEIGHT: 283 LBS | SYSTOLIC BLOOD PRESSURE: 138 MMHG | HEART RATE: 67 BPM | BODY MASS INDEX: 37.51 KG/M2 | DIASTOLIC BLOOD PRESSURE: 88 MMHG

## 2019-12-21 DIAGNOSIS — G47.19 EXCESSIVE DAYTIME SLEEPINESS: ICD-10-CM

## 2019-12-21 DIAGNOSIS — G47.30 SLEEP APNEA, UNSPECIFIED TYPE: Primary | ICD-10-CM

## 2019-12-21 DIAGNOSIS — G47.61 PLMD (PERIODIC LIMB MOVEMENT DISORDER): ICD-10-CM

## 2019-12-21 DIAGNOSIS — E66.9 OBESITY (BMI 30-39.9): ICD-10-CM

## 2019-12-21 DIAGNOSIS — F51.12 INSUFFICIENT SLEEP SYNDROME: ICD-10-CM

## 2019-12-21 DIAGNOSIS — F45.8 BRUXISM: ICD-10-CM

## 2019-12-21 PROCEDURE — 99214 OFFICE O/P EST MOD 30 MIN: CPT | Performed by: INTERNAL MEDICINE

## 2019-12-21 NOTE — PROGRESS NOTES
Follow-up Note - Sleep Center   Fernando Welch  38 y o  male  :1975  VXN:[id number]    I saw Nilesh Huertas in sleep clinic today for his sleep complaints & comorbidities  Patient recently had a diagnostic sleep study and is here to review results / treatment options  The study demonstrated   obstructive sleep apnea: AHI 6 /hour     Intermittent snoring of moderate intensity was noted there were also 6 respiratory effort-related arousals    Minimum oxygen saturation 82 %  and 1 2 minute of total sleep time was spent with saturations less than 90%  There were periodic limb movements of sleep for an index of 19 per hour but rarely associated with arousal     PFSH, Problem List, Medications & Allergies were reviewed in EMR  Interval changes: none reported  He  has a past medical history of Hashimoto's thyroiditis, Hashimoto's thyroiditis, Positive Lyme disease serology, Right knee pain, Sleep apnea, Spermatocele, Umbilical hernia without obstruction or gangrene, Umbilical pain, Umbilical swelling, Varicose veins of both lower extremities, and Ventral hernia without obstruction or gangrene  He has a current medication list which includes the following prescription(s): levothyroxine, multivitamin, and fish oil  ROS: reviewed see attached  HPI:  He continues to snore and wife notes it has gotten worse  He has ongoing daytime fatigue even when he gets 8 hours sleep on weekends  He reported no restless leg symptoms and is not aware of jerking movements during sleep  Recently has not been reporting jaw clenching during sleep  Sleep Routine:  He gets 6-7 hours sleep on week nights  He has no difficulty initiating or maintaining sleep  He awakens with the aid of an alarm and is rarely refreshed  He has excessive drowsiness , feels like napping and would given the opportunity  He rated himself at Total score: 12 /24 on the Leiter sleepiness scale  Habits:  reports that he has quit smoking   He has never used smokeless tobacco  He reports that he drinks alcohol  He reports that he does not use drugs  EXAM: /88   Pulse 67   Ht 6' 1" (1 854 m)   Wt 128 kg (283 lb)   BMI 37 34 kg/m²     Patient is well groomed; well appearing  Skin/Extrem: warm & dry; col & hydration normal; no edema  Psych: cooperativeand in no distress  Mental state appears normal   CNS: Alert, orientated, clear & coherent speech  H&N: EOMI; NC/AT:no facial pressure marks, no rashes  ENMT Mucosa appearsnormal Nasal airway:patent  Oral airway:  crowded  Resp:effort is normal CVS: RRR ABD: truncal obesity MSK:Gait normal     IMPRESSION: Primary Sleep/Secondary(to Medical or Psych conditions) & comorbidities   1  Sleep apnea, unspecified type  Ambulatory referral to Sleep Medicine    Cpap DME   2  PLMD (periodic limb movement disorder)     3  Excessive daytime sleepiness     4  Insufficient sleep syndrome     5  Bruxism     6  Obesity (BMI 30-39  9)       PLAN:    1  I reviewed results of the Sleep studies with the patient  2  With respect to above conditions, I counseled on pathophysiology, diagnosis, treatment options, risks and benefits; inter-relationship and effects on symptoms and comorbidities; risks of no treatment; costs and insurance aspects  3  Patient elected positive airway pressure therapy and care coordinated with the DME provider for set up  I provided a prescription for auto titrating Pap 5-15 cm H2O   4  Need for compliance with therapy and weight reduction were emphasized  5  No treatment was initiated for the periodic limb movements at this time  6  I also advised allowing sufficient opportunity for sleep  7  Follow-up to be scheduled in 2 months to monitor compliance, progress and to adjust therapy  Thank you for allowing me to participate in the care of this patient  I will keep you apprised of developments      Sincerely,    Authenticated electronically by Noemi Malik MD on 12/21/19 Board Certified Specialist

## 2019-12-21 NOTE — PROGRESS NOTES
Review of Systems      Genitourinary none   Cardiology none   Gastrointestinal none   Neurology none   Constitutional fatigue   Integumentary none   Psychiatry none   Musculoskeletal none   Pulmonary snoring and none   ENT none   Endocrine none   Hematological none

## 2019-12-21 NOTE — PATIENT INSTRUCTIONS

## 2019-12-27 ENCOUNTER — TELEPHONE (OUTPATIENT)
Dept: SLEEP CENTER | Facility: CLINIC | Age: 44
End: 2019-12-27

## 2019-12-27 NOTE — TELEPHONE ENCOUNTER
I spoke with patient, patient would like to use [facility] on [address]  Advised they will call to schedule appointment  [facility] representative aware      Compliance follow up already scheduled 3/26/20

## 2020-01-10 DIAGNOSIS — G47.33 OSA (OBSTRUCTIVE SLEEP APNEA): Primary | ICD-10-CM

## 2020-01-27 ENCOUNTER — TELEPHONE (OUTPATIENT)
Dept: SLEEP CENTER | Facility: CLINIC | Age: 45
End: 2020-01-27

## 2020-01-28 ENCOUNTER — TELEPHONE (OUTPATIENT)
Dept: SLEEP CENTER | Facility: CLINIC | Age: 45
End: 2020-01-28

## 2020-01-28 NOTE — TELEPHONE ENCOUNTER
Amira requested this corrected script  Luisana Leavitt is in [address], I emailed Wagner Murray to see if she can gather that today

## 2020-01-29 ENCOUNTER — TELEPHONE (OUTPATIENT)
Dept: SLEEP CENTER | Facility: CLINIC | Age: 45
End: 2020-01-29

## 2020-01-29 NOTE — TELEPHONE ENCOUNTER
Scheduled patient for DME set up  Compliance scheduled   DME set up appointment information emailed to rochelle

## 2020-02-13 ENCOUNTER — TELEPHONE (OUTPATIENT)
Dept: SLEEP CENTER | Facility: CLINIC | Age: 45
End: 2020-02-13

## 2020-03-12 ENCOUNTER — OFFICE VISIT (OUTPATIENT)
Dept: FAMILY MEDICINE CLINIC | Facility: CLINIC | Age: 45
End: 2020-03-12
Payer: COMMERCIAL

## 2020-03-12 VITALS
WEIGHT: 283.4 LBS | SYSTOLIC BLOOD PRESSURE: 130 MMHG | BODY MASS INDEX: 37.39 KG/M2 | RESPIRATION RATE: 16 BRPM | DIASTOLIC BLOOD PRESSURE: 90 MMHG | HEART RATE: 70 BPM | TEMPERATURE: 96 F | OXYGEN SATURATION: 98 %

## 2020-03-12 DIAGNOSIS — E66.9 OBESITY (BMI 30-39.9): ICD-10-CM

## 2020-03-12 DIAGNOSIS — E06.3 HASHIMOTO'S THYROIDITIS: Primary | ICD-10-CM

## 2020-03-12 DIAGNOSIS — G47.33 OSA (OBSTRUCTIVE SLEEP APNEA): ICD-10-CM

## 2020-03-12 PROBLEM — F45.8 BRUXISM: Status: RESOLVED | Noted: 2018-03-28 | Resolved: 2020-03-12

## 2020-03-12 PROBLEM — Z87.19 STATUS POST LAPAROSCOPIC HERNIA REPAIR: Status: RESOLVED | Noted: 2019-03-13 | Resolved: 2020-03-12

## 2020-03-12 PROBLEM — Z98.890 STATUS POST LAPAROSCOPIC HERNIA REPAIR: Status: RESOLVED | Noted: 2019-03-13 | Resolved: 2020-03-12

## 2020-03-12 PROBLEM — K43.2 INCISIONAL HERNIA, WITHOUT OBSTRUCTION OR GANGRENE: Status: RESOLVED | Noted: 2019-01-22 | Resolved: 2020-03-12

## 2020-03-12 PROCEDURE — 99214 OFFICE O/P EST MOD 30 MIN: CPT | Performed by: FAMILY MEDICINE

## 2020-03-12 PROCEDURE — 1036F TOBACCO NON-USER: CPT | Performed by: FAMILY MEDICINE

## 2020-03-12 NOTE — PROGRESS NOTES
Assessment/Plan:    No problem-specific Assessment & Plan notes found for this encounter  Diagnoses and all orders for this visit:    Hashimoto's thyroiditis  -     TSH, 3rd generation with Free T4 reflex; Future  -     T4, free; Future    Obesity (BMI 30-39  9)  Comments:  diet and exercise encouraged   3-5 servings of fruits and vegetables daily  Orders:  -     Lipid Panel with Direct LDL reflex; Future  -     Hemoglobin A1C    VIJI (obstructive sleep apnea)  Comments:  to use nasal saline spray and sinus rinses   stop psedophed            Subjective:      Patient ID: Mono Shanks is a 38 y o  male  here for follow up  Using CPAP machine at night for 1 month ( nasal one)   Taking Pseudophed daily for 3 weeks for nasal congestion   Cutting back soda  Not exercising daily   Feeling fatigue and tired all the time  Working 50 hours a week       Mono Shanks is a 38 y o  male who presents for follow up of hypothyroidism  Current symptoms: none  Patient denies change in energy level, diarrhea, heat / cold intolerance and nervousness  Symptoms have been well-controlled  The following portions of the patient's history were reviewed and updated as appropriate: allergies, current medications, past family history, past medical history, past social history, past surgical history and problem list     Review of Systems   Constitutional: Negative for activity change and appetite change  HENT: Negative for congestion, dental problem and drooling  Respiratory: Negative for apnea and chest tightness  Cardiovascular: Negative for chest pain and leg swelling  Gastrointestinal: Negative for abdominal distention and abdominal pain  Endocrine: Negative for cold intolerance and heat intolerance  Genitourinary: Negative for difficulty urinating  Musculoskeletal: Negative for arthralgias and back pain  Neurological: Negative for dizziness and facial asymmetry  Hematological: Negative for adenopathy  Does not bruise/bleed easily  Psychiatric/Behavioral: Negative for agitation and behavioral problems  Objective:      /90 (BP Location: Left arm, Patient Position: Sitting, Cuff Size: Large)   Pulse 70   Temp (!) 96 °F (35 6 °C) (Tympanic)   Resp 16   Wt 129 kg (283 lb 6 4 oz)   SpO2 98%   BMI 37 39 kg/m²          Physical Exam   Constitutional: He is oriented to person, place, and time  He appears well-developed and well-nourished  HENT:   Head: Normocephalic and atraumatic  Eyes: Pupils are equal, round, and reactive to light  EOM are normal  Right eye exhibits no discharge  Left eye exhibits no discharge  Cardiovascular: Exam reveals no friction rub  No murmur heard  Pulmonary/Chest: No stridor  No respiratory distress  Abdominal: He exhibits no distension  There is no tenderness  Musculoskeletal: He exhibits no edema, tenderness or deformity  Neurological: He is alert and oriented to person, place, and time

## 2020-04-10 ENCOUNTER — TELEPHONE (OUTPATIENT)
Dept: SLEEP CENTER | Facility: CLINIC | Age: 45
End: 2020-04-10

## 2020-04-10 ENCOUNTER — TELEMEDICINE (OUTPATIENT)
Dept: SLEEP CENTER | Facility: CLINIC | Age: 45
End: 2020-04-10
Payer: COMMERCIAL

## 2020-04-10 DIAGNOSIS — E66.9 OBESITY (BMI 30-39.9): ICD-10-CM

## 2020-04-10 DIAGNOSIS — G47.33 OSA (OBSTRUCTIVE SLEEP APNEA): Primary | ICD-10-CM

## 2020-04-10 DIAGNOSIS — F45.8 BRUXISM: ICD-10-CM

## 2020-04-10 DIAGNOSIS — R09.81 NASAL CONGESTION: ICD-10-CM

## 2020-04-10 DIAGNOSIS — G47.61 PLMD (PERIODIC LIMB MOVEMENT DISORDER): ICD-10-CM

## 2020-04-10 PROCEDURE — 1036F TOBACCO NON-USER: CPT | Performed by: INTERNAL MEDICINE

## 2020-04-10 PROCEDURE — 99213 OFFICE O/P EST LOW 20 MIN: CPT | Performed by: INTERNAL MEDICINE

## 2020-04-13 ENCOUNTER — TELEPHONE (OUTPATIENT)
Dept: SLEEP CENTER | Facility: CLINIC | Age: 45
End: 2020-04-13

## 2020-04-16 ENCOUNTER — TELEMEDICINE (OUTPATIENT)
Dept: FAMILY MEDICINE CLINIC | Facility: CLINIC | Age: 45
End: 2020-04-16
Payer: COMMERCIAL

## 2020-04-16 VITALS — HEIGHT: 73 IN | BODY MASS INDEX: 37.51 KG/M2 | WEIGHT: 283 LBS

## 2020-04-16 DIAGNOSIS — E66.9 OBESITY (BMI 30-39.9): ICD-10-CM

## 2020-04-16 DIAGNOSIS — M67.922 TENDINOPATHY OF LEFT BICEPS: Primary | ICD-10-CM

## 2020-04-16 DIAGNOSIS — E06.3 HASHIMOTO'S THYROIDITIS: ICD-10-CM

## 2020-04-16 PROCEDURE — 99213 OFFICE O/P EST LOW 20 MIN: CPT | Performed by: FAMILY MEDICINE

## 2020-04-16 RX ORDER — OXYCODONE HYDROCHLORIDE AND ACETAMINOPHEN 5; 325 MG/1; MG/1
1 TABLET ORAL EVERY 8 HOURS PRN
Qty: 10 TABLET | Refills: 0 | Status: SHIPPED | OUTPATIENT
Start: 2020-04-16 | End: 2020-09-17

## 2020-04-16 RX ORDER — METHYLPREDNISOLONE 4 MG/1
TABLET ORAL
Qty: 21 EACH | Refills: 0 | Status: SHIPPED | OUTPATIENT
Start: 2020-04-16 | End: 2020-09-17

## 2020-04-17 ENCOUNTER — EVALUATION (OUTPATIENT)
Dept: PHYSICAL THERAPY | Facility: CLINIC | Age: 45
End: 2020-04-17
Payer: COMMERCIAL

## 2020-04-17 DIAGNOSIS — M77.12 LATERAL EPICONDYLITIS OF LEFT ELBOW: ICD-10-CM

## 2020-04-17 DIAGNOSIS — M25.522 LEFT ELBOW PAIN: Primary | ICD-10-CM

## 2020-04-17 DIAGNOSIS — M67.922 TENDINOPATHY OF LEFT BICEPS: ICD-10-CM

## 2020-04-17 PROCEDURE — 97535 SELF CARE MNGMENT TRAINING: CPT | Performed by: PHYSICAL THERAPIST

## 2020-04-17 PROCEDURE — 97162 PT EVAL MOD COMPLEX 30 MIN: CPT | Performed by: PHYSICAL THERAPIST

## 2020-04-17 PROCEDURE — 97110 THERAPEUTIC EXERCISES: CPT | Performed by: PHYSICAL THERAPIST

## 2020-04-20 ENCOUNTER — OFFICE VISIT (OUTPATIENT)
Dept: PHYSICAL THERAPY | Facility: CLINIC | Age: 45
End: 2020-04-20
Payer: COMMERCIAL

## 2020-04-20 DIAGNOSIS — M67.922 TENDINOPATHY OF LEFT BICEPS: Primary | ICD-10-CM

## 2020-04-20 DIAGNOSIS — M77.12 LATERAL EPICONDYLITIS OF LEFT ELBOW: ICD-10-CM

## 2020-04-20 DIAGNOSIS — M25.522 LEFT ELBOW PAIN: ICD-10-CM

## 2020-04-20 PROCEDURE — 97110 THERAPEUTIC EXERCISES: CPT | Performed by: PHYSICAL THERAPIST

## 2020-04-20 PROCEDURE — 97140 MANUAL THERAPY 1/> REGIONS: CPT | Performed by: PHYSICAL THERAPIST

## 2020-04-23 ENCOUNTER — OFFICE VISIT (OUTPATIENT)
Dept: PHYSICAL THERAPY | Facility: CLINIC | Age: 45
End: 2020-04-23
Payer: COMMERCIAL

## 2020-04-23 DIAGNOSIS — M77.12 LATERAL EPICONDYLITIS OF LEFT ELBOW: ICD-10-CM

## 2020-04-23 DIAGNOSIS — M67.922 TENDINOPATHY OF LEFT BICEPS: Primary | ICD-10-CM

## 2020-04-23 DIAGNOSIS — M25.522 LEFT ELBOW PAIN: ICD-10-CM

## 2020-04-23 PROCEDURE — 97110 THERAPEUTIC EXERCISES: CPT | Performed by: PHYSICAL THERAPIST

## 2020-04-23 PROCEDURE — 97140 MANUAL THERAPY 1/> REGIONS: CPT | Performed by: PHYSICAL THERAPIST

## 2020-04-27 ENCOUNTER — OFFICE VISIT (OUTPATIENT)
Dept: PHYSICAL THERAPY | Facility: CLINIC | Age: 45
End: 2020-04-27
Payer: COMMERCIAL

## 2020-04-27 DIAGNOSIS — M25.522 LEFT ELBOW PAIN: ICD-10-CM

## 2020-04-27 DIAGNOSIS — M67.922 TENDINOPATHY OF LEFT BICEPS: Primary | ICD-10-CM

## 2020-04-27 DIAGNOSIS — M77.12 LATERAL EPICONDYLITIS OF LEFT ELBOW: ICD-10-CM

## 2020-04-27 PROCEDURE — 97140 MANUAL THERAPY 1/> REGIONS: CPT | Performed by: PHYSICAL THERAPIST

## 2020-04-27 PROCEDURE — 97112 NEUROMUSCULAR REEDUCATION: CPT | Performed by: PHYSICAL THERAPIST

## 2020-04-27 PROCEDURE — 97110 THERAPEUTIC EXERCISES: CPT | Performed by: PHYSICAL THERAPIST

## 2020-04-30 ENCOUNTER — OFFICE VISIT (OUTPATIENT)
Dept: PHYSICAL THERAPY | Facility: CLINIC | Age: 45
End: 2020-04-30
Payer: COMMERCIAL

## 2020-04-30 DIAGNOSIS — M67.922 TENDINOPATHY OF LEFT BICEPS: Primary | ICD-10-CM

## 2020-04-30 DIAGNOSIS — M25.522 LEFT ELBOW PAIN: ICD-10-CM

## 2020-04-30 DIAGNOSIS — M77.12 LATERAL EPICONDYLITIS OF LEFT ELBOW: ICD-10-CM

## 2020-04-30 PROCEDURE — 97110 THERAPEUTIC EXERCISES: CPT | Performed by: PHYSICAL THERAPIST

## 2020-04-30 PROCEDURE — 97140 MANUAL THERAPY 1/> REGIONS: CPT | Performed by: PHYSICAL THERAPIST

## 2020-04-30 PROCEDURE — 97112 NEUROMUSCULAR REEDUCATION: CPT | Performed by: PHYSICAL THERAPIST

## 2020-05-04 ENCOUNTER — OFFICE VISIT (OUTPATIENT)
Dept: PHYSICAL THERAPY | Facility: CLINIC | Age: 45
End: 2020-05-04
Payer: COMMERCIAL

## 2020-05-04 DIAGNOSIS — M67.922 TENDINOPATHY OF LEFT BICEPS: Primary | ICD-10-CM

## 2020-05-04 DIAGNOSIS — M25.522 LEFT ELBOW PAIN: ICD-10-CM

## 2020-05-04 DIAGNOSIS — M77.12 LATERAL EPICONDYLITIS OF LEFT ELBOW: ICD-10-CM

## 2020-05-04 PROCEDURE — 97110 THERAPEUTIC EXERCISES: CPT | Performed by: PHYSICAL THERAPIST

## 2020-05-04 PROCEDURE — 97140 MANUAL THERAPY 1/> REGIONS: CPT | Performed by: PHYSICAL THERAPIST

## 2020-05-04 PROCEDURE — 97112 NEUROMUSCULAR REEDUCATION: CPT | Performed by: PHYSICAL THERAPIST

## 2020-05-07 ENCOUNTER — OFFICE VISIT (OUTPATIENT)
Dept: PHYSICAL THERAPY | Facility: CLINIC | Age: 45
End: 2020-05-07
Payer: COMMERCIAL

## 2020-05-07 DIAGNOSIS — M67.922 TENDINOPATHY OF LEFT BICEPS: Primary | ICD-10-CM

## 2020-05-07 DIAGNOSIS — M77.12 LATERAL EPICONDYLITIS OF LEFT ELBOW: ICD-10-CM

## 2020-05-07 DIAGNOSIS — M25.522 LEFT ELBOW PAIN: ICD-10-CM

## 2020-05-07 PROCEDURE — 97112 NEUROMUSCULAR REEDUCATION: CPT | Performed by: PHYSICAL THERAPIST

## 2020-05-07 PROCEDURE — 97140 MANUAL THERAPY 1/> REGIONS: CPT | Performed by: PHYSICAL THERAPIST

## 2020-05-07 PROCEDURE — 97110 THERAPEUTIC EXERCISES: CPT | Performed by: PHYSICAL THERAPIST

## 2020-05-18 DIAGNOSIS — E06.3 HASHIMOTO'S THYROIDITIS: ICD-10-CM

## 2020-05-19 RX ORDER — LEVOTHYROXINE SODIUM 112 UG/1
112 TABLET ORAL DAILY
Qty: 90 TABLET | Refills: 0 | Status: SHIPPED | OUTPATIENT
Start: 2020-05-19 | End: 2020-08-24 | Stop reason: SDUPTHER

## 2020-05-22 ENCOUNTER — OFFICE VISIT (OUTPATIENT)
Dept: PHYSICAL THERAPY | Facility: CLINIC | Age: 45
End: 2020-05-22
Payer: COMMERCIAL

## 2020-05-22 DIAGNOSIS — M67.922 TENDINOPATHY OF LEFT BICEPS: Primary | ICD-10-CM

## 2020-05-22 DIAGNOSIS — M25.522 LEFT ELBOW PAIN: ICD-10-CM

## 2020-05-22 DIAGNOSIS — M77.12 LATERAL EPICONDYLITIS OF LEFT ELBOW: ICD-10-CM

## 2020-05-22 PROCEDURE — 97164 PT RE-EVAL EST PLAN CARE: CPT | Performed by: PHYSICAL THERAPIST

## 2020-05-22 PROCEDURE — 97112 NEUROMUSCULAR REEDUCATION: CPT | Performed by: PHYSICAL THERAPIST

## 2020-05-22 PROCEDURE — 97110 THERAPEUTIC EXERCISES: CPT | Performed by: PHYSICAL THERAPIST

## 2020-08-24 DIAGNOSIS — E06.3 HASHIMOTO'S THYROIDITIS: ICD-10-CM

## 2020-08-24 RX ORDER — LEVOTHYROXINE SODIUM 112 UG/1
112 TABLET ORAL DAILY
Qty: 90 TABLET | Refills: 3 | Status: SHIPPED | OUTPATIENT
Start: 2020-08-24 | End: 2021-03-23 | Stop reason: SDUPTHER

## 2020-09-17 ENCOUNTER — OFFICE VISIT (OUTPATIENT)
Dept: FAMILY MEDICINE CLINIC | Facility: CLINIC | Age: 45
End: 2020-09-17
Payer: COMMERCIAL

## 2020-09-17 VITALS
HEIGHT: 73 IN | HEART RATE: 82 BPM | OXYGEN SATURATION: 98 % | DIASTOLIC BLOOD PRESSURE: 88 MMHG | RESPIRATION RATE: 18 BRPM | TEMPERATURE: 96.6 F | WEIGHT: 299.2 LBS | BODY MASS INDEX: 39.65 KG/M2 | SYSTOLIC BLOOD PRESSURE: 130 MMHG

## 2020-09-17 DIAGNOSIS — Z00.00 ANNUAL PHYSICAL EXAM: Primary | ICD-10-CM

## 2020-09-17 DIAGNOSIS — E66.9 OBESITY (BMI 30-39.9): ICD-10-CM

## 2020-09-17 DIAGNOSIS — G47.33 OSA (OBSTRUCTIVE SLEEP APNEA): ICD-10-CM

## 2020-09-17 DIAGNOSIS — Z23 FLU VACCINE NEED: ICD-10-CM

## 2020-09-17 PROCEDURE — 99396 PREV VISIT EST AGE 40-64: CPT | Performed by: FAMILY MEDICINE

## 2020-09-17 PROCEDURE — 90686 IIV4 VACC NO PRSV 0.5 ML IM: CPT

## 2020-09-17 PROCEDURE — 90471 IMMUNIZATION ADMIN: CPT

## 2020-09-17 NOTE — PROGRESS NOTES
[address] [facility]    NAME: Lesia Gama  AGE: 38 y o  SEX: male  : 1975     DATE: 2020     Assessment and Plan:     Problem List Items Addressed This Visit        Respiratory    VIJI (obstructive sleep apnea)       Other    Obesity (BMI 30-39  9)      Other Visit Diagnoses     Annual physical exam    -  Primary    Flu vaccine need        Relevant Orders    influenza vaccine, quadrivalent, 0 5 mL, preservative-free, for adult and pediatric patients 6 mos+ (AFLURIA, FLUARIX, FLULAVAL, FLUZONE) (Completed)          Immunizations and preventive care screenings were discussed with patient today  Appropriate education was printed on patient's after visit summary  Counseling:  Alcohol/drug use: discussed moderation in alcohol intake, the recommendations for healthy alcohol use, and avoidance of illicit drug use  Dental Health: discussed importance of regular tooth brushing, flossing, and dental visits  Injury prevention: discussed safety/seat belts, safety helmets, smoke detectors, carbon dioxide detectors, and smoking near bedding or upholstery  Sexual health: discussed sexually transmitted diseases, partner selection, use of condoms, avoidance of unintended pregnancy, and contraceptive alternatives  · Exercise: the importance of regular exercise/physical activity was discussed  Recommend exercise 3-5 times per week for at least 30 minutes  BMI Counseling: Body mass index is 39 4 kg/m²  The BMI is above normal  Nutrition recommendations include decreasing portion sizes and encouraging healthy choices of fruits and vegetables  Exercise recommendations include moderate physical activity 150 minutes/week  No pharmacotherapy was ordered  No follow-ups on file       Chief Complaint:     Chief Complaint   Patient presents with    Annual Exam     patient is here for physcial exam      History of Present Illness:     Adult Annual Physical   Patient here for a comprehensive physical exam  The patient reports no problems  Diet and Physical Activity  · Diet/Nutrition: well balanced diet and consuming 3-5 servings of fruits/vegetables daily  · Exercise: no formal exercise  Depression Screening  PHQ-9 Depression Screening    PHQ-9:    Frequency of the following problems over the past two weeks:       Little interest or pleasure in doing things:  0 - not at all  Feeling down, depressed, or hopeless:  0 - not at all  PHQ-2 Score:  0       General Health  · Sleep: sleeps well and gets 7-8 hours of sleep on average  · Hearing: normal - bilateral   · Vision: goes for regular eye exams and wears glasses  · Dental: regular dental visits and brushes teeth twice daily   Health  · Symptoms include: none     Review of Systems:     Review of Systems   Constitutional: Negative  HENT: Negative  Eyes: Negative  Respiratory: Negative  Cardiovascular: Negative  Gastrointestinal: Negative  Endocrine: Negative  Genitourinary: Negative  Musculoskeletal: Negative  Skin: Negative  Allergic/Immunologic: Negative  Neurological: Negative  Hematological: Negative  Psychiatric/Behavioral: Negative         Past Medical History:     Past Medical History:   Diagnosis Date    Biallelic mutation of RYR1 gene     Bruxism 3/28/2018    Hashimoto's thyroiditis     Hashimoto's thyroiditis     Incisional hernia, without obstruction or gangrene 1/22/2019    Added automatically from request for surgery [id number]    Positive Lyme disease serology     Right knee pain     last assessed - [id number]    Sleep apnea     Spermatocele     Status post laparoscopic hernia repair 4/35/0175    Umbilical hernia without obstruction or gangrene     last assessed - [id number]    Umbilical pain     resolved - [id number]    Umbilical swelling     resolved - [id number]    Varicose veins of both lower extremities     Ventral hernia without obstruction or gangrene     last assessed - [id number]      Past Surgical History:     Past Surgical History:   Procedure Laterality Date    [place] ENDOVENOUS LASER, 1ST VEIN Right 9/26/2017    Procedure: GSV ENDOVASCULAR LASER THERAPY W/ MULTIPLE STAB PHLEBECTOMIES;  Surgeon: Janice Wilson MD;  [facility];  Service: Vascular    SD LAP, INCISIONAL HERNIA REPAIR,REDUCIBLE N/A 2/26/2019    Procedure: REPAIR HERNIA INCISIONAL LAPAROSCOPIC;  Surgeon: Bryson Person MD;  [facility];  Service: General    SKIN SURGERY      MOLES REMOVED AS CHILD    TONSILLECTOMY      UMBILICAL HERNIA REPAIR      last assessed - [id number]    VENTRAL HERNIA REPAIR        Family History:     Family History   Problem Relation Age of Onset   [facility] Melanoma Mother     Hashimoto's thyroiditis Brother     Heart disease Family       Social History:        Social History     Socioeconomic History    Marital status: /Civil Union     Spouse name: None    Number of children: None    Years of education: None    Highest education level: None   Occupational History    None   Social Needs    Financial resource strain: None    Food insecurity     Worry: None     Inability: None    Transportation needs     Medical: None     Non-medical: None   Tobacco Use    Smoking status: Former Smoker    Smokeless tobacco: Never Used    Tobacco comment: stopped 8 years   Substance and Sexual Activity    Alcohol use: Yes     Frequency: 2-3 times a week     Drinks per session: 3 or 4     Binge frequency: Never    Drug use: No    Sexual activity: None   Lifestyle    Physical activity     Days per week: None     Minutes per session: None    Stress: None   Relationships    Social connections     Talks on phone: None     Gets together: None     Attends Anabaptism service: None     Active member of club or organization: None     Attends meetings of clubs or organizations: None     Relationship status: None    Intimate partner violence     Fear of current or ex partner: None     Emotionally abused: None     Physically abused: None     Forced sexual activity: None   Other Topics Concern    None   Social History Narrative    Exercise habits          Current Medications:     Current Outpatient Medications   Medication Sig Dispense Refill    levothyroxine 112 mcg tablet Take 1 tablet (112 mcg total) by mouth daily 90 tablet 3     No current facility-administered medications for this visit  Allergies: Allergies   Allergen Reactions    Pollen Extract     Succinylcholine      Reaction: "malignant hypothermia"      Physical Exam:     /88 (BP Location: Left arm, Patient Position: Sitting, Cuff Size: Large)   Pulse 82   Temp (!) 96 6 °F (35 9 °C) (Tympanic)   Resp 18   Ht 6' 1 07" (1 856 m)   Wt 136 kg (299 lb 3 2 oz)   SpO2 98%   BMI 39 40 kg/m²     Physical Exam  Constitutional:       Appearance: He is well-developed  HENT:      Head: Normocephalic and atraumatic  Right Ear: Tympanic membrane normal       Left Ear: Tympanic membrane normal       Nose: Nose normal       Mouth/Throat:      Mouth: Mucous membranes are moist    Eyes:      Pupils: Pupils are equal, round, and reactive to light  Neck:      Musculoskeletal: Normal range of motion and neck supple  Cardiovascular:      Rate and Rhythm: Normal rate and regular rhythm  Pulmonary:      Effort: Pulmonary effort is normal       Breath sounds: Normal breath sounds  Abdominal:      Palpations: Abdomen is soft  Musculoskeletal: Normal range of motion  Skin:     General: Skin is warm  Capillary Refill: Capillary refill takes less than 2 seconds  Neurological:      Mental Status: He is alert and oriented to person, place, and time     Psychiatric:         Behavior: Behavior normal           Ariela Liu MD  [facility]

## 2020-09-21 ENCOUNTER — APPOINTMENT (OUTPATIENT)
Dept: LAB | Facility: HOSPITAL | Age: 45
End: 2020-09-21
Payer: COMMERCIAL

## 2020-09-21 DIAGNOSIS — E06.3 HASHIMOTO'S THYROIDITIS: ICD-10-CM

## 2020-09-21 DIAGNOSIS — E66.9 OBESITY (BMI 30-39.9): ICD-10-CM

## 2020-09-21 LAB
CHOLEST SERPL-MCNC: 223 MG/DL (ref 50–200)
HDLC SERPL-MCNC: 40 MG/DL
LDLC SERPL CALC-MCNC: 108 MG/DL (ref 0–100)
T4 FREE SERPL-MCNC: 1.04 NG/DL (ref 0.76–1.46)
TRIGL SERPL-MCNC: 375 MG/DL
TSH SERPL DL<=0.05 MIU/L-ACNC: 2.18 UIU/ML (ref 0.36–3.74)

## 2020-09-21 PROCEDURE — 80061 LIPID PANEL: CPT

## 2020-09-21 PROCEDURE — 84439 ASSAY OF FREE THYROXINE: CPT

## 2020-09-21 PROCEDURE — 84443 ASSAY THYROID STIM HORMONE: CPT

## 2020-09-21 PROCEDURE — 36415 COLL VENOUS BLD VENIPUNCTURE: CPT

## 2020-11-07 ENCOUNTER — OFFICE VISIT (OUTPATIENT)
Dept: URGENT CARE | Age: 45
End: 2020-11-07
Payer: COMMERCIAL

## 2020-11-07 VITALS — RESPIRATION RATE: 20 BRPM | HEART RATE: 104 BPM | TEMPERATURE: 98.6 F | OXYGEN SATURATION: 96 %

## 2020-11-07 DIAGNOSIS — Z20.822 COVID-19 RULED OUT: Primary | ICD-10-CM

## 2020-11-07 PROCEDURE — G0382 LEV 3 HOSP TYPE B ED VISIT: HCPCS | Performed by: NURSE PRACTITIONER

## 2020-11-07 PROCEDURE — U0003 INFECTIOUS AGENT DETECTION BY NUCLEIC ACID (DNA OR RNA); SEVERE ACUTE RESPIRATORY SYNDROME CORONAVIRUS 2 (SARS-COV-2) (CORONAVIRUS DISEASE [COVID-19]), AMPLIFIED PROBE TECHNIQUE, MAKING USE OF HIGH THROUGHPUT TECHNOLOGIES AS DESCRIBED BY CMS-2020-01-R: HCPCS | Performed by: NURSE PRACTITIONER

## 2020-11-09 LAB — SARS-COV-2 RNA SPEC QL NAA+PROBE: DETECTED

## 2020-11-10 ENCOUNTER — TELEPHONE (OUTPATIENT)
Dept: URGENT CARE | Age: 45
End: 2020-11-10

## 2020-11-12 ENCOUNTER — HOSPITAL ENCOUNTER (EMERGENCY)
Facility: HOSPITAL | Age: 45
Discharge: HOME | End: 2020-11-12
Attending: EMERGENCY MEDICINE | Admitting: EMERGENCY MEDICINE
Payer: COMMERCIAL

## 2020-11-12 ENCOUNTER — APPOINTMENT (EMERGENCY)
Dept: RADIOLOGY | Facility: HOSPITAL | Age: 45
End: 2020-11-12
Payer: COMMERCIAL

## 2020-11-12 VITALS
OXYGEN SATURATION: 94 % | DIASTOLIC BLOOD PRESSURE: 78 MMHG | BODY MASS INDEX: 38.7 KG/M2 | WEIGHT: 293.87 LBS | TEMPERATURE: 98.8 F | SYSTOLIC BLOOD PRESSURE: 158 MMHG | HEART RATE: 108 BPM | RESPIRATION RATE: 18 BRPM

## 2020-11-12 DIAGNOSIS — U07.1 COVID-19: Primary | ICD-10-CM

## 2020-11-12 LAB
ANION GAP SERPL CALCULATED.3IONS-SCNC: 10 MMOL/L (ref 4–13)
ATRIAL RATE: 102 BPM
BASOPHILS # BLD AUTO: 0.01 THOUSANDS/ΜL (ref 0–0.1)
BASOPHILS NFR BLD AUTO: 0 % (ref 0–1)
BUN SERPL-MCNC: 8 MG/DL (ref 5–25)
CALCIUM SERPL-MCNC: 8.9 MG/DL (ref 8.3–10.1)
CHLORIDE SERPL-SCNC: 102 MMOL/L (ref 100–108)
CO2 SERPL-SCNC: 28 MMOL/L (ref 21–32)
CREAT SERPL-MCNC: 1.04 MG/DL (ref 0.6–1.3)
D DIMER PPP FEU-MCNC: 0.37 UG/ML FEU
EOSINOPHIL # BLD AUTO: 0.01 THOUSAND/ΜL (ref 0–0.61)
EOSINOPHIL NFR BLD AUTO: 0 % (ref 0–6)
ERYTHROCYTE [DISTWIDTH] IN BLOOD BY AUTOMATED COUNT: 12.6 % (ref 11.6–15.1)
GFR SERPL CREATININE-BSD FRML MDRD: 88 ML/MIN/1.73SQ M
GLUCOSE SERPL-MCNC: 113 MG/DL (ref 65–140)
HCT VFR BLD AUTO: 43.9 % (ref 36.5–49.3)
HGB BLD-MCNC: 14.7 G/DL (ref 12–17)
IMM GRANULOCYTES # BLD AUTO: 0.01 THOUSAND/UL (ref 0–0.2)
IMM GRANULOCYTES NFR BLD AUTO: 0 % (ref 0–2)
LYMPHOCYTES # BLD AUTO: 1.17 THOUSANDS/ΜL (ref 0.6–4.47)
LYMPHOCYTES NFR BLD AUTO: 24 % (ref 14–44)
MCH RBC QN AUTO: 29.9 PG (ref 26.8–34.3)
MCHC RBC AUTO-ENTMCNC: 33.5 G/DL (ref 31.4–37.4)
MCV RBC AUTO: 89 FL (ref 82–98)
MONOCYTES # BLD AUTO: 0.67 THOUSAND/ΜL (ref 0.17–1.22)
MONOCYTES NFR BLD AUTO: 14 % (ref 4–12)
NEUTROPHILS # BLD AUTO: 2.98 THOUSANDS/ΜL (ref 1.85–7.62)
NEUTS SEG NFR BLD AUTO: 62 % (ref 43–75)
NRBC BLD AUTO-RTO: 0 /100 WBCS
P AXIS: 63 DEGREES
PLATELET # BLD AUTO: 160 THOUSANDS/UL (ref 149–390)
PMV BLD AUTO: 11 FL (ref 8.9–12.7)
POTASSIUM SERPL-SCNC: 4 MMOL/L (ref 3.5–5.3)
PR INTERVAL: 146 MS
QRS AXIS: 56 DEGREES
QRSD INTERVAL: 88 MS
QT INTERVAL: 326 MS
QTC INTERVAL: 424 MS
RBC # BLD AUTO: 4.92 MILLION/UL (ref 3.88–5.62)
SODIUM SERPL-SCNC: 140 MMOL/L (ref 136–145)
T WAVE AXIS: 51 DEGREES
TROPONIN I SERPL-MCNC: <0.02 NG/ML
VENTRICULAR RATE: 102 BPM
WBC # BLD AUTO: 4.85 THOUSAND/UL (ref 4.31–10.16)

## 2020-11-12 PROCEDURE — 85025 COMPLETE CBC W/AUTO DIFF WBC: CPT | Performed by: EMERGENCY MEDICINE

## 2020-11-12 PROCEDURE — 93005 ELECTROCARDIOGRAM TRACING: CPT

## 2020-11-12 PROCEDURE — 71045 X-RAY EXAM CHEST 1 VIEW: CPT

## 2020-11-12 PROCEDURE — 99284 EMERGENCY DEPT VISIT MOD MDM: CPT | Performed by: EMERGENCY MEDICINE

## 2020-11-12 PROCEDURE — 93010 ELECTROCARDIOGRAM REPORT: CPT | Performed by: INTERNAL MEDICINE

## 2020-11-12 PROCEDURE — 84484 ASSAY OF TROPONIN QUANT: CPT | Performed by: EMERGENCY MEDICINE

## 2020-11-12 PROCEDURE — 80048 BASIC METABOLIC PNL TOTAL CA: CPT | Performed by: EMERGENCY MEDICINE

## 2020-11-12 PROCEDURE — 36415 COLL VENOUS BLD VENIPUNCTURE: CPT | Performed by: EMERGENCY MEDICINE

## 2020-11-12 PROCEDURE — 99285 EMERGENCY DEPT VISIT HI MDM: CPT

## 2020-11-12 PROCEDURE — 85379 FIBRIN DEGRADATION QUANT: CPT | Performed by: EMERGENCY MEDICINE

## 2020-11-15 ENCOUNTER — APPOINTMENT (EMERGENCY)
Dept: RADIOLOGY | Facility: HOSPITAL | Age: 45
DRG: 177 | End: 2020-11-15
Payer: COMMERCIAL

## 2020-11-15 ENCOUNTER — HOSPITAL ENCOUNTER (INPATIENT)
Facility: HOSPITAL | Age: 45
LOS: 4 days | Discharge: HOME | DRG: 177 | End: 2020-11-19
Attending: EMERGENCY MEDICINE | Admitting: INTERNAL MEDICINE
Payer: COMMERCIAL

## 2020-11-15 DIAGNOSIS — J18.9 PNEUMONIA: ICD-10-CM

## 2020-11-15 DIAGNOSIS — U07.1 COVID-19: Primary | ICD-10-CM

## 2020-11-15 PROBLEM — R79.89 ELEVATED LACTIC ACID LEVEL: Status: ACTIVE | Noted: 2020-11-15

## 2020-11-15 PROBLEM — R07.9 CHEST PAIN: Status: ACTIVE | Noted: 2020-11-15

## 2020-11-15 PROBLEM — J12.82 PNEUMONIA DUE TO COVID-19 VIRUS: Status: ACTIVE | Noted: 2020-11-15

## 2020-11-15 PROBLEM — R19.7 DIARRHEA: Status: ACTIVE | Noted: 2020-11-15

## 2020-11-15 LAB
ABO GROUP BLD: NORMAL
ABO GROUP BLD: NORMAL
ALBUMIN SERPL BCP-MCNC: 3.9 G/DL (ref 3.5–5)
ALP SERPL-CCNC: 79 U/L (ref 46–116)
ALT SERPL W P-5'-P-CCNC: 69 U/L (ref 12–78)
ANION GAP SERPL CALCULATED.3IONS-SCNC: 4 MMOL/L (ref 4–13)
AST SERPL W P-5'-P-CCNC: 33 U/L (ref 5–45)
ATRIAL RATE: 87 BPM
BASOPHILS # BLD AUTO: 0 THOUSANDS/ΜL (ref 0–0.1)
BASOPHILS NFR BLD AUTO: 0 % (ref 0–1)
BILIRUB SERPL-MCNC: 0.52 MG/DL (ref 0.2–1)
BLD GP AB SCN SERPL QL: NEGATIVE
BUN SERPL-MCNC: 8 MG/DL (ref 5–25)
CA-I BLD-SCNC: 1.1 MMOL/L (ref 1.12–1.32)
CALCIUM SERPL-MCNC: 8.4 MG/DL (ref 8.3–10.1)
CHLORIDE SERPL-SCNC: 104 MMOL/L (ref 100–108)
CK SERPL-CCNC: 140 U/L (ref 39–308)
CO2 SERPL-SCNC: 32 MMOL/L (ref 21–32)
CREAT SERPL-MCNC: 1.11 MG/DL (ref 0.6–1.3)
CRP SERPL QL: 26.3 MG/L
D DIMER PPP FEU-MCNC: 0.48 UG/ML FEU
EOSINOPHIL # BLD AUTO: 0 THOUSAND/ΜL (ref 0–0.61)
EOSINOPHIL NFR BLD AUTO: 0 % (ref 0–6)
ERYTHROCYTE [DISTWIDTH] IN BLOOD BY AUTOMATED COUNT: 12.6 % (ref 11.6–15.1)
FERRITIN SERPL-MCNC: 465 NG/ML (ref 8–388)
GFR SERPL CREATININE-BSD FRML MDRD: 81 ML/MIN/1.73SQ M
GLUCOSE SERPL-MCNC: 98 MG/DL (ref 65–140)
HCT VFR BLD AUTO: 44.1 % (ref 36.5–49.3)
HGB BLD-MCNC: 14.2 G/DL (ref 12–17)
IMM GRANULOCYTES # BLD AUTO: 0.02 THOUSAND/UL (ref 0–0.2)
IMM GRANULOCYTES NFR BLD AUTO: 0 % (ref 0–2)
INR PPP: 1.03 (ref 0.84–1.19)
L PNEUMO1 AG UR QL IA.RAPID: NEGATIVE
LACTATE SERPL-SCNC: 1.4 MMOL/L (ref 0.5–2)
LACTATE SERPL-SCNC: 2.4 MMOL/L (ref 0.5–2)
LYMPHOCYTES # BLD AUTO: 1.07 THOUSANDS/ΜL (ref 0.6–4.47)
LYMPHOCYTES NFR BLD AUTO: 20 % (ref 14–44)
MAGNESIUM SERPL-MCNC: 2 MG/DL (ref 1.6–2.6)
MCH RBC QN AUTO: 28.8 PG (ref 26.8–34.3)
MCHC RBC AUTO-ENTMCNC: 32.2 G/DL (ref 31.4–37.4)
MCV RBC AUTO: 90 FL (ref 82–98)
MONOCYTES # BLD AUTO: 0.51 THOUSAND/ΜL (ref 0.17–1.22)
MONOCYTES NFR BLD AUTO: 10 % (ref 4–12)
NEUTROPHILS # BLD AUTO: 3.74 THOUSANDS/ΜL (ref 1.85–7.62)
NEUTS SEG NFR BLD AUTO: 70 % (ref 43–75)
NRBC BLD AUTO-RTO: 0 /100 WBCS
NT-PROBNP SERPL-MCNC: 14 PG/ML
P AXIS: 50 DEGREES
PLATELET # BLD AUTO: 156 THOUSANDS/UL (ref 149–390)
PMV BLD AUTO: 11.4 FL (ref 8.9–12.7)
POTASSIUM SERPL-SCNC: 4 MMOL/L (ref 3.5–5.3)
PR INTERVAL: 162 MS
PROCALCITONIN SERPL-MCNC: <0.05 NG/ML
PROT SERPL-MCNC: 7.5 G/DL (ref 6.4–8.2)
PROTHROMBIN TIME: 13.5 SECONDS (ref 11.6–14.5)
QRS AXIS: 42 DEGREES
QRSD INTERVAL: 92 MS
QT INTERVAL: 360 MS
QTC INTERVAL: 433 MS
RBC # BLD AUTO: 4.93 MILLION/UL (ref 3.88–5.62)
RH BLD: NEGATIVE
RH BLD: NEGATIVE
S PNEUM AG UR QL: NEGATIVE
SODIUM SERPL-SCNC: 140 MMOL/L (ref 136–145)
SPECIMEN EXPIRATION DATE: NORMAL
T WAVE AXIS: 37 DEGREES
TRIGL SERPL-MCNC: 177 MG/DL
TROPONIN I SERPL-MCNC: <0.02 NG/ML
VENTRICULAR RATE: 87 BPM
WBC # BLD AUTO: 5.34 THOUSAND/UL (ref 4.31–10.16)

## 2020-11-15 PROCEDURE — 85025 COMPLETE CBC W/AUTO DIFF WBC: CPT | Performed by: EMERGENCY MEDICINE

## 2020-11-15 PROCEDURE — 85610 PROTHROMBIN TIME: CPT | Performed by: EMERGENCY MEDICINE

## 2020-11-15 PROCEDURE — XW13325 TRANSFUSION OF CONVALESCENT PLASMA (NONAUTOLOGOUS) INTO PERIPHERAL VEIN, PERCUTANEOUS APPROACH, NEW TECHNOLOGY GROUP 5: ICD-10-PCS | Performed by: INTERNAL MEDICINE

## 2020-11-15 PROCEDURE — 93005 ELECTROCARDIOGRAM TRACING: CPT

## 2020-11-15 PROCEDURE — 83605 ASSAY OF LACTIC ACID: CPT | Performed by: INTERNAL MEDICINE

## 2020-11-15 PROCEDURE — 83520 IMMUNOASSAY QUANT NOS NONAB: CPT | Performed by: EMERGENCY MEDICINE

## 2020-11-15 PROCEDURE — 83605 ASSAY OF LACTIC ACID: CPT | Performed by: EMERGENCY MEDICINE

## 2020-11-15 PROCEDURE — 86901 BLOOD TYPING SEROLOGIC RH(D): CPT | Performed by: INTERNAL MEDICINE

## 2020-11-15 PROCEDURE — 83880 ASSAY OF NATRIURETIC PEPTIDE: CPT | Performed by: EMERGENCY MEDICINE

## 2020-11-15 PROCEDURE — 86900 BLOOD TYPING SEROLOGIC ABO: CPT | Performed by: INTERNAL MEDICINE

## 2020-11-15 PROCEDURE — 36415 COLL VENOUS BLD VENIPUNCTURE: CPT | Performed by: EMERGENCY MEDICINE

## 2020-11-15 PROCEDURE — 96374 THER/PROPH/DIAG INJ IV PUSH: CPT

## 2020-11-15 PROCEDURE — 84478 ASSAY OF TRIGLYCERIDES: CPT | Performed by: EMERGENCY MEDICINE

## 2020-11-15 PROCEDURE — 94760 N-INVAS EAR/PLS OXIMETRY 1: CPT

## 2020-11-15 PROCEDURE — 82330 ASSAY OF CALCIUM: CPT | Performed by: EMERGENCY MEDICINE

## 2020-11-15 PROCEDURE — 80053 COMPREHEN METABOLIC PANEL: CPT | Performed by: EMERGENCY MEDICINE

## 2020-11-15 PROCEDURE — 84484 ASSAY OF TROPONIN QUANT: CPT | Performed by: INTERNAL MEDICINE

## 2020-11-15 PROCEDURE — 82955 ASSAY OF G6PD ENZYME: CPT | Performed by: EMERGENCY MEDICINE

## 2020-11-15 PROCEDURE — 87040 BLOOD CULTURE FOR BACTERIA: CPT | Performed by: EMERGENCY MEDICINE

## 2020-11-15 PROCEDURE — 93010 ELECTROCARDIOGRAM REPORT: CPT | Performed by: INTERNAL MEDICINE

## 2020-11-15 PROCEDURE — 82728 ASSAY OF FERRITIN: CPT | Performed by: EMERGENCY MEDICINE

## 2020-11-15 PROCEDURE — 99223 1ST HOSP IP/OBS HIGH 75: CPT | Performed by: INTERNAL MEDICINE

## 2020-11-15 PROCEDURE — 87449 NOS EACH ORGANISM AG IA: CPT | Performed by: INTERNAL MEDICINE

## 2020-11-15 PROCEDURE — 86850 RBC ANTIBODY SCREEN: CPT | Performed by: INTERNAL MEDICINE

## 2020-11-15 PROCEDURE — 83735 ASSAY OF MAGNESIUM: CPT | Performed by: EMERGENCY MEDICINE

## 2020-11-15 PROCEDURE — 85379 FIBRIN DEGRADATION QUANT: CPT | Performed by: EMERGENCY MEDICINE

## 2020-11-15 PROCEDURE — 99285 EMERGENCY DEPT VISIT HI MDM: CPT

## 2020-11-15 PROCEDURE — 71045 X-RAY EXAM CHEST 1 VIEW: CPT

## 2020-11-15 PROCEDURE — 84145 PROCALCITONIN (PCT): CPT | Performed by: EMERGENCY MEDICINE

## 2020-11-15 PROCEDURE — 84484 ASSAY OF TROPONIN QUANT: CPT | Performed by: EMERGENCY MEDICINE

## 2020-11-15 PROCEDURE — 86140 C-REACTIVE PROTEIN: CPT | Performed by: EMERGENCY MEDICINE

## 2020-11-15 PROCEDURE — 99285 EMERGENCY DEPT VISIT HI MDM: CPT | Performed by: EMERGENCY MEDICINE

## 2020-11-15 PROCEDURE — 82550 ASSAY OF CK (CPK): CPT | Performed by: EMERGENCY MEDICINE

## 2020-11-15 RX ORDER — ZINC SULFATE 50(220)MG
220 CAPSULE ORAL DAILY
Status: DISCONTINUED | OUTPATIENT
Start: 2020-11-15 | End: 2020-11-19 | Stop reason: HOSPADM

## 2020-11-15 RX ORDER — ASCORBIC ACID 500 MG
1000 TABLET ORAL EVERY 12 HOURS SCHEDULED
Status: DISCONTINUED | OUTPATIENT
Start: 2020-11-15 | End: 2020-11-19 | Stop reason: HOSPADM

## 2020-11-15 RX ORDER — DEXAMETHASONE SODIUM PHOSPHATE 10 MG/ML
10 INJECTION, SOLUTION INTRAMUSCULAR; INTRAVENOUS ONCE
Status: COMPLETED | OUTPATIENT
Start: 2020-11-15 | End: 2020-11-15

## 2020-11-15 RX ORDER — DOXYCYCLINE HYCLATE 100 MG/1
100 CAPSULE ORAL ONCE
Status: COMPLETED | OUTPATIENT
Start: 2020-11-15 | End: 2020-11-15

## 2020-11-15 RX ORDER — DEXAMETHASONE SODIUM PHOSPHATE 4 MG/ML
6 INJECTION, SOLUTION INTRA-ARTICULAR; INTRALESIONAL; INTRAMUSCULAR; INTRAVENOUS; SOFT TISSUE EVERY 24 HOURS
Status: DISCONTINUED | OUTPATIENT
Start: 2020-11-16 | End: 2020-11-19 | Stop reason: HOSPADM

## 2020-11-15 RX ORDER — MULTIVITAMIN/IRON/FOLIC ACID 18MG-0.4MG
1 TABLET ORAL DAILY
Status: DISCONTINUED | OUTPATIENT
Start: 2020-11-22 | End: 2020-11-19 | Stop reason: HOSPADM

## 2020-11-15 RX ORDER — SODIUM CHLORIDE, SODIUM GLUCONATE, SODIUM ACETATE, POTASSIUM CHLORIDE, MAGNESIUM CHLORIDE, SODIUM PHOSPHATE, DIBASIC, AND POTASSIUM PHOSPHATE .53; .5; .37; .037; .03; .012; .00082 G/100ML; G/100ML; G/100ML; G/100ML; G/100ML; G/100ML; G/100ML
100 INJECTION, SOLUTION INTRAVENOUS CONTINUOUS
Status: DISCONTINUED | OUTPATIENT
Start: 2020-11-15 | End: 2020-11-19 | Stop reason: HOSPADM

## 2020-11-15 RX ORDER — ALBUTEROL SULFATE 90 UG/1
2 AEROSOL, METERED RESPIRATORY (INHALATION) EVERY 4 HOURS PRN
Status: DISCONTINUED | OUTPATIENT
Start: 2020-11-15 | End: 2020-11-19 | Stop reason: HOSPADM

## 2020-11-15 RX ORDER — MELATONIN
2000 DAILY
Status: DISCONTINUED | OUTPATIENT
Start: 2020-11-15 | End: 2020-11-19 | Stop reason: HOSPADM

## 2020-11-15 RX ORDER — LEVOTHYROXINE SODIUM 112 UG/1
112 TABLET ORAL
Status: DISCONTINUED | OUTPATIENT
Start: 2020-11-16 | End: 2020-11-19 | Stop reason: HOSPADM

## 2020-11-15 RX ORDER — ACETAMINOPHEN 325 MG/1
650 TABLET ORAL EVERY 6 HOURS PRN
Status: DISCONTINUED | OUTPATIENT
Start: 2020-11-15 | End: 2020-11-19 | Stop reason: HOSPADM

## 2020-11-15 RX ORDER — DOXYCYCLINE HYCLATE 100 MG/1
100 CAPSULE ORAL EVERY 12 HOURS
Status: DISCONTINUED | OUTPATIENT
Start: 2020-11-15 | End: 2020-11-16

## 2020-11-15 RX ADMIN — DOXYCYCLINE 100 MG: 100 CAPSULE ORAL at 11:45

## 2020-11-15 RX ADMIN — ENOXAPARIN SODIUM 30 MG: 30 INJECTION SUBCUTANEOUS at 14:11

## 2020-11-15 RX ADMIN — OXYCODONE HYDROCHLORIDE AND ACETAMINOPHEN 1000 MG: 500 TABLET ORAL at 14:09

## 2020-11-15 RX ADMIN — ZINC SULFATE 220 MG (50 MG) CAPSULE 220 MG: CAPSULE at 14:10

## 2020-11-15 RX ADMIN — DEXAMETHASONE SODIUM PHOSPHATE 10 MG: 10 INJECTION, SOLUTION INTRAMUSCULAR; INTRAVENOUS at 09:51

## 2020-11-15 RX ADMIN — CEFTRIAXONE SODIUM 1000 MG: 10 INJECTION, POWDER, FOR SOLUTION INTRAVENOUS at 11:45

## 2020-11-15 RX ADMIN — ENOXAPARIN SODIUM 30 MG: 30 INJECTION SUBCUTANEOUS at 20:44

## 2020-11-15 RX ADMIN — ACETAMINOPHEN 650 MG: 325 TABLET, FILM COATED ORAL at 17:34

## 2020-11-15 RX ADMIN — REMDESIVIR 200 MG: 100 INJECTION, POWDER, LYOPHILIZED, FOR SOLUTION INTRAVENOUS at 15:19

## 2020-11-15 RX ADMIN — SODIUM CHLORIDE 1000 ML: 0.9 INJECTION, SOLUTION INTRAVENOUS at 11:32

## 2020-11-15 RX ADMIN — OXYCODONE HYDROCHLORIDE AND ACETAMINOPHEN 1000 MG: 500 TABLET ORAL at 20:44

## 2020-11-15 RX ADMIN — SODIUM CHLORIDE, SODIUM GLUCONATE, SODIUM ACETATE, POTASSIUM CHLORIDE, MAGNESIUM CHLORIDE, SODIUM PHOSPHATE, DIBASIC, AND POTASSIUM PHOSPHATE 100 ML/HR: .53; .5; .37; .037; .03; .012; .00082 INJECTION, SOLUTION INTRAVENOUS at 14:17

## 2020-11-15 RX ADMIN — DOXYCYCLINE 100 MG: 100 CAPSULE ORAL at 20:44

## 2020-11-15 RX ADMIN — Medication 2000 UNITS: at 14:10

## 2020-11-16 LAB
ABO GROUP BLD BPU: NORMAL
ALBUMIN SERPL BCP-MCNC: 3.6 G/DL (ref 3.5–5)
ALP SERPL-CCNC: 72 U/L (ref 46–116)
ALT SERPL W P-5'-P-CCNC: 61 U/L (ref 12–78)
ANION GAP SERPL CALCULATED.3IONS-SCNC: 10 MMOL/L (ref 4–13)
AST SERPL W P-5'-P-CCNC: 32 U/L (ref 5–45)
BASOPHILS # BLD AUTO: 0 THOUSANDS/ΜL (ref 0–0.1)
BASOPHILS NFR BLD AUTO: 0 % (ref 0–1)
BILIRUB SERPL-MCNC: 0.33 MG/DL (ref 0.2–1)
BPU ID: NORMAL
BUN SERPL-MCNC: 12 MG/DL (ref 5–25)
CALCIUM SERPL-MCNC: 8.5 MG/DL (ref 8.3–10.1)
CHLORIDE SERPL-SCNC: 108 MMOL/L (ref 100–108)
CO2 SERPL-SCNC: 25 MMOL/L (ref 21–32)
CREAT SERPL-MCNC: 0.95 MG/DL (ref 0.6–1.3)
CRP SERPL QL: 26.1 MG/L
D DIMER PPP FEU-MCNC: 0.39 UG/ML FEU
EOSINOPHIL # BLD AUTO: 0 THOUSAND/ΜL (ref 0–0.61)
EOSINOPHIL NFR BLD AUTO: 0 % (ref 0–6)
ERYTHROCYTE [DISTWIDTH] IN BLOOD BY AUTOMATED COUNT: 12.4 % (ref 11.6–15.1)
FERRITIN SERPL-MCNC: 444 NG/ML (ref 8–388)
GFR SERPL CREATININE-BSD FRML MDRD: 98 ML/MIN/1.73SQ M
GLUCOSE SERPL-MCNC: 124 MG/DL (ref 65–140)
HCT VFR BLD AUTO: 40.2 % (ref 36.5–49.3)
HGB BLD-MCNC: 13.6 G/DL (ref 12–17)
IMM GRANULOCYTES # BLD AUTO: 0.01 THOUSAND/UL (ref 0–0.2)
IMM GRANULOCYTES NFR BLD AUTO: 0 % (ref 0–2)
INR PPP: 1.05 (ref 0.84–1.19)
LYMPHOCYTES # BLD AUTO: 0.92 THOUSANDS/ΜL (ref 0.6–4.47)
LYMPHOCYTES NFR BLD AUTO: 27 % (ref 14–44)
MCH RBC QN AUTO: 29.4 PG (ref 26.8–34.3)
MCHC RBC AUTO-ENTMCNC: 33.8 G/DL (ref 31.4–37.4)
MCV RBC AUTO: 87 FL (ref 82–98)
MONOCYTES # BLD AUTO: 0.42 THOUSAND/ΜL (ref 0.17–1.22)
MONOCYTES NFR BLD AUTO: 13 % (ref 4–12)
NEUTROPHILS # BLD AUTO: 2.01 THOUSANDS/ΜL (ref 1.85–7.62)
NEUTS SEG NFR BLD AUTO: 60 % (ref 43–75)
NRBC BLD AUTO-RTO: 0 /100 WBCS
PLATELET # BLD AUTO: 175 THOUSANDS/UL (ref 149–390)
PMV BLD AUTO: 11 FL (ref 8.9–12.7)
POTASSIUM SERPL-SCNC: 3.8 MMOL/L (ref 3.5–5.3)
PROCALCITONIN SERPL-MCNC: <0.05 NG/ML
PROT SERPL-MCNC: 7 G/DL (ref 6.4–8.2)
PROTHROMBIN TIME: 13.7 SECONDS (ref 11.6–14.5)
RBC # BLD AUTO: 4.62 MILLION/UL (ref 3.88–5.62)
SODIUM SERPL-SCNC: 143 MMOL/L (ref 136–145)
UNIT DISPENSE STATUS: NORMAL
UNIT PRODUCT CODE: NORMAL
UNIT RH: NORMAL
WBC # BLD AUTO: 3.36 THOUSAND/UL (ref 4.31–10.16)

## 2020-11-16 PROCEDURE — 99232 SBSQ HOSP IP/OBS MODERATE 35: CPT | Performed by: PHYSICIAN ASSISTANT

## 2020-11-16 PROCEDURE — 86140 C-REACTIVE PROTEIN: CPT | Performed by: INTERNAL MEDICINE

## 2020-11-16 PROCEDURE — 84145 PROCALCITONIN (PCT): CPT | Performed by: INTERNAL MEDICINE

## 2020-11-16 PROCEDURE — 94760 N-INVAS EAR/PLS OXIMETRY 1: CPT

## 2020-11-16 PROCEDURE — 85379 FIBRIN DEGRADATION QUANT: CPT | Performed by: INTERNAL MEDICINE

## 2020-11-16 PROCEDURE — 85025 COMPLETE CBC W/AUTO DIFF WBC: CPT | Performed by: INTERNAL MEDICINE

## 2020-11-16 PROCEDURE — 80053 COMPREHEN METABOLIC PANEL: CPT | Performed by: INTERNAL MEDICINE

## 2020-11-16 PROCEDURE — 82728 ASSAY OF FERRITIN: CPT | Performed by: INTERNAL MEDICINE

## 2020-11-16 PROCEDURE — 85610 PROTHROMBIN TIME: CPT | Performed by: INTERNAL MEDICINE

## 2020-11-16 RX ORDER — GUAIFENESIN/DEXTROMETHORPHAN 100-10MG/5
10 SYRUP ORAL EVERY 4 HOURS PRN
Status: DISCONTINUED | OUTPATIENT
Start: 2020-11-16 | End: 2020-11-19 | Stop reason: HOSPADM

## 2020-11-16 RX ADMIN — DEXAMETHASONE SODIUM PHOSPHATE 6 MG: 4 INJECTION, SOLUTION INTRAMUSCULAR; INTRAVENOUS at 09:21

## 2020-11-16 RX ADMIN — ACETAMINOPHEN 650 MG: 325 TABLET, FILM COATED ORAL at 11:52

## 2020-11-16 RX ADMIN — ZINC SULFATE 220 MG (50 MG) CAPSULE 220 MG: CAPSULE at 09:21

## 2020-11-16 RX ADMIN — SODIUM CHLORIDE, SODIUM GLUCONATE, SODIUM ACETATE, POTASSIUM CHLORIDE, MAGNESIUM CHLORIDE, SODIUM PHOSPHATE, DIBASIC, AND POTASSIUM PHOSPHATE 100 ML/HR: .53; .5; .37; .037; .03; .012; .00082 INJECTION, SOLUTION INTRAVENOUS at 17:37

## 2020-11-16 RX ADMIN — REMDESIVIR 100 MG: 100 INJECTION, POWDER, LYOPHILIZED, FOR SOLUTION INTRAVENOUS at 13:01

## 2020-11-16 RX ADMIN — OXYCODONE HYDROCHLORIDE AND ACETAMINOPHEN 1000 MG: 500 TABLET ORAL at 20:37

## 2020-11-16 RX ADMIN — ENOXAPARIN SODIUM 30 MG: 30 INJECTION SUBCUTANEOUS at 20:37

## 2020-11-16 RX ADMIN — Medication 2000 UNITS: at 09:21

## 2020-11-16 RX ADMIN — OXYCODONE HYDROCHLORIDE AND ACETAMINOPHEN 1000 MG: 500 TABLET ORAL at 09:21

## 2020-11-16 RX ADMIN — SODIUM CHLORIDE, SODIUM GLUCONATE, SODIUM ACETATE, POTASSIUM CHLORIDE, MAGNESIUM CHLORIDE, SODIUM PHOSPHATE, DIBASIC, AND POTASSIUM PHOSPHATE 100 ML/HR: .53; .5; .37; .037; .03; .012; .00082 INJECTION, SOLUTION INTRAVENOUS at 05:14

## 2020-11-16 RX ADMIN — LEVOTHYROXINE SODIUM 112 MCG: 112 TABLET ORAL at 05:15

## 2020-11-16 RX ADMIN — ENOXAPARIN SODIUM 30 MG: 30 INJECTION SUBCUTANEOUS at 09:23

## 2020-11-16 RX ADMIN — GUAIFENESIN AND DEXTROMETHORPHAN 10 ML: 100; 10 SYRUP ORAL at 22:50

## 2020-11-16 RX ADMIN — DOXYCYCLINE 100 MG: 100 CAPSULE ORAL at 09:21

## 2020-11-16 RX ADMIN — CEFTRIAXONE SODIUM 1000 MG: 10 INJECTION, POWDER, FOR SOLUTION INTRAVENOUS at 11:52

## 2020-11-17 LAB
ALBUMIN SERPL BCP-MCNC: 3.4 G/DL (ref 3.5–5)
ALP SERPL-CCNC: 63 U/L (ref 46–116)
ALT SERPL W P-5'-P-CCNC: 58 U/L (ref 12–78)
ANION GAP SERPL CALCULATED.3IONS-SCNC: 5 MMOL/L (ref 4–13)
AST SERPL W P-5'-P-CCNC: 33 U/L (ref 5–45)
BILIRUB SERPL-MCNC: 0.41 MG/DL (ref 0.2–1)
BUN SERPL-MCNC: 12 MG/DL (ref 5–25)
CALCIUM ALBUM COR SERPL-MCNC: 8.9 MG/DL (ref 8.3–10.1)
CALCIUM SERPL-MCNC: 8.4 MG/DL (ref 8.3–10.1)
CHLORIDE SERPL-SCNC: 107 MMOL/L (ref 100–108)
CO2 SERPL-SCNC: 30 MMOL/L (ref 21–32)
CREAT SERPL-MCNC: 0.93 MG/DL (ref 0.6–1.3)
CRP SERPL QL: 12.3 MG/L
D DIMER PPP FEU-MCNC: 0.33 UG/ML FEU
ERYTHROCYTE [DISTWIDTH] IN BLOOD BY AUTOMATED COUNT: 12.5 % (ref 11.6–15.1)
FERRITIN SERPL-MCNC: 422 NG/ML (ref 8–388)
G6PD BLD QN: 209 U/10E12 RBC (ref 127–427)
GFR SERPL CREATININE-BSD FRML MDRD: 100 ML/MIN/1.73SQ M
GLUCOSE SERPL-MCNC: 94 MG/DL (ref 65–140)
HCT VFR BLD AUTO: 39.1 % (ref 36.5–49.3)
HGB BLD-MCNC: 13.4 G/DL (ref 12–17)
MCH RBC QN AUTO: 29.6 PG (ref 26.8–34.3)
MCHC RBC AUTO-ENTMCNC: 34.3 G/DL (ref 31.4–37.4)
MCV RBC AUTO: 87 FL (ref 82–98)
PLATELET # BLD AUTO: 171 THOUSANDS/UL (ref 149–390)
PMV BLD AUTO: 10.9 FL (ref 8.9–12.7)
POTASSIUM SERPL-SCNC: 4 MMOL/L (ref 3.5–5.3)
PROT SERPL-MCNC: 6.7 G/DL (ref 6.4–8.2)
RBC # BLD AUTO: 4.52 MILLION/UL (ref 3.88–5.62)
RBC # BLD AUTO: 4.72 X10E6/UL (ref 4.14–5.8)
SODIUM SERPL-SCNC: 142 MMOL/L (ref 136–145)
WBC # BLD AUTO: 6.56 THOUSAND/UL (ref 4.31–10.16)

## 2020-11-17 PROCEDURE — 80053 COMPREHEN METABOLIC PANEL: CPT | Performed by: PHYSICIAN ASSISTANT

## 2020-11-17 PROCEDURE — 82728 ASSAY OF FERRITIN: CPT | Performed by: PHYSICIAN ASSISTANT

## 2020-11-17 PROCEDURE — 94760 N-INVAS EAR/PLS OXIMETRY 1: CPT

## 2020-11-17 PROCEDURE — 86140 C-REACTIVE PROTEIN: CPT | Performed by: PHYSICIAN ASSISTANT

## 2020-11-17 PROCEDURE — 85027 COMPLETE CBC AUTOMATED: CPT | Performed by: PHYSICIAN ASSISTANT

## 2020-11-17 PROCEDURE — 85379 FIBRIN DEGRADATION QUANT: CPT | Performed by: PHYSICIAN ASSISTANT

## 2020-11-17 PROCEDURE — 99232 SBSQ HOSP IP/OBS MODERATE 35: CPT | Performed by: INTERNAL MEDICINE

## 2020-11-17 RX ORDER — ONDANSETRON 2 MG/ML
4 INJECTION INTRAMUSCULAR; INTRAVENOUS EVERY 4 HOURS PRN
Status: DISCONTINUED | OUTPATIENT
Start: 2020-11-17 | End: 2020-11-19 | Stop reason: HOSPADM

## 2020-11-17 RX ORDER — LANOLIN ALCOHOL/MO/W.PET/CERES
3 CREAM (GRAM) TOPICAL
Status: DISCONTINUED | OUTPATIENT
Start: 2020-11-17 | End: 2020-11-19 | Stop reason: HOSPADM

## 2020-11-17 RX ADMIN — OXYCODONE HYDROCHLORIDE AND ACETAMINOPHEN 1000 MG: 500 TABLET ORAL at 11:35

## 2020-11-17 RX ADMIN — MELATONIN 3 MG: at 21:07

## 2020-11-17 RX ADMIN — Medication 2000 UNITS: at 11:35

## 2020-11-17 RX ADMIN — SODIUM CHLORIDE, SODIUM GLUCONATE, SODIUM ACETATE, POTASSIUM CHLORIDE, MAGNESIUM CHLORIDE, SODIUM PHOSPHATE, DIBASIC, AND POTASSIUM PHOSPHATE 100 ML/HR: .53; .5; .37; .037; .03; .012; .00082 INJECTION, SOLUTION INTRAVENOUS at 13:11

## 2020-11-17 RX ADMIN — REMDESIVIR 100 MG: 100 INJECTION, POWDER, LYOPHILIZED, FOR SOLUTION INTRAVENOUS at 12:41

## 2020-11-17 RX ADMIN — OXYCODONE HYDROCHLORIDE AND ACETAMINOPHEN 1000 MG: 500 TABLET ORAL at 21:07

## 2020-11-17 RX ADMIN — SODIUM CHLORIDE, SODIUM GLUCONATE, SODIUM ACETATE, POTASSIUM CHLORIDE, MAGNESIUM CHLORIDE, SODIUM PHOSPHATE, DIBASIC, AND POTASSIUM PHOSPHATE 100 ML/HR: .53; .5; .37; .037; .03; .012; .00082 INJECTION, SOLUTION INTRAVENOUS at 03:23

## 2020-11-17 RX ADMIN — ZINC SULFATE 220 MG (50 MG) CAPSULE 220 MG: CAPSULE at 11:36

## 2020-11-17 RX ADMIN — ENOXAPARIN SODIUM 30 MG: 30 INJECTION SUBCUTANEOUS at 11:35

## 2020-11-17 RX ADMIN — ENOXAPARIN SODIUM 30 MG: 30 INJECTION SUBCUTANEOUS at 21:07

## 2020-11-17 RX ADMIN — LEVOTHYROXINE SODIUM 112 MCG: 112 TABLET ORAL at 05:46

## 2020-11-17 RX ADMIN — GUAIFENESIN AND DEXTROMETHORPHAN 10 ML: 100; 10 SYRUP ORAL at 03:30

## 2020-11-17 RX ADMIN — DEXAMETHASONE SODIUM PHOSPHATE 6 MG: 4 INJECTION, SOLUTION INTRAMUSCULAR; INTRAVENOUS at 09:25

## 2020-11-17 RX ADMIN — ACETAMINOPHEN 650 MG: 325 TABLET, FILM COATED ORAL at 09:26

## 2020-11-17 RX ADMIN — ONDANSETRON 4 MG: 2 INJECTION INTRAMUSCULAR; INTRAVENOUS at 09:26

## 2020-11-18 ENCOUNTER — APPOINTMENT (INPATIENT)
Dept: RADIOLOGY | Facility: HOSPITAL | Age: 45
DRG: 177 | End: 2020-11-18
Attending: INTERNAL MEDICINE
Payer: COMMERCIAL

## 2020-11-18 LAB — IL6 SERPL-MCNC: 13.2 PG/ML (ref 0–13)

## 2020-11-18 PROCEDURE — 71045 X-RAY EXAM CHEST 1 VIEW: CPT

## 2020-11-18 PROCEDURE — 99232 SBSQ HOSP IP/OBS MODERATE 35: CPT | Performed by: INTERNAL MEDICINE

## 2020-11-18 RX ADMIN — LEVOTHYROXINE SODIUM 112 MCG: 112 TABLET ORAL at 05:20

## 2020-11-18 RX ADMIN — DEXAMETHASONE SODIUM PHOSPHATE 6 MG: 4 INJECTION, SOLUTION INTRAMUSCULAR; INTRAVENOUS at 09:23

## 2020-11-18 RX ADMIN — MELATONIN 3 MG: at 21:27

## 2020-11-18 RX ADMIN — Medication 2000 UNITS: at 09:21

## 2020-11-18 RX ADMIN — OXYCODONE HYDROCHLORIDE AND ACETAMINOPHEN 1000 MG: 500 TABLET ORAL at 21:27

## 2020-11-18 RX ADMIN — OXYCODONE HYDROCHLORIDE AND ACETAMINOPHEN 1000 MG: 500 TABLET ORAL at 09:21

## 2020-11-18 RX ADMIN — SODIUM CHLORIDE, SODIUM GLUCONATE, SODIUM ACETATE, POTASSIUM CHLORIDE, MAGNESIUM CHLORIDE, SODIUM PHOSPHATE, DIBASIC, AND POTASSIUM PHOSPHATE 100 ML/HR: .53; .5; .37; .037; .03; .012; .00082 INJECTION, SOLUTION INTRAVENOUS at 01:12

## 2020-11-18 RX ADMIN — SODIUM CHLORIDE, SODIUM GLUCONATE, SODIUM ACETATE, POTASSIUM CHLORIDE, MAGNESIUM CHLORIDE, SODIUM PHOSPHATE, DIBASIC, AND POTASSIUM PHOSPHATE 100 ML/HR: .53; .5; .37; .037; .03; .012; .00082 INJECTION, SOLUTION INTRAVENOUS at 12:07

## 2020-11-18 RX ADMIN — REMDESIVIR 100 MG: 100 INJECTION, POWDER, LYOPHILIZED, FOR SOLUTION INTRAVENOUS at 14:10

## 2020-11-18 RX ADMIN — ZINC SULFATE 220 MG (50 MG) CAPSULE 220 MG: CAPSULE at 09:21

## 2020-11-18 RX ADMIN — ENOXAPARIN SODIUM 30 MG: 30 INJECTION SUBCUTANEOUS at 09:27

## 2020-11-18 RX ADMIN — ENOXAPARIN SODIUM 30 MG: 30 INJECTION SUBCUTANEOUS at 21:27

## 2020-11-19 VITALS
HEART RATE: 63 BPM | BODY MASS INDEX: 38.83 KG/M2 | OXYGEN SATURATION: 96 % | WEIGHT: 293 LBS | TEMPERATURE: 98.6 F | RESPIRATION RATE: 19 BRPM | HEIGHT: 73 IN | SYSTOLIC BLOOD PRESSURE: 135 MMHG | DIASTOLIC BLOOD PRESSURE: 85 MMHG

## 2020-11-19 LAB
ANION GAP SERPL CALCULATED.3IONS-SCNC: 6 MMOL/L (ref 4–13)
BASOPHILS # BLD AUTO: 0.01 THOUSANDS/ΜL (ref 0–0.1)
BASOPHILS NFR BLD AUTO: 0 % (ref 0–1)
BUN SERPL-MCNC: 11 MG/DL (ref 5–25)
CALCIUM SERPL-MCNC: 8.5 MG/DL (ref 8.3–10.1)
CHLORIDE SERPL-SCNC: 109 MMOL/L (ref 100–108)
CO2 SERPL-SCNC: 28 MMOL/L (ref 21–32)
CREAT SERPL-MCNC: 0.86 MG/DL (ref 0.6–1.3)
CRP SERPL QL: 9.8 MG/L
D DIMER PPP FEU-MCNC: 0.42 UG/ML FEU
EOSINOPHIL # BLD AUTO: 0.01 THOUSAND/ΜL (ref 0–0.61)
EOSINOPHIL NFR BLD AUTO: 0 % (ref 0–6)
ERYTHROCYTE [DISTWIDTH] IN BLOOD BY AUTOMATED COUNT: 12.3 % (ref 11.6–15.1)
FERRITIN SERPL-MCNC: 480 NG/ML (ref 8–388)
GFR SERPL CREATININE-BSD FRML MDRD: 106 ML/MIN/1.73SQ M
GLUCOSE SERPL-MCNC: 93 MG/DL (ref 65–140)
HCT VFR BLD AUTO: 39.7 % (ref 36.5–49.3)
HGB BLD-MCNC: 13.6 G/DL (ref 12–17)
IMM GRANULOCYTES # BLD AUTO: 0.12 THOUSAND/UL (ref 0–0.2)
IMM GRANULOCYTES NFR BLD AUTO: 2 % (ref 0–2)
LYMPHOCYTES # BLD AUTO: 2.08 THOUSANDS/ΜL (ref 0.6–4.47)
LYMPHOCYTES NFR BLD AUTO: 29 % (ref 14–44)
MCH RBC QN AUTO: 29.6 PG (ref 26.8–34.3)
MCHC RBC AUTO-ENTMCNC: 34.3 G/DL (ref 31.4–37.4)
MCV RBC AUTO: 87 FL (ref 82–98)
MONOCYTES # BLD AUTO: 0.62 THOUSAND/ΜL (ref 0.17–1.22)
MONOCYTES NFR BLD AUTO: 9 % (ref 4–12)
NEUTROPHILS # BLD AUTO: 4.4 THOUSANDS/ΜL (ref 1.85–7.62)
NEUTS SEG NFR BLD AUTO: 60 % (ref 43–75)
NRBC BLD AUTO-RTO: 0 /100 WBCS
PLATELET # BLD AUTO: 221 THOUSANDS/UL (ref 149–390)
PMV BLD AUTO: 10.3 FL (ref 8.9–12.7)
POTASSIUM SERPL-SCNC: 3.6 MMOL/L (ref 3.5–5.3)
RBC # BLD AUTO: 4.59 MILLION/UL (ref 3.88–5.62)
SODIUM SERPL-SCNC: 143 MMOL/L (ref 136–145)
WBC # BLD AUTO: 7.24 THOUSAND/UL (ref 4.31–10.16)

## 2020-11-19 PROCEDURE — 80048 BASIC METABOLIC PNL TOTAL CA: CPT | Performed by: INTERNAL MEDICINE

## 2020-11-19 PROCEDURE — 85379 FIBRIN DEGRADATION QUANT: CPT | Performed by: INTERNAL MEDICINE

## 2020-11-19 PROCEDURE — 86140 C-REACTIVE PROTEIN: CPT | Performed by: INTERNAL MEDICINE

## 2020-11-19 PROCEDURE — 82728 ASSAY OF FERRITIN: CPT | Performed by: INTERNAL MEDICINE

## 2020-11-19 PROCEDURE — 99238 HOSP IP/OBS DSCHRG MGMT 30/<: CPT | Performed by: INTERNAL MEDICINE

## 2020-11-19 PROCEDURE — 85025 COMPLETE CBC W/AUTO DIFF WBC: CPT | Performed by: INTERNAL MEDICINE

## 2020-11-19 RX ORDER — GUAIFENESIN/DEXTROMETHORPHAN 100-10MG/5
10 SYRUP ORAL EVERY 4 HOURS PRN
Refills: 0
Start: 2020-11-19 | End: 2021-03-19 | Stop reason: ALTCHOICE

## 2020-11-19 RX ORDER — LANOLIN ALCOHOL/MO/W.PET/CERES
3 CREAM (GRAM) TOPICAL
Qty: 30 TABLET | Refills: 0 | Status: SHIPPED | OUTPATIENT
Start: 2020-11-19 | End: 2021-03-19 | Stop reason: ALTCHOICE

## 2020-11-19 RX ORDER — PREDNISONE 10 MG/1
40 TABLET ORAL DAILY
Qty: 26 TABLET | Refills: 0 | Status: SHIPPED | OUTPATIENT
Start: 2020-11-19 | End: 2021-03-19 | Stop reason: ALTCHOICE

## 2020-11-19 RX ORDER — ZINC SULFATE 50(220)MG
220 CAPSULE ORAL DAILY
Qty: 2 CAPSULE | Refills: 0 | Status: SHIPPED | OUTPATIENT
Start: 2020-11-20 | End: 2021-03-19 | Stop reason: ALTCHOICE

## 2020-11-19 RX ORDER — MELATONIN
2000 DAILY
Qty: 4 TABLET | Refills: 0 | Status: SHIPPED | OUTPATIENT
Start: 2020-11-20 | End: 2021-03-19 | Stop reason: ALTCHOICE

## 2020-11-19 RX ORDER — ALBUTEROL SULFATE 90 UG/1
2 AEROSOL, METERED RESPIRATORY (INHALATION) EVERY 4 HOURS PRN
Qty: 1 INHALER | Refills: 0 | Status: SHIPPED | OUTPATIENT
Start: 2020-11-19 | End: 2021-03-19 | Stop reason: ALTCHOICE

## 2020-11-19 RX ORDER — ACETAMINOPHEN 325 MG/1
650 TABLET ORAL EVERY 6 HOURS PRN
Qty: 30 TABLET | Refills: 0
Start: 2020-11-19 | End: 2021-03-19 | Stop reason: ALTCHOICE

## 2020-11-19 RX ADMIN — ENOXAPARIN SODIUM 30 MG: 30 INJECTION SUBCUTANEOUS at 08:21

## 2020-11-19 RX ADMIN — Medication 2000 UNITS: at 08:21

## 2020-11-19 RX ADMIN — DEXAMETHASONE SODIUM PHOSPHATE 6 MG: 4 INJECTION, SOLUTION INTRAMUSCULAR; INTRAVENOUS at 08:21

## 2020-11-19 RX ADMIN — ZINC SULFATE 220 MG (50 MG) CAPSULE 220 MG: CAPSULE at 08:21

## 2020-11-19 RX ADMIN — OXYCODONE HYDROCHLORIDE AND ACETAMINOPHEN 1000 MG: 500 TABLET ORAL at 08:21

## 2020-11-19 RX ADMIN — REMDESIVIR 100 MG: 100 INJECTION, POWDER, LYOPHILIZED, FOR SOLUTION INTRAVENOUS at 13:21

## 2020-11-19 RX ADMIN — SODIUM CHLORIDE, SODIUM GLUCONATE, SODIUM ACETATE, POTASSIUM CHLORIDE, MAGNESIUM CHLORIDE, SODIUM PHOSPHATE, DIBASIC, AND POTASSIUM PHOSPHATE 100 ML/HR: .53; .5; .37; .037; .03; .012; .00082 INJECTION, SOLUTION INTRAVENOUS at 00:33

## 2020-11-19 RX ADMIN — LEVOTHYROXINE SODIUM 112 MCG: 112 TABLET ORAL at 06:06

## 2020-11-20 ENCOUNTER — TRANSITIONAL CARE MANAGEMENT (OUTPATIENT)
Dept: FAMILY MEDICINE CLINIC | Facility: CLINIC | Age: 45
End: 2020-11-20

## 2020-11-20 LAB
BACTERIA BLD CULT: NORMAL
BACTERIA BLD CULT: NORMAL

## 2020-11-24 ENCOUNTER — TELEMEDICINE (OUTPATIENT)
Dept: FAMILY MEDICINE CLINIC | Facility: CLINIC | Age: 45
End: 2020-11-24
Payer: COMMERCIAL

## 2020-11-24 VITALS — HEART RATE: 78 BPM | OXYGEN SATURATION: 94 % | TEMPERATURE: 97.6 F

## 2020-11-24 DIAGNOSIS — Z09 HOSPITAL DISCHARGE FOLLOW-UP: Primary | ICD-10-CM

## 2020-11-24 DIAGNOSIS — J12.82 PNEUMONIA DUE TO COVID-19 VIRUS: ICD-10-CM

## 2020-11-24 DIAGNOSIS — U07.1 PNEUMONIA DUE TO COVID-19 VIRUS: ICD-10-CM

## 2020-11-24 PROCEDURE — 99496 TRANSJ CARE MGMT HIGH F2F 7D: CPT | Performed by: FAMILY MEDICINE

## 2020-12-03 ENCOUNTER — TELEMEDICINE (OUTPATIENT)
Dept: FAMILY MEDICINE CLINIC | Facility: CLINIC | Age: 45
End: 2020-12-03
Payer: COMMERCIAL

## 2020-12-03 VITALS — HEART RATE: 105 BPM | TEMPERATURE: 97.8 F | OXYGEN SATURATION: 96 %

## 2020-12-03 DIAGNOSIS — G47.33 OSA (OBSTRUCTIVE SLEEP APNEA): ICD-10-CM

## 2020-12-03 DIAGNOSIS — E06.3 HASHIMOTO'S THYROIDITIS: ICD-10-CM

## 2020-12-03 DIAGNOSIS — U07.1 PNEUMONIA DUE TO COVID-19 VIRUS: Primary | ICD-10-CM

## 2020-12-03 DIAGNOSIS — J12.82 PNEUMONIA DUE TO COVID-19 VIRUS: Primary | ICD-10-CM

## 2020-12-03 PROCEDURE — 99214 OFFICE O/P EST MOD 30 MIN: CPT | Performed by: FAMILY MEDICINE

## 2021-02-04 ENCOUNTER — OFFICE VISIT (OUTPATIENT)
Dept: URGENT CARE | Age: 46
End: 2021-02-04
Payer: COMMERCIAL

## 2021-02-04 VITALS
TEMPERATURE: 98.1 F | DIASTOLIC BLOOD PRESSURE: 87 MMHG | HEART RATE: 88 BPM | SYSTOLIC BLOOD PRESSURE: 148 MMHG | RESPIRATION RATE: 20 BRPM | OXYGEN SATURATION: 99 %

## 2021-02-04 DIAGNOSIS — H60.391 OTHER INFECTIVE ACUTE OTITIS EXTERNA OF RIGHT EAR: Primary | ICD-10-CM

## 2021-02-04 DIAGNOSIS — H69.81 EUSTACHIAN TUBE DYSFUNCTION, RIGHT: ICD-10-CM

## 2021-02-04 PROCEDURE — G0382 LEV 3 HOSP TYPE B ED VISIT: HCPCS | Performed by: PHYSICIAN ASSISTANT

## 2021-02-04 RX ORDER — OFLOXACIN 3 MG/ML
10 SOLUTION AURICULAR (OTIC) DAILY
Qty: 5 ML | Refills: 0 | Status: SHIPPED | OUTPATIENT
Start: 2021-02-04 | End: 2021-02-09

## 2021-02-04 NOTE — PROGRESS NOTES
[facility]        NAME: Clara Powell is a 41 y o  male  : 1975    MRN: [id number]  DATE: 2021  TIME: 6:55 PM    Assessment and Plan   Other infective acute otitis externa of right ear [H60 391]  1  Other infective acute otitis externa of right ear  ofloxacin (FLOXIN) 0 3 % otic solution   2  Eustachian tube dysfunction, right           Patient Instructions       Follow up with PCP in 3-5 days  Proceed to  ER if symptoms worsen  Chief Complaint     Chief Complaint   Patient presents with    Earache     Pt states started with right ear pain on Tuesday, c/o hearing being like "underwater"         History of Present Illness       Patient for evaluation of right ear feeling blocked and feels like there is fluid in there  He denies any dizziness, drainage, headache, congestion, sore throat, fevers  Review of Systems   Review of Systems   Constitutional: Negative  HENT: Positive for ear pain  Negative for congestion, ear discharge, postnasal drip, rhinorrhea, sinus pressure, sinus pain, sneezing, sore throat and trouble swallowing            Current Medications       Current Outpatient Medications:     acetaminophen (TYLENOL) 325 mg tablet, Take 2 tablets (650 mg total) by mouth every 6 (six) hours as needed for mild pain, Disp: 30 tablet, Rfl: 0    levothyroxine 112 mcg tablet, Take 1 tablet (112 mcg total) by mouth daily, Disp: 90 tablet, Rfl: 3    albuterol (PROVENTIL HFA,VENTOLIN HFA) 90 mcg/act inhaler, Inhale 2 puffs every 4 (four) hours as needed for wheezing (Patient not taking: Reported on 2021), Disp: 1 Inhaler, Rfl: 0    apixaban (ELIQUIS) 5 mg, Take 1 tablet (5 mg total) by mouth 2 (two) times a day (Patient not taking: Reported on 2021), Disp: 60 tablet, Rfl: 0    ascorbic acid (VITAMIN C) 1000 MG tablet, Take 1 tablet (1,000 mg total) by mouth every 12 (twelve) hours for 5 doses, Disp: 5 tablet, Rfl: 0    cholecalciferol (VITAMIN D3) 1,000 units tablet, Take 2 tablets (2,000 Units total) by mouth daily for 2 days, Disp: 4 tablet, Rfl: 0    dextromethorphan-guaiFENesin (ROBITUSSIN DM)  mg/5 mL syrup, Take 10 mL by mouth every 4 (four) hours as needed for cough (Patient not taking: Reported on 2/4/2021), Disp: , Rfl: 0    melatonin 3 mg, Take 1 tablet (3 mg total) by mouth daily at bedtime as needed (Sleep) (Patient not taking: Reported on 2/4/2021), Disp: 30 tablet, Rfl: 0    ofloxacin (FLOXIN) 0 3 % otic solution, Administer 10 drops to the right ear daily, Disp: 5 mL, Rfl: 0    predniSONE 10 mg tablet, Take 4 tablets (40 mg total) by mouth daily For 5 days then decrease by 1 tablet daily until complete (Patient not taking: Reported on 2/4/2021), Disp: 26 tablet, Rfl: 0    zinc sulfate (ZINCATE) 220 mg capsule, Take 1 capsule (220 mg total) by mouth daily for 2 doses, Disp: 2 capsule, Rfl: 0    Current Allergies     Allergies as of 02/04/2021 - Reviewed 02/04/2021   Allergen Reaction Noted    Pollen extract  05/31/2016    Succinylcholine Other (See Comments) 09/17/2020            The following portions of the patient's history were reviewed and updated as appropriate: allergies, current medications, past family history, past medical history, past social history, past surgical history and problem list      Past Medical History:   Diagnosis Date    Biallelic mutation of RYR1 gene     Bruxism 3/28/2018    Disease of thyroid gland     Hashimoto's thyroiditis     Hashimoto's thyroiditis     Incisional hernia, without obstruction or gangrene 1/22/2019    Added automatically from request for surgery [id number]    Positive Lyme disease serology     Right knee pain     last assessed - [id number]    Sleep apnea     Spermatocele     Status post laparoscopic hernia repair 9/51/6291    Umbilical hernia without obstruction or gangrene     last assessed - [id number]    Umbilical pain     resolved - [id number]    Umbilical swelling     resolved - [id number]    Varicose veins of both lower extremities     Ventral hernia without obstruction or gangrene     last assessed - [id number]       Past Surgical History:   Procedure Laterality Date    [place] ENDOVENOUS LASER, 1ST VEIN Right 9/26/2017    Procedure: GSV ENDOVASCULAR LASER THERAPY W/ MULTIPLE STAB PHLEBECTOMIES;  Surgeon: Angelia Wilson MD;  [facility];  Service: Vascular    [place] LAP, INCISIONAL HERNIA REPAIR,REDUCIBLE N/A 2/26/2019    Procedure: REPAIR HERNIA INCISIONAL LAPAROSCOPIC;  Surgeon: Merlinda Garin, MD;  [facility];  Service: General    SKIN SURGERY      MOLES REMOVED AS CHILD    TONSILLECTOMY      UMBILICAL HERNIA REPAIR      last assessed - [id number]    VENTRAL HERNIA REPAIR         Family History   Problem Relation Age of Onset    Melanoma Mother     Hashimoto's thyroiditis Brother     Heart disease Family          Medications have been verified  Objective   /87   Pulse 88   Temp 98 1 °F (36 7 °C)   Resp 20   SpO2 99%   No LMP for male patient  Physical Exam     Physical Exam  Vitals signs and nursing note reviewed  Constitutional:       General: He is not in acute distress  Appearance: Normal appearance  He is well-developed  He is not ill-appearing, toxic-appearing or diaphoretic  HENT:      Head: Normocephalic and atraumatic  Left Ear: Tympanic membrane, ear canal and external ear normal       Ears:      Comments: Light colored debris in the right ear canal and unable to fully visualize the TM  Flush the year with warm water and hydrogen peroxide  Did get some light colored debris out  No cerumen noted  Still some adherent debris in the canal and only able to visualize the upper portion of the TM which is dull with no erythema  Eyes:      Extraocular Movements: Extraocular movements intact  Conjunctiva/sclera: Conjunctivae normal       Pupils: Pupils are equal, round, and reactive to light     Skin:     General: Skin is warm and dry  Neurological:      General: No focal deficit present  Mental Status: He is alert and oriented to person, place, and time  Psychiatric:         Mood and Affect: Mood normal          Behavior: Behavior normal          Thought Content:  Thought content normal          Judgment: Judgment normal

## 2021-02-04 NOTE — PATIENT INSTRUCTIONS
Use Flonase nasal spray and Claritin as directed    Follow-up with your primary care physician if symptoms persist

## 2021-02-13 ENCOUNTER — IMMUNIZATIONS (OUTPATIENT)
Dept: FAMILY MEDICINE CLINIC | Facility: HOSPITAL | Age: 46
End: 2021-02-13

## 2021-02-13 DIAGNOSIS — Z23 ENCOUNTER FOR IMMUNIZATION: Primary | ICD-10-CM

## 2021-02-13 PROCEDURE — 91300 SARS-COV-2 / COVID-19 MRNA VACCINE (PFIZER-BIONTECH) 30 MCG: CPT

## 2021-02-13 PROCEDURE — 0001A SARS-COV-2 / COVID-19 MRNA VACCINE (PFIZER-BIONTECH) 30 MCG: CPT

## 2021-03-06 ENCOUNTER — IMMUNIZATIONS (OUTPATIENT)
Dept: FAMILY MEDICINE CLINIC | Facility: HOSPITAL | Age: 46
End: 2021-03-06

## 2021-03-06 DIAGNOSIS — Z23 ENCOUNTER FOR IMMUNIZATION: Primary | ICD-10-CM

## 2021-03-06 PROCEDURE — 91300 SARS-COV-2 / COVID-19 MRNA VACCINE (PFIZER-BIONTECH) 30 MCG: CPT

## 2021-03-06 PROCEDURE — 0002A SARS-COV-2 / COVID-19 MRNA VACCINE (PFIZER-BIONTECH) 30 MCG: CPT

## 2021-03-17 PROCEDURE — 87205 SMEAR GRAM STAIN: CPT | Performed by: NURSE PRACTITIONER

## 2021-03-17 PROCEDURE — 87107 FUNGI IDENTIFICATION MOLD: CPT | Performed by: NURSE PRACTITIONER

## 2021-03-17 PROCEDURE — 87070 CULTURE OTHR SPECIMN AEROBIC: CPT | Performed by: NURSE PRACTITIONER

## 2021-03-19 ENCOUNTER — OFFICE VISIT (OUTPATIENT)
Dept: FAMILY MEDICINE CLINIC | Facility: CLINIC | Age: 46
End: 2021-03-19
Payer: COMMERCIAL

## 2021-03-19 VITALS
DIASTOLIC BLOOD PRESSURE: 80 MMHG | BODY MASS INDEX: 41.59 KG/M2 | SYSTOLIC BLOOD PRESSURE: 130 MMHG | WEIGHT: 313.8 LBS | OXYGEN SATURATION: 97 % | HEART RATE: 83 BPM | HEIGHT: 73 IN | TEMPERATURE: 96.4 F

## 2021-03-19 DIAGNOSIS — E06.3 HASHIMOTO'S THYROIDITIS: ICD-10-CM

## 2021-03-19 DIAGNOSIS — E66.01 MORBID OBESITY WITH BODY MASS INDEX (BMI) OF 40.0 OR HIGHER (HCC): ICD-10-CM

## 2021-03-19 DIAGNOSIS — G47.33 OSA (OBSTRUCTIVE SLEEP APNEA): Primary | ICD-10-CM

## 2021-03-19 DIAGNOSIS — E55.9 VITAMIN D DEFICIENCY: ICD-10-CM

## 2021-03-19 PROBLEM — R79.89 ELEVATED LACTIC ACID LEVEL: Status: RESOLVED | Noted: 2020-11-15 | Resolved: 2021-03-19

## 2021-03-19 PROBLEM — J12.82 PNEUMONIA DUE TO COVID-19 VIRUS: Status: RESOLVED | Noted: 2020-11-15 | Resolved: 2021-03-19

## 2021-03-19 PROBLEM — R19.7 DIARRHEA: Status: RESOLVED | Noted: 2020-11-15 | Resolved: 2021-03-19

## 2021-03-19 PROBLEM — U07.1 PNEUMONIA DUE TO COVID-19 VIRUS: Status: RESOLVED | Noted: 2020-11-15 | Resolved: 2021-03-19

## 2021-03-19 PROBLEM — R07.9 CHEST PAIN: Status: RESOLVED | Noted: 2020-11-15 | Resolved: 2021-03-19

## 2021-03-19 PROCEDURE — 99214 OFFICE O/P EST MOD 30 MIN: CPT | Performed by: FAMILY MEDICINE

## 2021-03-19 NOTE — PROGRESS NOTES
Assessment/Plan:    No problem-specific Assessment & Plan notes found for this encounter  Diagnoses and all orders for this visit:    VIJI (obstructive sleep apnea)  Comments:  feeling well overall  CPAP machine at bedtime    Hashimoto's thyroiditis/hypothyroidism  Comments:  stable on current meds  will recheck labs  Orders:  -     Comprehensive metabolic panel  -     Lipid Panel with Direct LDL reflex; Future  -     TSH, 3rd generation with Free T4 reflex; Future  -     T4, free; Future  -     T3, free; Future    Morbid obesity with body mass index (BMI) of 40 0 or higher (HCC)  Comments:  diet and exercise encouraged  weight loss goal of 10 pounds in 6 months     Vitamin D deficiency  -     Vitamin D 25 hydroxy; Future          Subjective:      Patient ID: Phuong Lewis is a 41 y o  male  Here for follow up  Feeling well overall  Gained 13 pounds since last visit since he has been sitting more   Seeing ENT for right ear infection   Being treated with clotrimazole currently    Hypothyroidism  Phuong Lewis is a 41 y o  male who presents for follow up of hypothyroidism  Current symptoms: none   Patient denies change in energy level, diarrhea, heat / cold intolerance and nervousness  Symptoms have been well-controlled  The following portions of the patient's history were reviewed and updated as appropriate: allergies, current medications, past family history, past medical history, past social history, past surgical history and problem list     Review of Systems   Constitutional: Negative  HENT: Negative  Respiratory: Negative  Cardiovascular: Negative  Endocrine: Negative  Genitourinary: Negative  Musculoskeletal: Negative  Allergic/Immunologic: Negative  Neurological: Negative  Hematological: Negative  Psychiatric/Behavioral: Negative            Objective:      /80 (BP Location: Left arm, Patient Position: Sitting, Cuff Size: Large)   Pulse 83   Temp (!) 96 4 °F (35 8 °C) (Tympanic)   Ht 6' 1" (1 854 m)   Wt (!) 142 kg (313 lb 12 8 oz)   SpO2 97%   BMI 41 40 kg/m²          Physical Exam  Constitutional:       Appearance: Normal appearance  HENT:      Right Ear: Swelling present  Mouth/Throat:      Pharynx: No oropharyngeal exudate or posterior oropharyngeal erythema  Eyes:      Extraocular Movements: Extraocular movements intact  Pupils: Pupils are equal, round, and reactive to light  Cardiovascular:      Rate and Rhythm: Normal rate and regular rhythm  Pulses: Normal pulses  Heart sounds: Normal heart sounds  Neurological:      General: No focal deficit present  Mental Status: He is alert and oriented to person, place, and time     Psychiatric:         Mood and Affect: Mood normal          Behavior: Behavior normal

## 2021-03-23 ENCOUNTER — APPOINTMENT (OUTPATIENT)
Dept: LAB | Facility: HOSPITAL | Age: 46
End: 2021-03-23
Payer: COMMERCIAL

## 2021-03-23 DIAGNOSIS — E55.9 VITAMIN D DEFICIENCY: ICD-10-CM

## 2021-03-23 DIAGNOSIS — E06.3 HASHIMOTO'S THYROIDITIS: ICD-10-CM

## 2021-03-23 LAB
25(OH)D3 SERPL-MCNC: 14 NG/ML (ref 30–100)
ALBUMIN SERPL BCP-MCNC: 4.1 G/DL (ref 3.5–5)
ALP SERPL-CCNC: 80 U/L (ref 46–116)
ALT SERPL W P-5'-P-CCNC: 93 U/L (ref 12–78)
ANION GAP SERPL CALCULATED.3IONS-SCNC: 9 MMOL/L (ref 4–13)
AST SERPL W P-5'-P-CCNC: 44 U/L (ref 5–45)
BILIRUB SERPL-MCNC: 0.67 MG/DL (ref 0.2–1)
BUN SERPL-MCNC: 14 MG/DL (ref 5–25)
CALCIUM SERPL-MCNC: 8.9 MG/DL (ref 8.3–10.1)
CHLORIDE SERPL-SCNC: 100 MMOL/L (ref 100–108)
CHOLEST SERPL-MCNC: 246 MG/DL (ref 50–200)
CO2 SERPL-SCNC: 29 MMOL/L (ref 21–32)
CREAT SERPL-MCNC: 1.15 MG/DL (ref 0.6–1.3)
GFR SERPL CREATININE-BSD FRML MDRD: 77 ML/MIN/1.73SQ M
GLUCOSE SERPL-MCNC: 116 MG/DL (ref 65–140)
HDLC SERPL-MCNC: 41 MG/DL
LDLC SERPL CALC-MCNC: 132 MG/DL (ref 0–100)
POTASSIUM SERPL-SCNC: 4.2 MMOL/L (ref 3.5–5.3)
PROT SERPL-MCNC: 7.7 G/DL (ref 6.4–8.2)
SODIUM SERPL-SCNC: 138 MMOL/L (ref 136–145)
T3FREE SERPL-MCNC: 3.06 PG/ML (ref 2.3–4.2)
T4 FREE SERPL-MCNC: 0.89 NG/DL (ref 0.76–1.46)
T4 FREE SERPL-MCNC: 0.91 NG/DL (ref 0.76–1.46)
TRIGL SERPL-MCNC: 364 MG/DL
TSH SERPL DL<=0.05 MIU/L-ACNC: 7.67 UIU/ML (ref 0.36–3.74)

## 2021-03-23 PROCEDURE — 84481 FREE ASSAY (FT-3): CPT

## 2021-03-23 PROCEDURE — 80053 COMPREHEN METABOLIC PANEL: CPT | Performed by: FAMILY MEDICINE

## 2021-03-23 PROCEDURE — 80061 LIPID PANEL: CPT

## 2021-03-23 PROCEDURE — 82306 VITAMIN D 25 HYDROXY: CPT

## 2021-03-23 PROCEDURE — 84439 ASSAY OF FREE THYROXINE: CPT

## 2021-03-23 PROCEDURE — 36415 COLL VENOUS BLD VENIPUNCTURE: CPT | Performed by: FAMILY MEDICINE

## 2021-03-23 PROCEDURE — 84443 ASSAY THYROID STIM HORMONE: CPT

## 2021-03-23 RX ORDER — LEVOTHYROXINE SODIUM 0.12 MG/1
125 TABLET ORAL DAILY
Qty: 90 TABLET | Refills: 0 | Status: SHIPPED | OUTPATIENT
Start: 2021-03-23 | End: 2021-04-28 | Stop reason: SDUPTHER

## 2021-03-23 RX ORDER — ERGOCALCIFEROL 1.25 MG/1
50000 CAPSULE ORAL WEEKLY
Qty: 8 CAPSULE | Refills: 0 | Status: SHIPPED | OUTPATIENT
Start: 2021-03-23 | End: 2021-09-24

## 2021-04-05 ENCOUNTER — APPOINTMENT (OUTPATIENT)
Dept: URGENT CARE | Facility: CLINIC | Age: 46
End: 2021-04-05

## 2021-04-05 ENCOUNTER — TRANSCRIBE ORDERS (OUTPATIENT)
Dept: URGENT CARE | Facility: CLINIC | Age: 46
End: 2021-04-05

## 2021-04-05 DIAGNOSIS — Z00.00 PHYSICAL EXAM: Primary | ICD-10-CM

## 2021-04-05 DIAGNOSIS — Z00.00 PHYSICAL EXAM: ICD-10-CM

## 2021-04-05 PROCEDURE — 86480 TB TEST CELL IMMUN MEASURE: CPT

## 2021-04-05 PROCEDURE — 86787 VARICELLA-ZOSTER ANTIBODY: CPT

## 2021-04-06 LAB — VZV IGG SER IA-ACNC: NORMAL

## 2021-04-08 LAB
GAMMA INTERFERON BACKGROUND BLD IA-ACNC: 0.04 IU/ML
M TB IFN-G BLD-IMP: NEGATIVE
M TB IFN-G CD4+ BCKGRND COR BLD-ACNC: -0.01 IU/ML
M TB IFN-G CD4+ BCKGRND COR BLD-ACNC: -0.01 IU/ML
MITOGEN IGNF BCKGRD COR BLD-ACNC: >10 IU/ML

## 2021-04-16 ENCOUNTER — OFFICE VISIT (OUTPATIENT)
Dept: SLEEP CENTER | Facility: CLINIC | Age: 46
End: 2021-04-16
Payer: COMMERCIAL

## 2021-04-16 VITALS
HEIGHT: 73 IN | WEIGHT: 305.6 LBS | DIASTOLIC BLOOD PRESSURE: 90 MMHG | BODY MASS INDEX: 40.5 KG/M2 | SYSTOLIC BLOOD PRESSURE: 122 MMHG

## 2021-04-16 DIAGNOSIS — F51.12 INSUFFICIENT SLEEP SYNDROME: ICD-10-CM

## 2021-04-16 DIAGNOSIS — F45.8 BRUXISM: ICD-10-CM

## 2021-04-16 DIAGNOSIS — E66.01 MORBID OBESITY WITH BODY MASS INDEX (BMI) OF 40.0 OR HIGHER (HCC): ICD-10-CM

## 2021-04-16 DIAGNOSIS — R09.81 NASAL CONGESTION: ICD-10-CM

## 2021-04-16 DIAGNOSIS — G47.33 OSA (OBSTRUCTIVE SLEEP APNEA): Primary | ICD-10-CM

## 2021-04-16 DIAGNOSIS — G47.61 PLMD (PERIODIC LIMB MOVEMENT DISORDER): ICD-10-CM

## 2021-04-16 PROCEDURE — 99214 OFFICE O/P EST MOD 30 MIN: CPT | Performed by: INTERNAL MEDICINE

## 2021-04-16 NOTE — PROGRESS NOTES
Follow-Up Note - [facility]   Luz Marina Harvey  41 y o  male  :1975  QOS:[id number]    CC: I saw this patient for follow-up in clinic today for Sleep disordered breathing, Coexisting Sleep and Medical Problems  A diagnostic study in 2018 demonstrated mild  obstructive sleep apnea: AHI 6 /hour     Intermittent snoring of moderate intensity was noted there were also 6 respiratory effort-related arousals    Minimum oxygen saturation 82 %  and 1 2 minute of total sleep time was spent with saturations less than 90%  There were periodic limb movements of sleep for an index of 19 per hour but rarely associated with arousal   Auto titrating Pap was initiated  PFSH, Problem List, Medications & Allergies were reviewed in EMR  Interval changes: none reported  He  has a past medical history of Biallelic mutation of RYR1 gene, Bruxism (3/28/2018), Disease of thyroid gland, Hashimoto's thyroiditis, Hashimoto's thyroiditis, Incisional hernia, without obstruction or gangrene (2019), Pneumonia due to COVID-19 virus (11/15/2020), Positive Lyme disease serology, Right knee pain, Sleep apnea, Spermatocele, Status post laparoscopic hernia repair (), Umbilical hernia without obstruction or gangrene, Umbilical pain, Umbilical swelling, Varicose veins of both lower extremities, and Ventral hernia without obstruction or gangrene  He has a current medication list which includes the following prescription(s): ergocalciferol, levothyroxine, and clotrimazole  ROS: as attached  Significant for some weight gain  PHYSIOLOGICAL DATA REVIEW AND INTERPRETATION:   using PAP > 4 hours/night 93%   Estimated SANTOSH 1 7/hour with pressure of 8 1cm H2O @90th percentile  Compliance: excellent; Sleep disordered breathing:stable & within target range    SUBJECTIVE: Regarding use of PAP, Kanwal Cooper reports:   · He is experiencing some adverse effects: nasal congestion due to allergies for which he uses Zyrtec  · He is benefiting from use: sleeping better and no longer snoring / having breathing difficulties   · He has some paresthesias involving left lower extremity but is not aware of jerking during sleep  · He is not aware of teeth grinding during sleep  Sleep Routine: He works 2nd shift reports getting 6-8 hrs sleep  ; he no difficulty initiating or maintaining sleep   He awakens spontaneously and is not always refreshed  Annalee Larkin reports Excessive Daytime Sleepiness feels like napping but does not have the opportunity but rated himself at Total score: 5 /24 on the [place] Sleepiness Scale  Habits: reports that he has quit smoking  He has never used smokeless tobacco ,  reports current alcohol use ,  reports no history of drug use , Caffeine use: limited , Exercise routine: none   EXAM: /90   Ht 6' 1" (1 854 m)   Wt (!) 139 kg (305 lb 9 6 oz)   BMI 40 32 kg/m²     Patient is well groomed; well appearing  Skin/Extrem: col & hydration normal; no edema  Psych: cooperativeand in no distress  Mental state:appears normal   CNS: Alert, orientated, clear & coherent speech  H&N: EOMI; NC/AT:no facial pressure marks, no rashes  Physical findings otherwise essentially unchanged from previous  IMPRESSION: Problem List Items & Comorbidities Addressed this Visit    1  VIJI (obstructive sleep apnea)  PAP DME Resupply/Reorder   2  PLMD (periodic limb movement disorder)     3  Insufficient sleep syndrome     4  Bruxism     5  Nasal congestion     6  Morbid obesity with body mass index (BMI) of 40 0 or higher ([facility])         PLAN:  1  I reviewed results of prior studies and physiologic data with the patient  I discussed treatment options with risks and benefits  2  Treatment with  PAP is medically necessary and Annalee Larkin is agreable to continue use  3  Care of equipment, methods to improve comfort using PAP and importance of compliance with therapy were discussed    4  Pressure setting: continue 6-9 cmH2O     5  Rx provided to replace supplies and Care coordinated with DME provider  6  No medication is needed for the periodic limb movements of sleep  7  Discussed strategies for weight reduction  8  I also advised allowing sufficient opportunity for sleep  9  Follow-up is advised in 1 year or sooner if needed to monitor progress, compliance and to adjust therapy  Thank you for allowing me to participate in the care of this patient      Sincerely,    Authenticated electronically by Araceli Keating MD on 57/88/12   Board Certified Specialist

## 2021-04-16 NOTE — PROGRESS NOTES
Review of Systems      Genitourinary none   Cardiology none   Gastrointestinal none   Neurology numbness/tingling of an extremity   Constitutional weight change   Integumentary none   Psychiatry none   Musculoskeletal none   Pulmonary none   ENT none   Endocrine none   Hematological none

## 2021-04-19 ENCOUNTER — TELEPHONE (OUTPATIENT)
Dept: SLEEP CENTER | Facility: CLINIC | Age: 46
End: 2021-04-19

## 2021-04-28 DIAGNOSIS — E06.3 HASHIMOTO'S THYROIDITIS: ICD-10-CM

## 2021-04-28 RX ORDER — LEVOTHYROXINE SODIUM 0.12 MG/1
125 TABLET ORAL DAILY
Qty: 90 TABLET | Refills: 0 | Status: SHIPPED | OUTPATIENT
Start: 2021-04-28 | End: 2021-10-01 | Stop reason: SDUPTHER

## 2021-07-15 ENCOUNTER — APPOINTMENT (OUTPATIENT)
Dept: LAB | Facility: CLINIC | Age: 46
End: 2021-07-15

## 2021-07-15 DIAGNOSIS — Z00.8 HEALTH EXAMINATION IN POPULATION SURVEY: ICD-10-CM

## 2021-07-15 LAB
CHOLEST SERPL-MCNC: 185 MG/DL (ref 50–200)
EST. AVERAGE GLUCOSE BLD GHB EST-MCNC: 111 MG/DL
HBA1C MFR BLD: 5.5 %
HDLC SERPL-MCNC: 34 MG/DL
LDLC SERPL CALC-MCNC: 86 MG/DL (ref 0–100)
NONHDLC SERPL-MCNC: 151 MG/DL
TRIGL SERPL-MCNC: 323 MG/DL

## 2021-07-15 PROCEDURE — 80061 LIPID PANEL: CPT

## 2021-07-15 PROCEDURE — 83036 HEMOGLOBIN GLYCOSYLATED A1C: CPT

## 2021-07-15 PROCEDURE — 36415 COLL VENOUS BLD VENIPUNCTURE: CPT

## 2021-09-01 ENCOUNTER — OFFICE VISIT (OUTPATIENT)
Dept: URGENT CARE | Facility: MEDICAL CENTER | Age: 46
End: 2021-09-01
Payer: COMMERCIAL

## 2021-09-01 VITALS
TEMPERATURE: 97 F | WEIGHT: 290 LBS | HEART RATE: 77 BPM | BODY MASS INDEX: 38.43 KG/M2 | RESPIRATION RATE: 18 BRPM | OXYGEN SATURATION: 97 % | HEIGHT: 73 IN

## 2021-09-01 DIAGNOSIS — R05.9 COUGH: Primary | ICD-10-CM

## 2021-09-01 PROCEDURE — G0382 LEV 3 HOSP TYPE B ED VISIT: HCPCS | Performed by: PHYSICIAN ASSISTANT

## 2021-09-01 PROCEDURE — U0003 INFECTIOUS AGENT DETECTION BY NUCLEIC ACID (DNA OR RNA); SEVERE ACUTE RESPIRATORY SYNDROME CORONAVIRUS 2 (SARS-COV-2) (CORONAVIRUS DISEASE [COVID-19]), AMPLIFIED PROBE TECHNIQUE, MAKING USE OF HIGH THROUGHPUT TECHNOLOGIES AS DESCRIBED BY CMS-2020-01-R: HCPCS | Performed by: PHYSICIAN ASSISTANT

## 2021-09-01 PROCEDURE — U0005 INFEC AGEN DETEC AMPLI PROBE: HCPCS | Performed by: PHYSICIAN ASSISTANT

## 2021-09-01 NOTE — PROGRESS NOTES
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 cough, generalized malaise and headache x 3 days  Patient states a coworker tested positive for covid 5 days ago  Denies chest pain, SOB      Review of Systems   Review of Systems   Constitutional: Positive for fatigue  HENT: Positive for congestion  Negative for dental problem, drooling, ear pain, postnasal drip, rhinorrhea, sinus pain, sore throat, trouble swallowing and voice change  Eyes: Negative  Respiratory: Positive for cough  Negative for apnea, choking, chest tightness, shortness of breath, wheezing and stridor  Cardiovascular: Negative  Negative for chest pain           Current Medications       Current Outpatient Medications:     levothyroxine 125 mcg tablet, Take 1 tablet (125 mcg total) by mouth daily, Disp: 90 tablet, Rfl: 0    clotrimazole 1 % external solution, Apply 1 application topically 2 (two) times a day for 14 days 5 drops right ear (Patient not taking: Reported on 2021), Disp: 30 mL, Rfl: 0    ergocalciferol (VITAMIN D2) 50,000 units, Take 1 capsule (50,000 Units total) by mouth once a week (Patient not taking: Reported on 2021), Disp: 8 capsule, Rfl: 0    Current Allergies     Allergies as of 2021 - Reviewed 2021   Allergen Reaction Noted    Pollen extract  2016    Succinylcholine Other (See Comments) 09/17/2020            The following portions of the patient's history were reviewed and updated as appropriate: allergies, current medications, past family history, past medical history, past social history, past surgical history and problem list      Past Medical History:   Diagnosis Date    Biallelic mutation of RYR1 gene     Bruxism 3/28/2018    Disease of thyroid gland     Hashimoto's thyroiditis     Hashimoto's thyroiditis     Incisional hernia, without obstruction or gangrene 1/22/2019    Added automatically from request for surgery [id number]    Pneumonia due to COVID-19 virus 11/15/2020    Positive Lyme disease serology     Right knee pain     last assessed - [id number]    Sleep apnea     Spermatocele     Status post laparoscopic hernia repair 6/45/0842    Umbilical hernia without obstruction or gangrene     last assessed - [id number]    Umbilical pain     resolved - [id number]    Umbilical swelling     resolved - [id number]    Varicose veins of both lower extremities     Ventral hernia without obstruction or gangrene     last assessed - [id number]       Past Surgical History:   Procedure Laterality Date    LA ENDOVENOUS LASER, 1ST VEIN Right 9/26/2017    Procedure: GSV ENDOVASCULAR LASER THERAPY W/ MULTIPLE STAB PHLEBECTOMIES;  Surgeon: Elizabeth Wilson MD;  [facility];  Service: Vascular    LA LAP, INCISIONAL HERNIA REPAIR,REDUCIBLE N/A 2/26/2019    Procedure: REPAIR HERNIA INCISIONAL LAPAROSCOPIC;  Surgeon: Lewis Santana MD;  [facility];  Service: General    SKIN SURGERY      MOLES REMOVED AS CHILD    TONSILLECTOMY      UMBILICAL HERNIA REPAIR      last assessed - [id number]    VENTRAL HERNIA REPAIR         Family History   Problem Relation Age of Onset    Melanoma Mother     Hashimoto's thyroiditis Brother     Heart disease Family          Medications have been verified          Objective   Pulse 77   Temp (!) 97 °F (36 1 °C)   Resp 18   Ht 6' 1" (1 854 m) Wt 132 kg (290 lb)   SpO2 97%   BMI 38 26 kg/m²        Physical Exam     Physical Exam  Constitutional:       General: He is not in acute distress  Appearance: He is well-developed and normal weight  He is not diaphoretic  HENT:      Head: Normocephalic and atraumatic  Right Ear: Hearing, tympanic membrane, ear canal and external ear normal       Left Ear: Hearing, tympanic membrane, ear canal and external ear normal       Nose: No congestion or rhinorrhea  Right Sinus: No maxillary sinus tenderness or frontal sinus tenderness  Left Sinus: No maxillary sinus tenderness or frontal sinus tenderness  Mouth/Throat:      Pharynx: Uvula midline  Cardiovascular:      Rate and Rhythm: Normal rate and regular rhythm  Heart sounds: Normal heart sounds  Pulmonary:      Effort: Pulmonary effort is normal  No respiratory distress  Breath sounds: Normal breath sounds  No wheezing or rales  Chest:      Chest wall: No tenderness  Musculoskeletal:      Cervical back: Normal range of motion and neck supple  Lymphadenopathy:      Cervical: No cervical adenopathy  Neurological:      Mental Status: He is alert

## 2021-09-01 NOTE — PATIENT INSTRUCTIONS
Cough  covid test sent  Follow up with PCP in 3-5 days  Proceed to  ER if symptoms worsen  [address]    Your healthcare provider and/or public health staff have evaluated you and have determined that you do not need to remain in the hospital at this time  At this time you can be isolated at home where you will be monitored by staff from your local or state health department  You should carefully follow the prevention and isolation steps below until a healthcare provider or local or state health department says that you can return to your normal activities  Stay home except to get medical care    People who are mildly ill with COVID-19 are able to isolate at home during their illness  You should restrict activities outside your home, except for getting medical care  Do not go to work, school, or public areas  Avoid using public transportation, ride-sharing, or taxis  Separate yourself from other people and animals in your home    People: As much as possible, you should stay in a specific room and away from other people in your home  Also, you should use a separate bathroom, if available  Animals: You should restrict contact with pets and other animals while you are sick with COVID-19, just like you would around other people  Although there have not been reports of pets or other animals becoming sick with COVID-19, it is still recommended that people sick with COVID-19 limit contact with animals until more information is known about the virus  When possible, have another member of your household care for your animals while you are sick  If you are sick with COVID-19, avoid contact with your pet, including petting, snuggling, being kissed or licked, and sharing food  If you must care for your pet or be around animals while you are sick, wash your hands before and after you interact with pets and wear a facemask  See COVID-19 and Animals for more information      Call ahead before visiting your doctor    If you have a medical appointment, call the healthcare provider and tell them that you have or may have COVID-19  This will help the healthcare providers office take steps to keep other people from getting infected or exposed  Wear a facemask    You should wear a facemask when you are around other people (e g , sharing a room or vehicle) or pets and before you enter a healthcare providers office  If you are not able to wear a facemask (for example, because it causes trouble breathing), then people who live with you should not stay in the same room with you, or they should wear a facemask if they enter your room  Cover your coughs and sneezes    Cover your mouth and nose with a tissue when you cough or sneeze  Throw used tissues in a lined trash can  Immediately wash your hands with soap and water for at least 20 seconds or, if soap and water are not available, clean your hands with an alcohol-based hand  that contains at least 60% alcohol  Clean your hands often    Wash your hands often with soap and water for at least 20 seconds, especially after blowing your nose, coughing, or sneezing; going to the bathroom; and before eating or preparing food  If soap and water are not readily available, use an alcohol-based hand  with at least 60% alcohol, covering all surfaces of your hands and rubbing them together until they feel dry  Soap and water are the best option if hands are visibly dirty  Avoid touching your eyes, nose, and mouth with unwashed hands  Avoid sharing personal household items    You should not share dishes, drinking glasses, cups, eating utensils, towels, or bedding with other people or pets in your home  After using these items, they should be washed thoroughly with soap and water      Clean all high-touch surfaces everyday    High touch surfaces include counters, tabletops, doorknobs, bathroom fixtures, toilets, phones, keyboards, tablets, and bedside tables  Also, clean any surfaces that may have blood, stool, or body fluids on them  Use a household cleaning spray or wipe, according to the label instructions  Labels contain instructions for safe and effective use of the cleaning product including precautions you should take when applying the product, such as wearing gloves and making sure you have good ventilation during use of the product  Monitor your symptoms    Seek prompt medical attention if your illness is worsening (e g , difficulty breathing)  Before seeking care, call your healthcare provider and tell them that you have, or are being evaluated for, COVID-19  Put on a facemask before you enter the facility  These steps will help the healthcare providers office to keep other people in the office or waiting room from getting infected or exposed  Ask your healthcare provider to call the local or [facility] health department  Persons who are placed under active monitoring or facilitated self-monitoring should follow instructions provided by their local health department or occupational health professionals, as appropriate  If you have a medical emergency and need to call 911, notify the dispatch personnel that you have, or are being evaluated for COVID-19  If possible, put on a facemask before emergency medical services arrive      Discontinuing home isolation    Patients with confirmed COVID-19 should remain under home isolation precautions until the following conditions are met:   - They have had no fever for at least 24 hours (that is one full day of no fever without the use medicine that reduces fevers)  AND  - other symptoms have improved (for example, when their cough or shortness of breath have improved)  AND  - If had mild or moderate illness, at least 10 days have passed since their symptoms first appeared or if severe illness (needed oxygen) or immunosuppressed, at least 20 days have passed since symptoms first appeared  Patients with confirmed COVID-19 should also notify close contacts (including their workplace) and ask that they self-quarantine  Currently, close contact is defined as being within 6 feet for 15 minutes or more from the period 24 hours starting 48 hours before symptom onset to the time at which the patient went into isolation  Close contacts of patients diagnosed with COVID-19 should be instructed by the patient to self-quarantine for 14 days from the last time of their last contact with the patient       Source: [employer]

## 2021-09-24 ENCOUNTER — OFFICE VISIT (OUTPATIENT)
Dept: FAMILY MEDICINE CLINIC | Facility: CLINIC | Age: 46
End: 2021-09-24
Payer: COMMERCIAL

## 2021-09-24 VITALS
HEART RATE: 89 BPM | SYSTOLIC BLOOD PRESSURE: 118 MMHG | TEMPERATURE: 97.2 F | OXYGEN SATURATION: 96 % | RESPIRATION RATE: 16 BRPM | WEIGHT: 291 LBS | HEIGHT: 73 IN | BODY MASS INDEX: 38.57 KG/M2 | DIASTOLIC BLOOD PRESSURE: 76 MMHG

## 2021-09-24 DIAGNOSIS — E66.01 MORBID OBESITY WITH BODY MASS INDEX (BMI) OF 40.0 OR HIGHER (HCC): ICD-10-CM

## 2021-09-24 DIAGNOSIS — G47.33 OSA (OBSTRUCTIVE SLEEP APNEA): ICD-10-CM

## 2021-09-24 DIAGNOSIS — G47.26 SHIFT WORK SLEEP DISORDER: ICD-10-CM

## 2021-09-24 DIAGNOSIS — Z23 NEED FOR INFLUENZA VACCINATION: ICD-10-CM

## 2021-09-24 DIAGNOSIS — Z00.00 ANNUAL PHYSICAL EXAM: Primary | ICD-10-CM

## 2021-09-24 DIAGNOSIS — E06.3 HASHIMOTO'S THYROIDITIS: ICD-10-CM

## 2021-09-24 PROCEDURE — 99396 PREV VISIT EST AGE 40-64: CPT | Performed by: FAMILY MEDICINE

## 2021-09-24 PROCEDURE — 90686 IIV4 VACC NO PRSV 0.5 ML IM: CPT

## 2021-09-24 PROCEDURE — 90471 IMMUNIZATION ADMIN: CPT

## 2021-09-24 RX ORDER — ZOLPIDEM TARTRATE 5 MG/1
5 TABLET ORAL
Qty: 30 TABLET | Refills: 0 | Status: SHIPPED | OUTPATIENT
Start: 2021-09-24

## 2021-09-24 NOTE — PROGRESS NOTES
[facility]    NAME: Aline Gomez  AGE: 41 y o  SEX: male  : 1975     DATE: 2021     Assessment and Plan:     Problem List Items Addressed This Visit        Endocrine    Hashimoto's thyroiditis/hypothyroidism    Relevant Orders    TSH, 3rd generation with Free T4 reflex    T3, free    Lipid Panel with Direct LDL reflex       Respiratory    VIJI (obstructive sleep apnea)       Other    Morbid obesity with body mass index (BMI) of 40 0 or higher (HCC)    Relevant Orders    Comprehensive metabolic panel    CBC and differential      Other Visit Diagnoses     Annual physical exam    -  Primary    Need for influenza vaccination        Relevant Orders    influenza vaccine, quadrivalent, 0 5 mL, preservative-free, for adult and pediatric patients 6 mos+ (AFLURIA, FLUARIX, FLULAVAL, FLUZONE) (Completed)    Shift work sleep disorder        Relevant Medications    zolpidem (AMBIEN) 5 mg tablet          Immunizations and preventive care screenings were discussed with patient today  Appropriate education was printed on patient's after visit summary  Counseling:  Alcohol/drug use: discussed moderation in alcohol intake, the recommendations for healthy alcohol use, and avoidance of illicit drug use  Dental Health: discussed importance of regular tooth brushing, flossing, and dental visits  Injury prevention: discussed safety/seat belts, safety helmets, smoke detectors, carbon dioxide detectors, and smoking near bedding or upholstery  Sexual health: discussed sexually transmitted diseases, partner selection, use of condoms, avoidance of unintended pregnancy, and contraceptive alternatives  · Exercise: the importance of regular exercise/physical activity was discussed  Recommend exercise 3-5 times per week for at least 30 minutes  BMI Counseling: Body mass index is 38 39 kg/m²   The BMI is above normal  Nutrition recommendations include decreasing portion sizes and encouraging healthy choices of fruits and vegetables  Exercise recommendations include moderate physical activity 150 minutes/week  No pharmacotherapy was ordered  Rationale for BMI follow-up plan is due to patient being overweight or obese  No follow-ups on file  Chief Complaint:     Chief Complaint   Patient presents with    Physical Exam     Patient is here today for an annual exam       History of Present Illness:     Adult Annual Physical   Patient here for a comprehensive physical exam  The patient reports no problems  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 and he needs help with sleeping at times     Diet and Physical Activity  · Diet/Nutrition: well balanced diet and consuming 3-5 servings of fruits/vegetables daily  · Exercise: walking  Depression Screening  PHQ-9 Depression Screening    PHQ-9:   Frequency of the following problems over the past two weeks:           General Health  · Sleep: sleeps well and gets 7-8 hours of sleep on average  · Hearing: normal - bilateral   · Vision: goes for regular eye exams, most recent eye exam <1 year ago and wears glasses  · Dental: regular dental visits and brushes teeth twice daily   Health  · Symptoms include: none     Review of Systems:     Review of Systems   Constitutional: Negative  HENT: Negative  Eyes: Negative  Respiratory: Negative  Cardiovascular: Negative  Gastrointestinal: Negative  Endocrine: Negative  Genitourinary: Negative  Musculoskeletal: Negative  Skin: Negative  Allergic/Immunologic: Negative  Neurological: Negative  Hematological: Negative  Psychiatric/Behavioral: Negative         Past Medical History:     Past Medical History:   Diagnosis Date    Biallelic mutation of RYR1 gene     Bruxism 3/28/2018    Disease of thyroid gland     Hashimoto's thyroiditis     Hashimoto's thyroiditis     Incisional hernia, without obstruction or gangrene 1/22/2019    Added automatically from request for surgery [id number]    Pneumonia due to COVID-19 virus 11/15/2020    Positive Lyme disease serology     Right knee pain     last assessed - [id number]    Sleep apnea     Spermatocele     Status post laparoscopic hernia repair 4/82/9873    Umbilical hernia without obstruction or gangrene     last assessed - [id number]    Umbilical pain     resolved - [id number]    Umbilical swelling     resolved - [id number]    Varicose veins of both lower extremities     Ventral hernia without obstruction or gangrene     last assessed - [id number]      Past Surgical History:     Past Surgical History:   Procedure Laterality Date    MN ENDOVENOUS LASER, 1ST VEIN Right 9/26/2017    Procedure: GSV ENDOVASCULAR LASER THERAPY W/ MULTIPLE STAB PHLEBECTOMIES;  Surgeon: Ze Wilson MD;  [facility];  Service: Vascular    MN LAP, INCISIONAL HERNIA REPAIR,REDUCIBLE N/A 2/26/2019    Procedure: REPAIR HERNIA INCISIONAL LAPAROSCOPIC;  Surgeon: Doug Sosa MD;  [facility];  Service: General    SKIN SURGERY      MOLES REMOVED AS CHILD    TONSILLECTOMY      UMBILICAL HERNIA REPAIR      last assessed - [id number]    VENTRAL HERNIA REPAIR        Family History:     Family History   Problem Relation Age of Onset    Melanoma Mother     Hashimoto's thyroiditis Brother     Heart disease Family       Social History:     Social History     Socioeconomic History    Marital status: /Civil Union     Spouse name: None    Number of children: None    Years of education: None    Highest education level: None   Occupational History    None   Tobacco Use    Smoking status: Former Smoker    Smokeless tobacco: Never Used    Tobacco comment: stopped 8 years   Vaping Use    Vaping Use: Never used   Substance and Sexual Activity    Alcohol use:  Yes    Drug use: No    Sexual activity: Yes     Partners: Female   Other Topics Concern    None   Social History Narrative    Exercise habits         Social Determinants of Health     Financial Resource Strain:     Difficulty of Paying Living Expenses:    Food Insecurity:     Worried About Running Out of Food in the Last Year:     [address] in the Last Year:    Transportation Needs:     Lack of Transportation (Medical):  Lack of Transportation (Non-Medical):    Physical Activity:     Days of Exercise per Week:     Minutes of Exercise per Session:    Stress:     Feeling of Stress :    Social Connections:     Frequency of Communication with Friends and Family:     Frequency of Social Gatherings with Friends and Family:     Attends [employer] Services:     Active Member of Clubs or Organizations:     Attends Club or Organization Meetings:     Marital Status:    Intimate Partner Violence:     Fear of Current or Ex-Partner:     Emotionally Abused:     Physically Abused:     Sexually Abused:       Current Medications:     Current Outpatient Medications   Medication Sig Dispense Refill    levothyroxine 125 mcg tablet Take 1 tablet (125 mcg total) by mouth daily 90 tablet 0    zolpidem (AMBIEN) 5 mg tablet Take 1 tablet (5 mg total) by mouth daily at bedtime as needed for sleep 30 tablet 0     No current facility-administered medications for this visit  Allergies: Allergies   Allergen Reactions    Pollen Extract     Succinylcholine Other (See Comments)     Reaction: "malignant hyperthermia"      Physical Exam:     /76 (BP Location: Left arm, Patient Position: Sitting, Cuff Size: Large)   Pulse 89   Temp (!) 97 2 °F (36 2 °C) (Skin)   Resp 16   Ht 6' 1" (1 854 m)   Wt 132 kg (291 lb)   SpO2 96%   BMI 38 39 kg/m²     Physical Exam  Constitutional:       Appearance: He is well-developed  HENT:      Head: Normocephalic and atraumatic  Right Ear: Tympanic membrane normal  There is no impacted cerumen        Left Ear: Tympanic membrane normal  There is no impacted cerumen  Nose: Nose normal       Mouth/Throat:      Mouth: Mucous membranes are moist    Eyes:      Pupils: Pupils are equal, round, and reactive to light  Cardiovascular:      Rate and Rhythm: Normal rate and regular rhythm  Pulmonary:      Effort: Pulmonary effort is normal       Breath sounds: Normal breath sounds  Abdominal:      Palpations: Abdomen is soft  Musculoskeletal:         General: Normal range of motion  Cervical back: Normal range of motion and neck supple  Skin:     General: Skin is warm  Capillary Refill: Capillary refill takes less than 2 seconds  Neurological:      General: No focal deficit present  Mental Status: He is alert and oriented to person, place, and time     Psychiatric:         Mood and Affect: Mood normal          Behavior: Behavior normal           Annita Torre MD  [facility]

## 2021-09-24 NOTE — PATIENT INSTRUCTIONS

## 2021-10-01 DIAGNOSIS — E06.3 HASHIMOTO'S THYROIDITIS: ICD-10-CM

## 2021-10-01 RX ORDER — LEVOTHYROXINE SODIUM 0.12 MG/1
125 TABLET ORAL DAILY
Qty: 90 TABLET | Refills: 0 | Status: SHIPPED | OUTPATIENT
Start: 2021-10-01 | End: 2022-02-01 | Stop reason: SDUPTHER

## 2021-10-14 ENCOUNTER — IMMUNIZATIONS (OUTPATIENT)
Dept: FAMILY MEDICINE CLINIC | Facility: HOSPITAL | Age: 46
End: 2021-10-14

## 2021-10-14 DIAGNOSIS — Z23 ENCOUNTER FOR IMMUNIZATION: Primary | ICD-10-CM

## 2021-10-14 PROCEDURE — 0001A SARS-COV-2 / COVID-19 MRNA VACCINE (PFIZER-BIONTECH) 30 MCG: CPT

## 2021-10-14 PROCEDURE — 91300 SARS-COV-2 / COVID-19 MRNA VACCINE (PFIZER-BIONTECH) 30 MCG: CPT

## 2022-04-07 ENCOUNTER — APPOINTMENT (OUTPATIENT)
Dept: LAB | Facility: CLINIC | Age: 47
End: 2022-04-07
Payer: COMMERCIAL

## 2022-04-07 ENCOUNTER — APPOINTMENT (OUTPATIENT)
Dept: LAB | Facility: CLINIC | Age: 47
End: 2022-04-07

## 2022-04-07 DIAGNOSIS — E06.3 HASHIMOTO'S THYROIDITIS: ICD-10-CM

## 2022-04-07 DIAGNOSIS — Z00.8 HEALTH EXAMINATION IN POPULATION SURVEY: ICD-10-CM

## 2022-04-07 LAB
ALBUMIN SERPL BCP-MCNC: 4 G/DL (ref 3.5–5)
ALP SERPL-CCNC: 76 U/L (ref 46–116)
ALT SERPL W P-5'-P-CCNC: 63 U/L (ref 12–78)
ANION GAP SERPL CALCULATED.3IONS-SCNC: 10 MMOL/L (ref 4–13)
AST SERPL W P-5'-P-CCNC: 35 U/L (ref 5–45)
BASOPHILS # BLD AUTO: 0.03 THOUSANDS/ΜL (ref 0–0.1)
BASOPHILS NFR BLD AUTO: 0 % (ref 0–1)
BILIRUB SERPL-MCNC: 0.44 MG/DL (ref 0.2–1)
BUN SERPL-MCNC: 19 MG/DL (ref 5–25)
CALCIUM SERPL-MCNC: 8.9 MG/DL (ref 8.3–10.1)
CHLORIDE SERPL-SCNC: 103 MMOL/L (ref 100–108)
CHOLEST SERPL-MCNC: 185 MG/DL
CO2 SERPL-SCNC: 27 MMOL/L (ref 21–32)
CREAT SERPL-MCNC: 1.09 MG/DL (ref 0.6–1.3)
EOSINOPHIL # BLD AUTO: 0.2 THOUSAND/ΜL (ref 0–0.61)
EOSINOPHIL NFR BLD AUTO: 2 % (ref 0–6)
ERYTHROCYTE [DISTWIDTH] IN BLOOD BY AUTOMATED COUNT: 13.2 % (ref 11.6–15.1)
EST. AVERAGE GLUCOSE BLD GHB EST-MCNC: 117 MG/DL
GFR SERPL CREATININE-BSD FRML MDRD: 81 ML/MIN/1.73SQ M
GLUCOSE P FAST SERPL-MCNC: 96 MG/DL (ref 65–99)
HBA1C MFR BLD: 5.7 %
HCT VFR BLD AUTO: 41.9 % (ref 36.5–49.3)
HDLC SERPL-MCNC: 40 MG/DL
HGB BLD-MCNC: 14.7 G/DL (ref 12–17)
IMM GRANULOCYTES # BLD AUTO: 0.02 THOUSAND/UL (ref 0–0.2)
IMM GRANULOCYTES NFR BLD AUTO: 0 % (ref 0–2)
LDLC SERPL CALC-MCNC: 114 MG/DL (ref 0–100)
LYMPHOCYTES # BLD AUTO: 2.41 THOUSANDS/ΜL (ref 0.6–4.47)
LYMPHOCYTES NFR BLD AUTO: 29 % (ref 14–44)
MCH RBC QN AUTO: 30 PG (ref 26.8–34.3)
MCHC RBC AUTO-ENTMCNC: 35.1 G/DL (ref 31.4–37.4)
MCV RBC AUTO: 86 FL (ref 82–98)
MONOCYTES # BLD AUTO: 0.71 THOUSAND/ΜL (ref 0.17–1.22)
MONOCYTES NFR BLD AUTO: 9 % (ref 4–12)
NEUTROPHILS # BLD AUTO: 4.99 THOUSANDS/ΜL (ref 1.85–7.62)
NEUTS SEG NFR BLD AUTO: 60 % (ref 43–75)
NRBC BLD AUTO-RTO: 0 /100 WBCS
PLATELET # BLD AUTO: 204 THOUSANDS/UL (ref 149–390)
PMV BLD AUTO: 11.6 FL (ref 8.9–12.7)
POTASSIUM SERPL-SCNC: 4.2 MMOL/L (ref 3.5–5.3)
PROT SERPL-MCNC: 7.4 G/DL (ref 6.4–8.2)
RBC # BLD AUTO: 4.9 MILLION/UL (ref 3.88–5.62)
SODIUM SERPL-SCNC: 140 MMOL/L (ref 136–145)
T3FREE SERPL-MCNC: 2.94 PG/ML (ref 2.3–4.2)
T4 FREE SERPL-MCNC: 0.95 NG/DL (ref 0.76–1.46)
TRIGL SERPL-MCNC: 154 MG/DL
TSH SERPL DL<=0.05 MIU/L-ACNC: 5.35 UIU/ML (ref 0.45–4.5)
WBC # BLD AUTO: 8.36 THOUSAND/UL (ref 4.31–10.16)

## 2022-04-07 PROCEDURE — 83036 HEMOGLOBIN GLYCOSYLATED A1C: CPT

## 2022-04-07 PROCEDURE — 80061 LIPID PANEL: CPT

## 2022-04-07 PROCEDURE — 84443 ASSAY THYROID STIM HORMONE: CPT

## 2022-04-07 PROCEDURE — 36415 COLL VENOUS BLD VENIPUNCTURE: CPT

## 2022-04-07 PROCEDURE — 84481 FREE ASSAY (FT-3): CPT

## 2022-04-07 PROCEDURE — 85025 COMPLETE CBC W/AUTO DIFF WBC: CPT | Performed by: FAMILY MEDICINE

## 2022-04-07 PROCEDURE — 84439 ASSAY OF FREE THYROXINE: CPT

## 2022-04-07 PROCEDURE — 80053 COMPREHEN METABOLIC PANEL: CPT | Performed by: FAMILY MEDICINE

## 2022-08-26 ENCOUNTER — NURSE TRIAGE (OUTPATIENT)
Dept: OTHER | Facility: OTHER | Age: 47
End: 2022-08-26

## 2022-08-26 ENCOUNTER — TELEMEDICINE (OUTPATIENT)
Dept: FAMILY MEDICINE CLINIC | Facility: CLINIC | Age: 47
End: 2022-08-26
Payer: COMMERCIAL

## 2022-08-26 VITALS — TEMPERATURE: 98.4 F | OXYGEN SATURATION: 96 % | HEART RATE: 103 BPM

## 2022-08-26 DIAGNOSIS — U07.1 COVID-19: Primary | ICD-10-CM

## 2022-08-26 PROCEDURE — 99213 OFFICE O/P EST LOW 20 MIN: CPT | Performed by: FAMILY MEDICINE

## 2022-08-26 RX ORDER — NIRMATRELVIR AND RITONAVIR 300-100 MG
3 KIT ORAL 2 TIMES DAILY
Qty: 30 TABLET | Refills: 0 | Status: SHIPPED | OUTPATIENT
Start: 2022-08-26 | End: 2022-08-31

## 2022-08-26 NOTE — PROGRESS NOTES
COVID-19 Outpatient Progress Note    Assessment/Plan:    Problem List Items Addressed This Visit        Other    COVID-19 - Primary     Day 1 of illness  Fully vaccinated in boosted  Has had COVID in the past   Stable on exam with mild presentation  Interested antivirals  Discussed the indications, possible side effect, and contraindication  Agreed to start Paxlovid  Last GFR 81  In addition, recommend vitamin-D, vitamin-C, and zinc  Encouraged symptomatic management with good hydration, Tylenol/Motrin as needed for fever/myalgias, and rest  Discussed precautions including high fever, inability to maintain PO hydration, or respiratory distress   Follow-up on Monday             Relevant Medications    nirmatrelvir & ritonavir (Paxlovid, 300/100,) tablet therapy pack         Disposition:     Discussed symptom directed medication options with patient  Discussed vitamin D, vitamin C, and/or zinc supplementation with patient  Discussed risks, benefits, and side effects of inhaled corticosteroids and they agree to treatment  Patient meets criteria for PAXLOVID and they have been counseled appropriately according to EUA documentation released by the FDA  After discussion, patient agrees to treatment  [address] is an investigational medicine used to treat mild-to-moderate COVID-19 in adults and children (15years of age and older weighing at least 80 pounds (40 kg)) with positive results of direct SARS-CoV-2 viral testing, and who are at high risk for progression to severe COVID-19, including hospitalization or death  PAXLOVID is investigational because it is still being studied  There is limited information about the safety and effectiveness of using PAXLOVID to treat people with mild-to-moderate COVID-19      The FDA has authorized the emergency use of PAXLOVID for the treatment of mild-tomoderate COVID-19 in adults and children (15years of age and older weighing at least 80 pounds (40 kg)) with a positive test for the virus that causes COVID-19, and who are at high risk for progression to severe COVID-19, including hospitalization or death, under an EUA  What should I tell my healthcare provider before I take PAXLOVID? Tell your healthcare provider if you:  - Have any allergies  - Have liver or kidney disease  - Are pregnant or plan to become pregnant  - Are breastfeeding a child  - Have any serious illnesses    Tell your healthcare provider about all the medicines you take, including prescription and over-the-counter medicines, vitamins, and herbal supplements  Some medicines may interact with PAXLOVID and may cause serious side effects  Keep a list of your medicines to show your healthcare provider and pharmacist when you get a new medicine  You can ask your healthcare provider or pharmacist for a list of medicines that interact with PAXLOVID  Do not start taking a new medicine without telling your healthcare provider  Your healthcare provider can tell you if it is safe to take PAXLOVID with other medicines  Tell your healthcare provider if you are taking combined hormonal contraceptive  PAXLOVID may affect how your birth control pills work  Females who are able to become pregnant should use another effective alternative form of contraception or an additional barrier method of contraception  Talk to your healthcare provider if you have any questions about contraceptive methods that might be right for you  How do I take PAXLOVID? PAXLOVID consists of 2 medicines: nirmatrelvir and ritonavir  - Take 2 pink tablets of nirmatrelvir with 1 white tablet of ritonavir by mouth 2 times each day (in the morning and in the evening) for 5 days  For each dose, take all 3 tablets at the same time  - If you have kidney disease, talk to your healthcare provider  You may need a different dose  - Swallow the tablets whole  Do not chew, break, or crush the tablets  - Take PAXLOVID with or without food    - Do not stop taking PAXLOVID without talking to your healthcare provider, even if you feel better  - If you miss a dose of PAXLOVID within 8 hours of the time it is usually taken, take it as soon as you remember  If you miss a dose by more than 8 hours, skip the missed dose and take the next dose at your regular time  Do not take 2 doses of PAXLOVID at the same time  - If you take too much PAXLOVID, call your healthcare provider or go to the nearest hospital emergency room right away  - If you are taking a ritonavir- or cobicistat-containing medicine to treat hepatitis C or Human Immunodeficiency Virus (HIV), you should continue to take your medicine as prescribed by your healthcare provider   - Talk to your healthcare provider if you do not feel better or if you feel worse after 5 days  Who should generally not take PAXLOVID? Do not take PAXLOVID if:  You are allergic to nirmatrelvir, ritonavir, or any of the ingredients in PAXLOVID  You are taking any of the following medicines:  - Alfuzosin  - Pethidine, piroxicam, propoxyphene  - Ranolazine  - Amiodarone, dronedarone, flecainide, propafenone, quinidine  - Colchicine  - Lurasidone, pimozide, clozapine  - Dihydroergotamine, ergotamine, methylergonovine  - Lovastatin, simvastatin  - Sildenafil (Revatio®) for pulmonary arterial hypertension (PAH)  - Triazolam, oral midazolam  - Apalutamide  - Carbamazepine, phenobarbital, phenytoin  - Rifampin  - St  Wolfgangs Wort (hypericum perforatum)    What are the important possible side effects of PAXLOVID? Possible side effects of PAXLOVID are:  - Liver Problems  Tell your healthcare provider right away if you have any of these signs and symptoms of liver problems: loss of appetite, yellowing of your skin and the whites of eyes (jaundice), dark-colored urine, pale colored stools and itchy skin, stomach area (abdominal) pain  - Resistance to HIV Medicines   If you have untreated HIV infection, PAXLOVID may lead to some HIV medicines not working as well in the future  - Other possible side effects include: altered sense of taste, diarrhea, high blood pressure, or muscle aches    These are not all the possible side effects of PAXLOVID  Not many people have taken PAXLOVID  Serious and unexpected side effects may happen  [address] is still being studied, so it is possible that all of the risks are not known at this time  What other treatment choices are there? Like Alyssa Many may allow for the emergency use of other medicines to treat people with COVID-19  Go to https://[employer]/ for information on the emergency use of other medicines that are authorized by FDA to treat people with COVID-19  Your healthcare provider may talk with you about clinical trials for which you may be eligible  It is your choice to be treated or not to be treated with PAXLOVID  Should you decide not to receive it or for your child not to receive it, it will not change your standard medical care  What if I am pregnant or breastfeeding? There is no experience treating pregnant women or breastfeeding mothers with PAXLOVID  For a mother and unborn baby, the benefit of taking PAXLOVID may be greater than the risk from the treatment  If you are pregnant, discuss your options and specific situation with your healthcare provider  It is recommended that you use effective barrier contraception or do not have sexual activity while taking PAXLOVID  If you are breastfeeding, discuss your options and specific situation with your healthcare provider  How do I report side effects with PAXLOVID? Contact your healthcare provider if you have any side effects that bother you or do not go away      Report side effects to FDA MedWatch at www fda gov/medwatch or call 0-135-AVU7746 or you can report side effects to [facility]  at the contact information provided below  Website Fax number Telephone number   [employer] [phone]     How should I store Andi Pearson? Store PAXLOVID tablets at room temperature between 68°F to 77°F (20°C to 25°C)  Full fact sheet for patients, parents, and caregivers can be found at: http://[employer]/    I have spent 13 minutes directly with the patient  Encounter provider: Emmanuel Godfrey MD     Provider located at: [address] [address]     Recent Visits  No visits were found meeting these conditions  Showing recent visits within past 7 days and meeting all other requirements  Today's Visits  Date Type Provider Dept   08/26/22 Telemedicine Emmanuel Godfrey MD [facility] today's visits and meeting all other requirements  Future Appointments  No visits were found meeting these conditions  Showing future appointments within next 150 days and meeting all other requirements       Patient agrees to participate in a virtual check in via telephone or video visit instead of presenting to the office to address urgent/immediate medical needs  Patient is aware this is a billable service  He acknowledged consent and understanding of privacy and security of the video platform  The patient has agreed to participate and understands they can discontinue the visit at any time  After connecting through [facility], the patient was identified by name and date of birth  Dayna Cervantes was informed that this was a telemedicine visit and that the exam was being conducted confidentially over secure lines  My office door was closed  The patient was notified the following individuals were present in the room: Elizabeth Cervantes acknowledged consent and understanding of privacy and security of the telemedicine visit   I informed the patient that I have reviewed his record in [address] [place] [address] Rd and presented the opportunity for him to ask any questions regarding the visit today  The patient agreed to participate  Verification of patient location:  Patient is located in the following state in which I hold an active license: [place]    Subjective:   Kenny Bansal is a 40 y o  male who is concerned about COVID-19  Patient's symptoms include fatigue, nasal congestion, rhinorrhea, sore throat, cough, diarrhea and headache   Patient denies fever, anosmia, loss of taste, shortness of breath, chest tightness, abdominal pain, nausea and vomiting      - Date of symptom onset: 8/25/2022      COVID-19 vaccination status: Fully vaccinated with booster    Exposure:     Currently a healthcare worker that is involved in direct patient care?: Yes      Had COVID Nov 2020  Fully vaccinated and boosted   Appetite is poor but is drinking fluid   Temp 98 4, , Spo2 96%  Took mucinex with ibuprofen this morning    Lab Results   Component Value Date    SARSCOV2 Negative 09/01/2021    SARSCOV2 Detected (A) 11/07/2020     Past Medical History:   Diagnosis Date    Biallelic mutation of RYR1 gene     Bruxism 03/28/2018    COVID-19 08/26/2022    Disease of thyroid gland     Hashimoto's thyroiditis     Hashimoto's thyroiditis     Incisional hernia, without obstruction or gangrene 01/22/2019    Added automatically from request for surgery [id number]    Pneumonia due to COVID-19 virus 11/15/2020    Positive Lyme disease serology     Right knee pain     last assessed - [id number]    Sleep apnea     Spermatocele     Status post laparoscopic hernia repair 82/39/8875    Umbilical hernia without obstruction or gangrene     last assessed - [id number]    Umbilical pain     resolved - [id number]    Umbilical swelling     resolved - [id number]    Varicose veins of both lower extremities     Ventral hernia without obstruction or gangrene     last assessed - [id number]     Past Surgical History:   Procedure Laterality Date    SC ENDOVENOUS LASER, 1ST VEIN Right 9/26/2017    Procedure: GSV ENDOVASCULAR LASER THERAPY W/ MULTIPLE STAB PHLEBECTOMIES;  Surgeon: Rupali Evans MD;  [facility]: [facility];  Service: Vascular    SC LAP, INCISIONAL HERNIA REPAIR,REDUCIBLE N/A 2/26/2019    Procedure: REPAIR HERNIA INCISIONAL LAPAROSCOPIC;  Surgeon: Sun Lam MD;  Location: [facility];  Service: General    SKIN SURGERY      MOLES REMOVED AS CHILD    TONSILLECTOMY      UMBILICAL HERNIA REPAIR      last assessed - [id number]    VENTRAL HERNIA REPAIR       Current Outpatient Medications   Medication Sig Dispense Refill    levothyroxine 125 mcg tablet Take 1 tablet (125 mcg total) by mouth daily 90 tablet 3    nirmatrelvir & ritonavir (Paxlovid, 300/100,) tablet therapy pack Take 3 tablets by mouth 2 (two) times a day for 5 days Take 2 nirmatrelvir tablets + 1 ritonavir tablet together per dose 30 tablet 0    zolpidem (AMBIEN) 5 mg tablet Take 1 tablet (5 mg total) by mouth daily at bedtime as needed for sleep 30 tablet 0     No current facility-administered medications for this visit  Allergies   Allergen Reactions    Pollen Extract     Succinylcholine Other (See Comments)     Reaction: "malignant hyperthermia"       Review of Systems   Constitutional: Positive for fatigue  Negative for fever  HENT: Positive for congestion, rhinorrhea and sore throat  Respiratory: Positive for cough  Negative for chest tightness and shortness of breath  Chest congestion    Gastrointestinal: Positive for diarrhea  Negative for abdominal pain, nausea and vomiting  Neurological: Positive for dizziness and headaches  Objective:    Vitals:    08/26/22 1449   Pulse: 103   Temp: 98 4 °F (36 9 °C)   SpO2: 96%       Physical Exam  Vitals reviewed  Constitutional:       General: He is not in acute distress  Appearance: He is not ill-appearing  HENT:      Head: Normocephalic and atraumatic     Eyes:      Extraocular Movements: Extraocular movements intact  Pulmonary:      Effort: Pulmonary effort is normal       Breath sounds: Normal breath sounds  Skin:     Coloration: Skin is not pale  Neurological:      Mental Status: He is alert and oriented to person, place, and time  Psychiatric:         Mood and Affect: Mood normal          Behavior: Behavior normal          Thought Content:  Thought content normal

## 2022-08-26 NOTE — TELEPHONE ENCOUNTER
Patient called in to report home rapid test positive for Covid this morning  This is the patient's second occurrence with Covid  Requesting prescription for Paxlovid  VV scheduled with F at 2:30 PM today  Please follow up with patient prior to VV to instruct how he will connect with provider  Reason for Disposition   [1] COVID-19 diagnosed by positive lab test (e g , PCR, rapid self-test kit) AND [2] mild symptoms (e g , cough, fever, others) AND [5] no complications or SOB    Answer Assessment - Initial Assessment Questions  Were you within 6 feet or less, for up to 15 minutes or more with a person that has a confirmed COVID-19 test? 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 Home rapid test this morning 8/26/22    Are you experiencing any symptoms attributed to the virus?  (Assess for SOB, cough, fever, difficulty breathing) Cough, sore throat, runny nose, diarrhea, headache, body ache,     HIGH RISK: Do you have any history heart or lung conditions, weakened immune system, diabetes, Asthma, CHF, HIV, COPD, Chemo, renal failure, sickle cell, etc? Sleep with CPAP; second occurrence with Covid    PREGNANCY: Are you pregnant or did you recently give birth?  N/a    VACCINE: "Have you gotten the COVID-19 vaccine?" If Yes ask: "Which one, how many shots, when did you get it?" Pfizer x 2 shots plus 1 booster    Protocols used: CORONAVIRUS (COVID-19) DIAGNOSED OR SUSPECTED-ADULT-[place]

## 2022-08-26 NOTE — TELEPHONE ENCOUNTER
Regarding: COVID +  ----- Message from Nohemi Mariscal RN sent at 8/26/2022 10:33 AM EDT -----  "I tested positive for COVID today  I feel like I have a head cold   Would like to know what the doc would want me to do?"

## 2022-08-26 NOTE — PATIENT INSTRUCTIONS
It was nice meeting you  Recommend vitamin-C 2000 mg daily, vitamin-D 1000 internationally units daily, vitamin-C 50 mg daily  Have had test some information regarding the antiviral for you to reference  Hope you feel better soon  Talked to on Monday         FACT SHEET FOR PATIENTS, PARENTS, AND CAREGIVERS   EMERGENCY USE AUTHORIZATION (EUA) OF PAXLOVID FOR CORONAVIRUS DISEASE 2019 (COVID-19)   You are being given this Fact Sheet because your healthcare provider believes it is necessary to provide you with PAXLOVID for the treatment of mild-to-moderate coronavirus disease (COVID-19) caused by the SARS-CoV-2 virus  This Fact Sheet contains information to help you understand the risks and benefits of taking the PAXLOVID you have received or may receive  This Fact Sheet also contains information about how to take PAXLOVID and how to report side effects or problems with the appearance or packaging of PAXLOVID  The [place]  Food and Drug Administration (FDA) has issued an Emergency Use Authorization (EUA) to make PAXLOVID available during the COVID-19 pandemic (for more details about an EUA please see "What is an Emergency Use Authorization?" at the end of this document)  Fortino Spencer is not an FDA approved medicine in the [place]  Read this Fact Sheet for information about PAXLOVID  Talk to your healthcare provider about your options or if you have any questions  It is your choice to take PAXLOVID  What is COVID-19? COVID-19 is caused by a virus called a coronavirus  You can get COVID-19 through close contact with another person who has the virus  COVID-19 illnesses have ranged from very mild-to-severe, including illness resulting in death  While information so far suggests that most COVID-19 illness is mild, serious illness can happen and may cause some of your other medical conditions to become worse   Older people and people of all ages with severe, long lasting (chronic) medical conditions like heart disease, lung disease, and diabetes, for example seem to be at higher risk of being hospitalized for COVID-19  What is PAXLOVID? Jadine Ken is an investigational medicine used to treat mild-to-moderate COVID-19 in adults and children [15years of age and older weighing at least 80 pounds (36 kg)] with positive results of direct SARS-CoV-2 viral testing, and who are at high risk for progression to severe COVID-19, including hospitalization or death  PAXLOVID is investigational because it is still being studied  There is limited information about the safety and effectiveness of using PAXLOVID to treat people with mild-to-moderate COVID-19  The FDA has authorized the emergency use of PAXLOVID for the treatment of mild-to-moderate COVID-19 in adults and children [15years of age and older weighing at least 80 pounds (36 kg)] with a positive test for the virus that causes COVID-19, and who are at high risk for progression to severe COVID-19, including hospitalization or death, under an EUA  1   What should I tell my healthcare provider before I take PAXLOVID? Tell your healthcare provider if you:   ? Have any allergies ? Have liver or kidney disease ? Are pregnant or plan to become pregnant ? Are breastfeeding a child ? Have any serious illnesses   Some medicines may interact with PAXLOVID and may cause serious side effects  ? Tell your healthcare provider about all the medicines you take, including prescription and over-the-counter medicines, vitamins, and herbal supplements  ? Your healthcare provider can tell you if it is safe to take PAXLOVID with other medicines  ? You can ask your healthcare provider or pharmacist for a list of medicines that interact with PAXLOVID  ? Do not start taking a new medicine without telling your healthcare provider  Tell your healthcare provider if you are taking combined hormonal contraceptive  PAXLOVID may affect how your birth control pills work   Females who are able to become pregnant should use another effective alternative form of contraception or an additional barrier method of contraception  Talk to your healthcare provider if you have any questions about contraceptive methods that might be right for you  How do I take PAXLOVID? ? PAXLOVID consists of 2 medicines: nirmatrelvir tablets and ritonavir tablets  The 2 medicines are taken together 2 times each day for 5 days  Nirmatrelvir is an oval, pink tablet  Ritonavir is a white or off-white tablet  ? [address] is available in 2 Dose Packs (see Figures A and B below)  Your healthcare provider will prescribe the PAXLOVID Dose Pack that is right for you    ? If you have kidney disease, your healthcare provider may prescribe a lower dose (see Figure B)  Talk to your healthcare provider to make sure you receive the correct Dose Pack

## 2022-08-26 NOTE — ASSESSMENT & PLAN NOTE
Day 1 of illness  Fully vaccinated in boosted  Has had COVID in the past   Stable on exam with mild presentation  Interested antivirals  Discussed the indications, possible side effect, and contraindication  Agreed to start Paxlovid  Last GFR 81    In addition, recommend vitamin-D, vitamin-C, and zinc  Encouraged symptomatic management with good hydration, Tylenol/Motrin as needed for fever/myalgias, and rest  Discussed precautions including high fever, inability to maintain PO hydration, or respiratory distress   Follow-up on Monday

## 2022-08-29 ENCOUNTER — TELEMEDICINE (OUTPATIENT)
Dept: FAMILY MEDICINE CLINIC | Facility: CLINIC | Age: 47
End: 2022-08-29
Payer: COMMERCIAL

## 2022-08-29 DIAGNOSIS — U07.1 COVID-19: Primary | ICD-10-CM

## 2022-08-29 PROCEDURE — 99213 OFFICE O/P EST LOW 20 MIN: CPT | Performed by: FAMILY MEDICINE

## 2022-08-29 NOTE — ASSESSMENT & PLAN NOTE
Day 4 of illness  Fully vaccinated in boosted  Has had COVID in the past   Stable on exam with mild presentation  Started Paxlovid last Friday    Tolerating side effects  Continue vitamin-D, vitamin-C, and zinc  Requires 1 more day of isolation than 5 additional days of strict mass wearing  Return to work letter sent via my chart   Follow-up as needed

## 2022-08-29 NOTE — PROGRESS NOTES
COVID-19 Outpatient Progress Note    Assessment/Plan:    Problem List Items Addressed This Visit        Other    COVID-19 - Primary     Day 4 of illness  Fully vaccinated in boosted  Has had COVID in the past   Stable on exam with mild presentation  Started Paxlovid last Friday  Tolerating side effects  Continue vitamin-D, vitamin-C, and zinc  Requires 1 more day of isolation than 5 additional days of strict mass wearing  Return to work letter sent via my chart   Follow-up as needed               Disposition:     Patient has asymptomatic or mild COVID-19 infection  Based off CDC guidelines, they were recommended to isolate for 5 days  If they are asymptomatic or symptoms are improving with no fevers in the past 24 hours, isolation may be ended followed by 5 days of wearing a mask when around othes to minimize risk of infecting others  If still have a fever or other symptoms have not improved, continue to isolate until they improve  Regardless of when they end isolation, avoid being around people who are more likely to get very sick from COVID-19 until at least day 11  Discussed symptom directed medication options with patient  Discussed vitamin D, vitamin C, and/or zinc supplementation with patient  I have spent 8 minutes directly with the patient  Greater than 50% of this time was spent in counseling/coordination of care regarding: risks and benefits of treatment options, instructions for management, risk factor reductions and impressions         Encounter provider: Nakul Packer MD     Provider located at: [address] [address]     Recent Visits  Date Type Provider Dept   08/26/22 Telemedicine Nakul Packer MD [address] recent visits within past 7 days and meeting all other requirements  Today's Visits  Date Type Provider Dept   08/29/22 Telemedicine Nakul Packer MD [address] today's visits and meeting all other requirements  Future Appointments  No visits were found meeting these conditions  Showing future appointments within next 150 days and meeting all other requirements     This virtual check-in was done via [facility] and patient was informed that this is a secure, HIPAA-compliant platform  He agrees to proceed  Patient agrees to participate in a virtual check in via telephone or video visit instead of presenting to the office to address urgent/immediate medical needs  Patient is aware this is a billable service  He acknowledged consent and understanding of privacy and security of the video platform  The patient has agreed to participate and understands they can discontinue the visit at any time  After connecting through [facility], the patient was identified by name and date of birth  Anthony Kline was informed that this was a telemedicine visit and that the exam was being conducted confidentially over secure lines  Anthony Kline acknowledged consent and understanding of privacy and security of the telemedicine visit  I informed the patient that I have reviewed his record in Epic and presented the opportunity for him to ask any questions regarding the visit today  The patient agreed to participate  Verification of patient location:  Patient is located in the following state in which I hold an active license: [place]    Subjective:   Anthony Kline is a 40 y o  male who has been screened for COVID-19  Symptom change since last report: improving  Patient's symptoms include fever (subjective ever on Friday with chills  No fevers since ), fatigue (improving), nasal congestion (thick green discharge ), cough (dry cough ) and headache (mild )  Patient denies shortness of breath, chest tightness, abdominal pain, nausea, vomiting and diarrhea       - Date of symptom onset: 8/25/2022      COVID-19 vaccination status: Fully vaccinated with booster    Annaleejuanita Larkin has been staying home and has isolated themselves in his home  He is taking care to not share personal items and is cleaning all surfaces that are touched often, like counters, tabletops, and doorknobs using household cleaning sprays or wipes  He is wearing a mask when he leaves his room  Day 5 of illness and day 4 on Paxlovid   Tolerating medication  Does complain of altered taste and lightheadedness  Subjective temp on Friday  None since  Also notes difficulty concentrating  Has similar symptoms with the previous virus     Patient is a nurse within the network and is requesting a letter stating when he can return back to work  Improving overall    Lab Results   Component Value Date    SARSCOV2 Negative 09/01/2021    SARSCOV2 Detected (A) 11/07/2020     Past Medical History:   Diagnosis Date    Biallelic mutation of RYR1 gene     Bruxism 03/28/2018    COVID-19 08/26/2022    Disease of thyroid gland     Hashimoto's thyroiditis     Hashimoto's thyroiditis     Incisional hernia, without obstruction or gangrene 01/22/2019    Added automatically from request for surgery [id number]    Pneumonia due to COVID-19 virus 11/15/2020    Positive Lyme disease serology     Right knee pain     last assessed - [id number]    Sleep apnea     Spermatocele     Status post laparoscopic hernia repair 37/21/1385    Umbilical hernia without obstruction or gangrene     last assessed - [id number]    Umbilical pain     resolved - [id number]    Umbilical swelling     resolved - [id number]    Varicose veins of both lower extremities     Ventral hernia without obstruction or gangrene     last assessed - [id number]     Past Surgical History:   Procedure Laterality Date    [place] ENDOVENOUS LASER, 1ST VEIN Right 9/26/2017    Procedure: GSV ENDOVASCULAR LASER THERAPY W/ MULTIPLE STAB PHLEBECTOMIES;  Surgeon: Colten Wilson MD;  [facility];  Service: Vascular    MI LAP, INCISIONAL HERNIA REPAIR,REDUCIBLE N/A 2/26/2019    Procedure: REPAIR HERNIA INCISIONAL LAPAROSCOPIC;  Surgeon: Marjan Mckeon MD;  [facility];  Service: General    SKIN SURGERY      MOLES REMOVED AS CHILD    TONSILLECTOMY      UMBILICAL HERNIA REPAIR      last assessed - [id number]    VENTRAL HERNIA REPAIR       Current Outpatient Medications   Medication Sig Dispense Refill    levothyroxine 125 mcg tablet Take 1 tablet (125 mcg total) by mouth daily 90 tablet 3    nirmatrelvir & ritonavir (Paxlovid, 300/100,) tablet therapy pack Take 3 tablets by mouth 2 (two) times a day for 5 days Take 2 nirmatrelvir tablets + 1 ritonavir tablet together per dose 30 tablet 0    zolpidem (AMBIEN) 5 mg tablet Take 1 tablet (5 mg total) by mouth daily at bedtime as needed for sleep 30 tablet 0     No current facility-administered medications for this visit  Allergies   Allergen Reactions    Pollen Extract     Succinylcholine Other (See Comments)     Reaction: "malignant hyperthermia"       Review of Systems   Constitutional: Positive for fatigue (improving) and fever (subjective ever on Friday with chills  No fevers since )  HENT: Positive for congestion (thick green discharge )  Respiratory: Positive for cough (dry cough )  Negative for chest tightness and shortness of breath  Gastrointestinal: Negative for abdominal pain, diarrhea, nausea and vomiting  Neurological: Positive for headaches (mild )  Objective:    Vitals:       Physical Exam  Constitutional:       General: He is not in acute distress  Appearance: Normal appearance  HENT:      Head: Normocephalic and atraumatic  Eyes:      Extraocular Movements: Extraocular movements intact  Pulmonary:      Effort: Pulmonary effort is normal       Breath sounds: Normal breath sounds  Comments: No conversational dyspnea  Musculoskeletal:      Cervical back: No rigidity  Neurological:      Mental Status: He is alert and oriented to person, place, and time  Comments:  Answering questions appropriately Psychiatric:         Mood and Affect: Mood normal          Behavior: Behavior normal          Thought Content:  Thought content normal

## 2022-08-29 NOTE — LETTER
August 29, 2022    Patient: Daniel Thomas  YOB: 1975  Date of Last Encounter: 8/26/2022      To whom it may concern:     Daniel Thomas has tested positive for COVID-19 (Coronavirus)  He may return to work on 8/31/22, which is 5 days from illness onset (provided symptoms are improving) and 24 hours without fever    Patient instructed to wear a well fitted mask for 5 additional days ( til 9/6/22)  Sincerely,         Monica Gutierrez MD

## 2022-09-15 DIAGNOSIS — E06.3 HASHIMOTO'S THYROIDITIS: ICD-10-CM

## 2022-09-15 RX ORDER — LEVOTHYROXINE SODIUM 0.12 MG/1
125 TABLET ORAL DAILY
Qty: 90 TABLET | Refills: 0 | Status: SHIPPED | OUTPATIENT
Start: 2022-09-15 | End: 2022-09-27 | Stop reason: SDUPTHER

## 2022-09-27 ENCOUNTER — OFFICE VISIT (OUTPATIENT)
Dept: FAMILY MEDICINE CLINIC | Facility: CLINIC | Age: 47
End: 2022-09-27
Payer: COMMERCIAL

## 2022-09-27 VITALS
WEIGHT: 297.4 LBS | HEART RATE: 83 BPM | RESPIRATION RATE: 16 BRPM | TEMPERATURE: 96.5 F | BODY MASS INDEX: 39.42 KG/M2 | HEIGHT: 73 IN | DIASTOLIC BLOOD PRESSURE: 80 MMHG | OXYGEN SATURATION: 97 % | SYSTOLIC BLOOD PRESSURE: 120 MMHG

## 2022-09-27 DIAGNOSIS — E66.01 MORBID OBESITY WITH BODY MASS INDEX (BMI) OF 40.0 OR HIGHER (HCC): ICD-10-CM

## 2022-09-27 DIAGNOSIS — Z23 ENCOUNTER FOR IMMUNIZATION: ICD-10-CM

## 2022-09-27 DIAGNOSIS — E06.3 HASHIMOTO'S THYROIDITIS: ICD-10-CM

## 2022-09-27 DIAGNOSIS — M54.42 ACUTE LEFT-SIDED LOW BACK PAIN WITH LEFT-SIDED SCIATICA: ICD-10-CM

## 2022-09-27 DIAGNOSIS — G47.33 OSA (OBSTRUCTIVE SLEEP APNEA): ICD-10-CM

## 2022-09-27 DIAGNOSIS — Z12.11 ENCOUNTER FOR SCREENING COLONOSCOPY: ICD-10-CM

## 2022-09-27 DIAGNOSIS — Z00.00 ANNUAL PHYSICAL EXAM: Primary | ICD-10-CM

## 2022-09-27 PROBLEM — U07.1 COVID-19: Status: RESOLVED | Noted: 2022-08-26 | Resolved: 2022-09-27

## 2022-09-27 PROCEDURE — 99396 PREV VISIT EST AGE 40-64: CPT | Performed by: FAMILY MEDICINE

## 2022-09-27 PROCEDURE — 90686 IIV4 VACC NO PRSV 0.5 ML IM: CPT

## 2022-09-27 PROCEDURE — 90471 IMMUNIZATION ADMIN: CPT

## 2022-09-27 RX ORDER — LEVOTHYROXINE SODIUM 0.12 MG/1
125 TABLET ORAL DAILY
Qty: 90 TABLET | Refills: 0 | Status: SHIPPED | OUTPATIENT
Start: 2022-09-27

## 2022-09-27 RX ORDER — CYCLOBENZAPRINE HCL 10 MG
10 TABLET ORAL
Qty: 30 TABLET | Refills: 0 | Status: SHIPPED | OUTPATIENT
Start: 2022-09-27

## 2022-09-27 RX ORDER — METHYLPREDNISOLONE 4 MG/1
TABLET ORAL
Qty: 21 EACH | Refills: 0 | Status: SHIPPED | OUTPATIENT
Start: 2022-09-27

## 2022-09-27 NOTE — PATIENT INSTRUCTIONS

## 2022-09-27 NOTE — PROGRESS NOTES
[facility]    NAME: Jr Turcios  AGE: 40 y o  SEX: male  : 1975     DATE: 2022     Assessment and Plan:     Problem List Items Addressed This Visit        Endocrine    Hashimoto's thyroiditis/hypothyroidism     Due for labs            Relevant Medications    methylPREDNISolone 4 MG tablet therapy pack    levothyroxine 125 mcg tablet    Other Relevant Orders    TSH, 3rd generation with Free T4 reflex    Comprehensive metabolic panel    Lipid Panel with Direct LDL reflex       Respiratory    VIJI (obstructive sleep apnea)     CPAP machine at bedtime            Other    Morbid obesity with body mass index (BMI) of 40 0 or higher (HCC)     Diet and exercise encouraged today           Other Visit Diagnoses     Annual physical exam    -  Primary    Encounter for screening colonoscopy        Relevant Orders    Ambulatory referral for colonoscopy    Encounter for immunization        Relevant Orders    influenza vaccine, quadrivalent, 0 5 mL, preservative-free, for adult and pediatric patients 6 mos+ (AFLURIA, FLUARIX, FLULAVAL, FLUZONE)    Acute left-sided low back pain with left-sided sciatica        Relevant Medications    methylPREDNISolone 4 MG tablet therapy pack    cyclobenzaprine (FLEXERIL) 10 mg tablet          Immunizations and preventive care screenings were discussed with patient today  Appropriate education was printed on patient's after visit summary  Discussed risks and benefits of prostate cancer screening  We discussed the controversial history of PSA screening for prostate cancer in the [place] as well as the risk of over detection and over treatment of prostate cancer by way of PSA screening    The patient understands that PSA blood testing is an imperfect way to screen for prostate cancer and that elevated PSA levels in the blood may also be caused by infection, inflammation, prostatic trauma or manipulation, urological procedures, or by benign prostatic enlargement  The role of the digital rectal examination in prostate cancer screening was also discussed and I discussed with him that there is large interobserver variability in the findings of digital rectal examination  Counseling:  Alcohol/drug use: discussed moderation in alcohol intake, the recommendations for healthy alcohol use, and avoidance of illicit drug use  Dental Health: discussed importance of regular tooth brushing, flossing, and dental visits  Injury prevention: discussed safety/seat belts, safety helmets, smoke detectors, carbon dioxide detectors, and smoking near bedding or upholstery  Sexual health: discussed sexually transmitted diseases, partner selection, use of condoms, avoidance of unintended pregnancy, and contraceptive alternatives  Exercise: the importance of regular exercise/physical activity was discussed  Recommend exercise 3-5 times per week for at least 30 minutes  BMI Counseling: Body mass index is 39 03 kg/m²  The BMI is above normal  Nutrition recommendations include decreasing portion sizes and encouraging healthy choices of fruits and vegetables  Exercise recommendations include moderate physical activity 150 minutes/week  No pharmacotherapy was ordered  Rationale for BMI follow-up plan is due to patient being overweight or obese  No follow-ups on file  Chief Complaint:     Chief Complaint   Patient presents with    Physical Exam     Patient is here today for an annual exam       History of Present Illness:     Adult Annual Physical   Patient here for a comprehensive physical exam  The patient reports no problems  Left buttock pain radiating to thigh for 6 months  Left low back pain started 2 weeks ago       Diet and Physical Activity  Diet/Nutrition: well balanced diet and consuming 3-5 servings of fruits/vegetables daily  Exercise: no formal exercise        Depression Screening  PHQ-2/9 Depression Screening         General Health  Sleep: sleeps well  Hearing: normal - bilateral   Vision: goes for regular eye exams and wears glasses  Dental: regular dental visits and brushes teeth twice daily   Health  Symptoms include: none     Review of Systems:     Review of Systems   Constitutional: Negative  HENT: Negative  Eyes: Negative  Respiratory: Negative  Cardiovascular: Negative  Gastrointestinal: Negative  Endocrine: Negative  Genitourinary: Negative  Musculoskeletal: Negative  Skin: Negative  Allergic/Immunologic: Negative  Neurological: Negative  Hematological: Negative  Psychiatric/Behavioral: Negative         Past Medical History:     Past Medical History:   Diagnosis Date    Biallelic mutation of RYR1 gene     Bruxism 03/28/2018    COVID-19 08/26/2022    Disease of thyroid gland     Hashimoto's thyroiditis     Hashimoto's thyroiditis     Incisional hernia, without obstruction or gangrene 01/22/2019    Added automatically from request for surgery [id number]    Pneumonia due to COVID-19 virus 11/15/2020    Positive Lyme disease serology     Right knee pain     last assessed - [id number]    Sleep apnea     Spermatocele     Status post laparoscopic hernia repair 70/81/3327    Umbilical hernia without obstruction or gangrene     last assessed - [id number]    Umbilical pain     resolved - [id number]    Umbilical swelling     resolved - [id number]    Varicose veins of both lower extremities     Ventral hernia without obstruction or gangrene     last assessed - [id number]      Past Surgical History:     Past Surgical History:   Procedure Laterality Date    [place] ENDOVENOUS LASER, 1ST VEIN Right 9/26/2017    Procedure: GSV ENDOVASCULAR LASER THERAPY W/ MULTIPLE STAB PHLEBECTOMIES;  Surgeon: Surjit Wilson MD;  [facility];  Service: Vascular    [place] LAP, INCISIONAL HERNIA REPAIR,REDUCIBLE N/A 2/26/2019    Procedure: REPAIR HERNIA INCISIONAL LAPAROSCOPIC;  Surgeon: Deacon Gonzalez MD;  [facility];  Service: General    SKIN SURGERY      MOLES REMOVED AS CHILD    TONSILLECTOMY      UMBILICAL HERNIA REPAIR      last assessed - [id number]    VENTRAL HERNIA REPAIR        Family History:     Family History   Problem Relation Age of Onset   [facility] Melanoma Mother     Hashimoto's thyroiditis Brother     Heart disease Family       Social History:     Social History     Socioeconomic History    Marital status: /Civil Union     Spouse name: None    Number of children: None    Years of education: None    Highest education level: None   Occupational History    None   Tobacco Use    Smoking status: Former Smoker    Smokeless tobacco: Never Used    Tobacco comment: stopped 8 years   Vaping Use    Vaping Use: Never used   Substance and Sexual Activity    Alcohol use: Yes    Drug use: No    Sexual activity: Yes     Partners: Female   Other Topics Concern    None   Social History Narrative    Exercise habits         Social Determinants of Health     Financial Resource Strain: Not on file   Food Insecurity: Not on file   Transportation Needs: Not on file   Physical Activity: Not on file   Stress: Not on file   Social Connections: Not on file   Intimate Partner Violence: Not on file   Housing Stability: Not on file      Current Medications:     Current Outpatient Medications   Medication Sig Dispense Refill    cyclobenzaprine (FLEXERIL) 10 mg tablet Take 1 tablet (10 mg total) by mouth daily at bedtime 30 tablet 0    levothyroxine 125 mcg tablet Take 1 tablet (125 mcg total) by mouth daily 90 tablet 0    methylPREDNISolone 4 MG tablet therapy pack Use as directed on package 21 each 0    zolpidem (AMBIEN) 5 mg tablet Take 1 tablet (5 mg total) by mouth daily at bedtime as needed for sleep 30 tablet 0     No current facility-administered medications for this visit  Allergies:      Allergies   Allergen Reactions    Pollen Extract     Succinylcholine Other (See Comments)     Reaction: "malignant hyperthermia"      Physical Exam:     /80 (BP Location: Left arm, Patient Position: Sitting, Cuff Size: Large)   Pulse 83   Temp (!) 96 5 °F (35 8 °C) (Tympanic)   Resp 16   Ht 6' 1 19" (1 859 m)   Wt 135 kg (297 lb 6 4 oz)   SpO2 97%   BMI 39 03 kg/m²     Physical Exam  Vitals and nursing note reviewed  Constitutional:       Appearance: Normal appearance  He is well-developed  HENT:      Head: Normocephalic and atraumatic  Right Ear: Tympanic membrane normal       Left Ear: Tympanic membrane normal       Nose: Nose normal       Mouth/Throat:      Mouth: Mucous membranes are moist    Eyes:      Pupils: Pupils are equal, round, and reactive to light  Cardiovascular:      Rate and Rhythm: Normal rate and regular rhythm  Pulmonary:      Effort: Pulmonary effort is normal       Breath sounds: Normal breath sounds  Abdominal:      Palpations: Abdomen is soft  Musculoskeletal:         General: Normal range of motion  Cervical back: Normal range of motion and neck supple  Skin:     General: Skin is warm  Capillary Refill: Capillary refill takes less than 2 seconds  Neurological:      Mental Status: He is alert and oriented to person, place, and time     Psychiatric:         Behavior: Behavior normal           Bora Montano MD  [facility]

## 2022-10-17 DIAGNOSIS — E66.01 MORBID OBESITY WITH BODY MASS INDEX (BMI) OF 40.0 OR HIGHER (HCC): Primary | ICD-10-CM

## 2022-11-01 ENCOUNTER — OFFICE VISIT (OUTPATIENT)
Dept: PODIATRY | Facility: CLINIC | Age: 47
End: 2022-11-01

## 2022-11-01 VITALS
SYSTOLIC BLOOD PRESSURE: 150 MMHG | WEIGHT: 301.8 LBS | HEART RATE: 72 BPM | HEIGHT: 73 IN | DIASTOLIC BLOOD PRESSURE: 101 MMHG | BODY MASS INDEX: 40 KG/M2

## 2022-11-01 DIAGNOSIS — M21.6X2 PRONATION OF BOTH FEET: ICD-10-CM

## 2022-11-01 DIAGNOSIS — M21.6X1 PRONATION OF BOTH FEET: ICD-10-CM

## 2022-11-01 DIAGNOSIS — M21.619 BUNION: Primary | ICD-10-CM

## 2022-11-01 NOTE — LETTER
November 2, 2022     Mackenzie Leung MD  [address] [address]    Patient: Jeffry Kenney   YOB: 1975   Date of Visit: 11/1/2022       Dear Dr Costa Reveal:    Thank you for referring Jeffry Kenney to me for evaluation  Below are my notes for this consultation  If you have questions, please do not hesitate to call me  I look forward to following your patient along with you  Sincerely,        Jason Reza DPM        CC: No Recipients  Rahul Crump  11/1/2022  9:13 PM  Signed  Assessment/Plan:    The patient's clinical examination today is consistent with a tender tailor's bunionette to the lateral 5th metatarsophalangeal joint  Treatment options were discussed with the patient to include accommodative shoe gear, padding/splinting, or surgical intervention  He does have a pronated foot type noted on stance with eversion of the heels  He has no specific complaints of tenderness or pain to either foot  Recommend semi-rigid over-the-counter arch supports such as Powerstep or Superfeet to control pronation bilaterally  Custom orthotics are also an option if over-the-counter products fail  Recommend good supportive shoes with a good stiff mid sole  Some stretching exercises were also provided to in his AVS for stretching of the heel cords and to strengthen the intrinsic muscles of his feet  He will follow-up on an as needed basis  Diagnoses and all orders for this visit:    Bunion    Pronation of both feet          Subjective:      Patient ID: Jeffry Kenney is a 40 y o  male  The patient presents today for his initial consultation with [address] [place] [address] for general podiatric exam   He states he has no immediate concerns regarding his feet there is no significant issues that are causing any pain or discomfort to either foot    He does note some occasional tenderness to the outside of his left foot and 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  He does note good comfort when wearing his [place] sandals        The following portions of the patient's history were reviewed and updated as appropriate: allergies, current medications, past family history, past medical history, past social history, past surgical history and problem list       PAST MEDICAL HISTORY:  Past Medical History:   Diagnosis Date   • Biallelic mutation of RYR1 gene    • Bruxism 03/28/2018   • COVID-19 08/26/2022   • Disease of thyroid gland    • Hashimoto's thyroiditis    • Hashimoto's thyroiditis    • Incisional hernia, without obstruction or gangrene 01/22/2019    Added automatically from request for surgery [id number]   • Pneumonia due to COVID-19 virus 11/15/2020   • Positive Lyme disease serology    • Right knee pain     last assessed - [id number]   • Sleep apnea    • Spermatocele    • Status post laparoscopic hernia repair 94/99/5517   • Umbilical hernia without obstruction or gangrene     last assessed - [id number]   • Umbilical pain     resolved - [id number]   • Umbilical swelling     resolved - [id number]   • Varicose veins of both lower extremities    • Ventral hernia without obstruction or gangrene     last assessed - [id number]       PAST SURGICAL HISTORY:  Past Surgical History:   Procedure Laterality Date   • MS ENDOVENOUS LASER, 1ST VEIN Right 9/26/2017    Procedure: GSV ENDOVASCULAR LASER THERAPY W/ MULTIPLE STAB PHLEBECTOMIES;  Surgeon: Amina Wilson MD;  [facility];  Service: Vascular   • MS LAP, INCISIONAL HERNIA REPAIR,REDUCIBLE N/A 2/26/2019    Procedure: REPAIR HERNIA INCISIONAL LAPAROSCOPIC;  Surgeon: Rosenda Cevallos MD;  [facility];  Service: General   • SKIN SURGERY      MOLES REMOVED AS CHILD   • TONSILLECTOMY     • UMBILICAL HERNIA REPAIR      last assessed - [id number]   • VENTRAL HERNIA REPAIR          ALLERGIES:  Pollen extract and Succinylcholine    MEDICATIONS:  Current Outpatient Medications   Medication Sig Dispense Refill   • Semaglutide-Weight Management (WEGOVY) 0 25 MG/0 5ML Inject 0 5 mL (0 25 mg total) under the skin once a week for 28 days 2 mL 0   • cyclobenzaprine (FLEXERIL) 10 mg tablet Take 1 tablet (10 mg total) by mouth daily at bedtime (Patient not taking: Reported on 11/1/2022) 30 tablet 0   • levothyroxine 125 mcg tablet Take 1 tablet (125 mcg total) by mouth daily 90 tablet 0   • methylPREDNISolone 4 MG tablet therapy pack Use as directed on package (Patient not taking: Reported on 11/1/2022) 21 each 0   • zolpidem (AMBIEN) 5 mg tablet Take 1 tablet (5 mg total) by mouth daily at bedtime as needed for sleep (Patient not taking: Reported on 11/1/2022) 30 tablet 0     No current facility-administered medications for this visit  SOCIAL HISTORY:  Social History     Socioeconomic History   • Marital status: /Civil Union     Spouse name: None   • Number of children: None   • Years of education: None   • Highest education level: None   Occupational History   • None   Tobacco Use   • Smoking status: Former Smoker   • Smokeless tobacco: Never Used   • Tobacco comment: stopped 8 years   Vaping Use   • Vaping Use: Never used   Substance and Sexual Activity   • Alcohol use: Yes   • Drug use: No   • Sexual activity: Yes     Partners: Female   Other Topics Concern   • None   Social History Narrative    Exercise habits         Social Determinants of Health     Financial Resource Strain: Not on file   Food Insecurity: Not on file   Transportation Needs: Not on file   Physical Activity: Not on file   Stress: Not on file   Social Connections: Not on file   Intimate Partner Violence: Not on file   Housing Stability: Not on file        Review of Systems   Constitutional: Negative for chills and fever  HENT: Negative for ear pain and sore throat  Eyes: Negative for pain and visual disturbance  Respiratory: Negative for cough and shortness of breath      Cardiovascular: Negative for chest pain and palpitations  Gastrointestinal: Negative for abdominal pain and vomiting  Musculoskeletal: Negative for arthralgias and back pain  Skin: Negative for color change and rash  Neurological: Negative for seizures and syncope  Psychiatric/Behavioral: Negative  All other systems reviewed and are negative  Objective:      BP (!) 150/101   Pulse 72   Ht 6' 1" (1 854 m) Comment: verbal  Wt (!) 137 kg (301 lb 12 8 oz)   BMI 39 82 kg/m²          Physical Exam  Vitals reviewed  Constitutional:       Appearance: Normal appearance  HENT:      Head: Normocephalic and atraumatic  Nose: Nose normal    Eyes:      Conjunctiva/sclera: Conjunctivae normal       Pupils: Pupils are equal, round, and reactive to light  Cardiovascular:      Pulses:           Dorsalis pedis pulses are 2+ on the right side and 2+ on the left side  Posterior tibial pulses are 2+ on the right side and 2+ on the left side  Pulmonary:      Effort: Pulmonary effort is normal    Feet:      Right foot:      Skin integrity: Skin integrity normal       Left foot:      Skin integrity: Skin integrity normal       Comments: There is a tailor's bunionette deformity to the left foot with mild erythema over the dorsal lateral bony prominence of the 5th metatarsal head and neck; there is mild tenderness to palpation with no open lesions; pronation of the hindfoot is noted on stance with eversion of the heels bilateral  Skin:     General: Skin is warm  Capillary Refill: Capillary refill takes less than 2 seconds  Neurological:      General: No focal deficit present  Mental Status: He is alert and oriented to person, place, and time  Psychiatric:         Mood and Affect: Mood normal          Behavior: Behavior normal          Thought Content:  Thought content normal

## 2022-11-01 NOTE — PROGRESS NOTES
Assessment/Plan:    The patient's clinical examination today is consistent with a tender tailor's bunionette to the lateral 5th metatarsophalangeal joint  Treatment options were discussed with the patient to include accommodative shoe gear, padding/splinting, or surgical intervention  He does have a pronated foot type noted on stance with eversion of the heels  He has no specific complaints of tenderness or pain to either foot  Recommend semi-rigid over-the-counter arch supports such as Powerstep or Superfeet to control pronation bilaterally  Custom orthotics are also an option if over-the-counter products fail  Recommend good supportive shoes with a good stiff mid sole  Some stretching exercises were also provided to in his AVS for stretching of the heel cords and to strengthen the intrinsic muscles of his feet  He will follow-up on an as needed basis  Diagnoses and all orders for this visit:    Bunion    Pronation of both feet          Subjective:      Patient ID: Jeffry Kenney is a 40 y o  male  The patient presents today for his initial consultation with [address] [place] [address] for general podiatric exam   He states he has no immediate concerns regarding his feet there is no significant issues that are causing any pain or discomfort to either foot  He does note some occasional tenderness to the outside of his left foot and 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  He does note good comfort when wearing his [place] sandals        The following portions of the patient's history were reviewed and updated as appropriate: allergies, current medications, past family history, past medical history, past social history, past surgical history and problem list       PAST MEDICAL HISTORY:  Past Medical History:   Diagnosis Date   • Biallelic mutation of RYR1 gene    • Bruxism 03/28/2018   • COVID-19 08/26/2022   • Disease of thyroid gland    • Hashimoto's thyroiditis    • Hashimoto's thyroiditis    • Incisional hernia, without obstruction or gangrene 01/22/2019    Added automatically from request for surgery [id number]   • Pneumonia due to COVID-19 virus 11/15/2020   • Positive Lyme disease serology    • Right knee pain     last assessed - 24Oct2013   • Sleep apnea    • Spermatocele    • Status post laparoscopic hernia repair 22/94/6046   • Umbilical hernia without obstruction or gangrene     last assessed - [id number]   • Umbilical pain     resolved - [id number]   • Umbilical swelling     resolved - [id number]   • Varicose veins of both lower extremities    • Ventral hernia without obstruction or gangrene     last assessed - [id number]       PAST SURGICAL HISTORY:  Past Surgical History:   Procedure Laterality Date   • WI ENDOVENOUS LASER, 1ST VEIN Right 9/26/2017    Procedure: GSV ENDOVASCULAR LASER THERAPY W/ MULTIPLE STAB PHLEBECTOMIES;  Surgeon: Yana Wilson MD;  [facility];  Service: Vascular   • WI LAP, INCISIONAL HERNIA REPAIR,REDUCIBLE N/A 2/26/2019    Procedure: REPAIR HERNIA INCISIONAL LAPAROSCOPIC;  Surgeon: Gabriel Sofia MD;  [facility];  Service: General   • SKIN SURGERY      MOLES REMOVED AS CHILD   • TONSILLECTOMY     • Purje 69      last assessed - [id number]   • VENTRAL HERNIA REPAIR          ALLERGIES:  Pollen extract and Succinylcholine    MEDICATIONS:  Current Outpatient Medications   Medication Sig Dispense Refill   • Semaglutide-Weight Management (WEGOVY) 0 25 MG/0 5ML Inject 0 5 mL (0 25 mg total) under the skin once a week for 28 days 2 mL 0   • cyclobenzaprine (FLEXERIL) 10 mg tablet Take 1 tablet (10 mg total) by mouth daily at bedtime (Patient not taking: Reported on 11/1/2022) 30 tablet 0   • levothyroxine 125 mcg tablet Take 1 tablet (125 mcg total) by mouth daily 90 tablet 0   • methylPREDNISolone 4 MG tablet therapy pack Use as directed on package (Patient not taking: Reported on 11/1/2022) 21 each 0   • zolpidem (AMBIEN) 5 mg tablet Take 1 tablet (5 mg total) by mouth daily at bedtime as needed for sleep (Patient not taking: Reported on 11/1/2022) 30 tablet 0     No current facility-administered medications for this visit  SOCIAL HISTORY:  Social History     Socioeconomic History   • Marital status: /Civil Union     Spouse name: None   • Number of children: None   • Years of education: None   • Highest education level: None   Occupational History   • None   Tobacco Use   • Smoking status: Former Smoker   • Smokeless tobacco: Never Used   • Tobacco comment: stopped 8 years   Vaping Use   • Vaping Use: Never used   Substance and Sexual Activity   • Alcohol use: Yes   • Drug use: No   • Sexual activity: Yes     Partners: Female   Other Topics Concern   • None   Social History Narrative    Exercise habits         Social Determinants of Health     Financial Resource Strain: Not on file   Food Insecurity: Not on file   Transportation Needs: Not on file   Physical Activity: Not on file   Stress: Not on file   Social Connections: Not on file   Intimate Partner Violence: Not on file   Housing Stability: Not on file        Review of Systems   Constitutional: Negative for chills and fever  HENT: Negative for ear pain and sore throat  Eyes: Negative for pain and visual disturbance  Respiratory: Negative for cough and shortness of breath  Cardiovascular: Negative for chest pain and palpitations  Gastrointestinal: Negative for abdominal pain and vomiting  Musculoskeletal: Negative for arthralgias and back pain  Skin: Negative for color change and rash  Neurological: Negative for seizures and syncope  Psychiatric/Behavioral: Negative  All other systems reviewed and are negative  Objective:      BP (!) 150/101   Pulse 72   Ht 6' 1" (1 854 m) Comment: verbal  Wt (!) 137 kg (301 lb 12 8 oz)   BMI 39 82 kg/m²          Physical Exam  Vitals reviewed     Constitutional: Appearance: Normal appearance  HENT:      Head: Normocephalic and atraumatic  Nose: Nose normal    Eyes:      Conjunctiva/sclera: Conjunctivae normal       Pupils: Pupils are equal, round, and reactive to light  Cardiovascular:      Pulses:           Dorsalis pedis pulses are 2+ on the right side and 2+ on the left side  Posterior tibial pulses are 2+ on the right side and 2+ on the left side  Pulmonary:      Effort: Pulmonary effort is normal    Feet:      Right foot:      Skin integrity: Skin integrity normal       Left foot:      Skin integrity: Skin integrity normal       Comments: There is a tailor's bunionette deformity to the left foot with mild erythema over the dorsal lateral bony prominence of the 5th metatarsal head and neck; there is mild tenderness to palpation with no open lesions; pronation of the hindfoot is noted on stance with eversion of the heels bilateral  Skin:     General: Skin is warm  Capillary Refill: Capillary refill takes less than 2 seconds  Neurological:      General: No focal deficit present  Mental Status: He is alert and oriented to person, place, and time  Psychiatric:         Mood and Affect: Mood normal          Behavior: Behavior normal          Thought Content:  Thought content normal

## 2022-12-01 ENCOUNTER — APPOINTMENT (OUTPATIENT)
Dept: LAB | Facility: CLINIC | Age: 47
End: 2022-12-01

## 2022-12-01 DIAGNOSIS — E06.3 HASHIMOTO'S THYROIDITIS: ICD-10-CM

## 2022-12-01 LAB
ALBUMIN SERPL BCP-MCNC: 4.5 G/DL (ref 3.5–5)
ALP SERPL-CCNC: 69 U/L (ref 34–104)
ALT SERPL W P-5'-P-CCNC: 38 U/L (ref 7–52)
ANION GAP SERPL CALCULATED.3IONS-SCNC: 7 MMOL/L (ref 4–13)
AST SERPL W P-5'-P-CCNC: 27 U/L (ref 13–39)
BILIRUB SERPL-MCNC: 0.54 MG/DL (ref 0.2–1)
BUN SERPL-MCNC: 14 MG/DL (ref 5–25)
CALCIUM SERPL-MCNC: 9.2 MG/DL (ref 8.4–10.2)
CHLORIDE SERPL-SCNC: 103 MMOL/L (ref 96–108)
CHOLEST SERPL-MCNC: 191 MG/DL
CO2 SERPL-SCNC: 27 MMOL/L (ref 21–32)
CREAT SERPL-MCNC: 1.02 MG/DL (ref 0.6–1.3)
GFR SERPL CREATININE-BSD FRML MDRD: 87 ML/MIN/1.73SQ M
GLUCOSE P FAST SERPL-MCNC: 100 MG/DL (ref 65–99)
HDLC SERPL-MCNC: 46 MG/DL
LDLC SERPL CALC-MCNC: 103 MG/DL (ref 0–100)
POTASSIUM SERPL-SCNC: 4.5 MMOL/L (ref 3.5–5.3)
PROT SERPL-MCNC: 7.2 G/DL (ref 6.4–8.4)
SODIUM SERPL-SCNC: 137 MMOL/L (ref 135–147)
TRIGL SERPL-MCNC: 210 MG/DL
TSH SERPL DL<=0.05 MIU/L-ACNC: 4.12 UIU/ML (ref 0.45–4.5)

## 2023-01-23 DIAGNOSIS — E66.01 MORBID OBESITY WITH BODY MASS INDEX (BMI) OF 40.0 OR HIGHER (HCC): Primary | ICD-10-CM

## 2023-01-26 DIAGNOSIS — E06.3 HASHIMOTO'S THYROIDITIS: ICD-10-CM

## 2023-01-26 RX ORDER — LEVOTHYROXINE SODIUM 0.12 MG/1
125 TABLET ORAL DAILY
Qty: 90 TABLET | Refills: 0 | Status: SHIPPED | OUTPATIENT
Start: 2023-01-26

## 2023-02-17 ENCOUNTER — AMB VIDEO VISIT (OUTPATIENT)
Dept: OTHER | Facility: HOSPITAL | Age: 48
End: 2023-02-17

## 2023-02-17 NOTE — CARE ANYWHERE EVISITS
Visit Summary for Kyle LEA - Gender: Male - Date of Birth: [id number]  Date: [id number] - Duration: 3 minutes  Patient: Kyle LEA  Provider: Sissy Mandujano    Patient Contact Information  Address  [place]; [address]   [phone]    Visit Topics  Covid+    Paxlovid? [Added Kenya Victoria - [id number]]    Triage Questions   What is your current physical address in the event of a medical emergency? Answer []  Are you allergic to any medications? Answer []  Are you now or could you be pregnant? Answer []  Do you have any immune system compromise or chronic lung   disease? Answer []  Do you have any vulnerable family members in the home (infant, pregnant, cancer, elderly)? Answer []     Conversation Transcripts  [0A][0A] [Notification] You are connected with Jared Renee, Adult Medicine [0A][Notification] Aixa Martin is located in [place]  [0A][Notification] Aixa Martin has shared health history  Frutoso Spatz  [0A][Notification] Sissy Mandujano has added a   prescription [0A][Notification] Jared Renee has added a diagnosis/procedure code  [0A]    Diagnosis  COVID-19    Procedures    Medications Prescribed    Paxlovid (EUA)  Dose : 3 tablets  Route : oral  Frequency : 2 times a day  Refills : 0  Instructions to the Pharmacist : Substitutions allowed      Provider Notes  [0A][0A] [0A]Clinician has verified patient location and identification[0A][_] If patient is a minor parent/guardian and patient are both present  [0A]Mode of Communication: [0A][0A][_] Phone[0A][0A][x] Video[0A]History of Present Illness[0A][0A]49 yrs   old male called for COVID 19   the patient has third episode of covid 23  The patient denies fever and chills  the patient also have body ache, sinus issue but no sob  [0A]Date of symptom onset: 2[0A]Date of suspected exposure (if known):   none[0A]Pertinent symptoms[0A][0A]Body aches? [0A][0A][x] Yes  [_] No[0A][0A]Comments: [0A][0A]Headache?[0A][0A][x] Yes  [_] No[0A][0A]Comments: [0A][0A]Fever?[0A][0A][x] Yes  [_] No[0A][0A]Comments: [0A][0A]Cough? [0A][0A][x] Yes  [_] No[0A][0A]Comments   (describe if present): [0A][0A]Shortness of breath? [0A][0A][_] Yes  [x] No[0A][0A]Comments: [0A][0A]Chest pain or tightness?[0A][0A][_] Yes  [x] No[0A][0A]Comments: [0A][0A]Sore throat? [0A][0A][_] Yes  [x] No[0A][0A]Comments: [0A][0A]Lost sense of smell   or taste? [0A][0A][_] Yes  [x] No[0A][0A]Comments: [0A][0A]Runny nose or congestion?[0A][0A][x] Yes  [_] No[0A][0A]Comments: [0A][0A]Sneezing?[0A][0A][x] Yes  [_] No[0A][0A]Comments: [0A][0A]Nausea or vomiting?[0A][0A][_] Yes  [x] No[0A][0A]Comments:   [0A][0A]Diarrhea or loose stool?[0A][0A][_] Yes  [x] No[0A][0A]Comments: [0A][0A]Other?[0A][0A][_] Yes  [x] No[0A][0A]Comments: [0A]Risk factors[0A][0A]Has the patient been tested within the last 3 months for SARS-Co-V2 virus? [0A][0A][_] Yes  [x]   No[0A][0A]Comments: [0A][0A]Has the patient been FULLY vaccinated against the SARS-Co-V2 virus? [0A][0A][x] Yes  [_] No[0A][0A]Comments: [0A][0A]History of active significant chronic heart or lung disease?  [0A][0A][x] Yes  [_] No[0A][0A]Comments:   [0A][0A]Age over 65?[0A][0A][_] Yes  [x] No[0A][0A]Comments: [0A][0A]Advanced or poorly controlled diabetes? [0A][0A][_] Yes  [x] No[0A][0A]Comments: [0A][0A]Significant immune suppression?[0A][0A][_] Yes  [x] No[0A][0A]Comments: [0A][0A]Other   (describe):[0A][0A][_] Yes  [_] No[0A][0A]Comments: [0A]Past medical history:  hashimotto[0A]Past surgical history: none[0A]Social History:no smoking [0A][0A][0A]Medications: levothyroine[0A]Allergies: suucilycholine[0A][0A][0A]Examination[0A][0A]49 yrs   old male sitting with no distress[0A]General:[0A][0A][x] Normal  [_] Abnormal[0A][0A]Comments (describe key positive and negative findings): [0A][0A]Chest:[0A][0A][x] Normal  [_] Abnormal[0A][0A]Comments (describe key positive and negative findings):   [0A][0A]Abdomen:[0A][0A][x] Normal  [_] Abnormal[0A][0A]Comments (describe key positive and negative findings): [0A][0A]Other:[0A][0A][x] Normal  [_] Abnormal[0A][0A]Comments: [0A]Assessment[0A][0A](consider: Symptom codes for cough- R05, fever- R50 9,   sore throat- R07 0 PLUS contact with and expected exposure to COVID-19- Z20 822 OR Encounter for screening for COVID- Z11 52 OR COVID-19 virus identified- U07 1 (only use for known test-positive cases) OR Symptom code PLUS Personal history of COVID-19-   Z86 16 OR Multisystem Inflammatory Syndrome- M35 81)[0A]Plan[0A][0A]1  Home care: [0A][0A]        a  You may use supportive treatment with in-room humidifier, fluids, rest, and Mucinex or other guaifenesin-containing over the counter cough product  Acetaminophen (Tylenol) can help with the fever and aches  [0A][0A]        b  Avoid close contact with people who are elderly, infirm or who have chronic diseases to help protect them from COVID-19  [0A][0A]        c  Wear a facemask when in   public to protect yourself and others [0A][0A]        d  Stay home when you are sick  [0A][0A]        e  Cover your cough or sneeze with a tissue, then throw the tissue in the trash [0A][0A]        f  Clean and disinfect frequently touched   objects and surfaces using a regular household cleaning spray or wipe  [0A][0A]        g  Wash your hands often with soap and water for at least 20 seconds, especially after going to the bathroom; before eating; and after blowing your nose, coughing,   or sneezing [0A][0A]2  If you have been referred for testing for COVID-19, please isolate and until your results are back and known   [0A][0A]        a  If your test is positive for COVID-19 AND YOU HAVE SYMPTOMS OF COVID, you may stop   self-isolating when: [0A][0A]                i     You have had no fever for at least 24 hours AND[0A][0A]                ii     Other symptoms have improved (such as cough or shortness of breath) AND[0A][0A]                iii      At least 10 days have   passed since your symptoms first appeared[0A][0A]        b  If your test is negative for COVID-19 you may stop self-isolating when: [0A][0A]                i     You have had no fever for at least 24 hours AND[0A][0A]                ii     Other   symptoms have improved (such as cough or shortness of breath) AS long as these symptoms have improved within 4-5 days of onset  [0A][0A]        c  If your clinician ordered a test because you were exposed BUT YOU HAVE NOT HAD ANY SYMPTOMS and your   test is POSITIVE for COVID-19, you should remain in isolation until: [0A][0A]                i     A minimum of 10 days since your positive test results has passed AND[0A][0A]                ii     You have no symptoms such as fever, cough, or shortness   of breath [0A][0A]        d  If your clinician ordered a test because you were exposed BUT YOU HAVE NOT HAD ANY SYMPTOMS and your test is NEGATIVE for COVID-19, it confirms that you have not likely contracted the virus that causes COVID-19  These   tests are not 100% perfect though so be sure to monitor for symptoms and follow your clinician's advice on whether to still quarantine [0A][0A]3  Medications: [0A][0A]        a  Prescriptions (if any): Palovid 3 tab PO BID [0A]        b  Medical   decision making: COVDI 19[0A]4  Other referral (if any): none[0A]5  Follow up: [0A][0A]        a  Please seek in-person care right away if you have worsening chest pains, shortness of breath or difficulty breathing, or if you develop other   symptoms consistent with a medical emergency  [0A]Additional recommendations[0A][0A]1  If you received a prescription at this visit and you have a question or problem, please call [phone] for prescription assistance  [0A][0A]2  Please print a   copy of this note and send it to your regular doctor or take it to your next visit so it may be included in your medical record  [0A][0A]3      Please see your primary care provider on an annual basis or more frequently if directed  [0A][0A]4  If you   are experiencing any new or worsening symptoms that you believe may be a medical emergency, please seek in-person care immediately  [0A][0A]5  Additional recommendations: [0A]The patient voiced understanding and agreement with plan  [0A]    Electronically signed by: Jared Jenkins(NPI [id number])

## 2023-02-20 DIAGNOSIS — E66.01 MORBID OBESITY WITH BODY MASS INDEX (BMI) OF 40.0 OR HIGHER (HCC): Primary | ICD-10-CM

## 2023-03-07 ENCOUNTER — OFFICE VISIT (OUTPATIENT)
Dept: FAMILY MEDICINE CLINIC | Facility: CLINIC | Age: 48
End: 2023-03-07

## 2023-03-07 VITALS
RESPIRATION RATE: 16 BRPM | DIASTOLIC BLOOD PRESSURE: 78 MMHG | WEIGHT: 273 LBS | BODY MASS INDEX: 36.18 KG/M2 | HEART RATE: 68 BPM | HEIGHT: 73 IN | SYSTOLIC BLOOD PRESSURE: 112 MMHG | OXYGEN SATURATION: 98 % | TEMPERATURE: 96.6 F

## 2023-03-07 DIAGNOSIS — E06.3 HASHIMOTO'S THYROIDITIS: ICD-10-CM

## 2023-03-07 DIAGNOSIS — E66.9 CLASS 2 OBESITY WITHOUT SERIOUS COMORBIDITY WITH BODY MASS INDEX (BMI) OF 36.0 TO 36.9 IN ADULT, UNSPECIFIED OBESITY TYPE: Primary | ICD-10-CM

## 2023-03-07 DIAGNOSIS — Z12.11 ENCOUNTER FOR SCREENING COLONOSCOPY: ICD-10-CM

## 2023-03-07 DIAGNOSIS — G47.33 OSA (OBSTRUCTIVE SLEEP APNEA): ICD-10-CM

## 2023-03-07 PROBLEM — E66.812 CLASS 2 OBESITY WITH BODY MASS INDEX (BMI) OF 36.0 TO 36.9 IN ADULT: Status: ACTIVE | Noted: 2018-03-28

## 2023-03-07 NOTE — PROGRESS NOTES
Name: Elio Barlow      : 1975      MRN: [id number]  Encounter Provider: Vidal Orlando MD  Encounter Date: 3/7/2023   Encounter department: [place] [address]  Class 2 obesity without serious comorbidity with body mass index (BMI) of 36 0 to 36 9 in adult, unspecified obesity type  Assessment & Plan:  Initial weight: 300 lbs  Current weight : 270lbs   Doing well on wegovy  Continue portion control and regular exercise          2  Encounter for screening colonoscopy  -     Ambulatory referral for colonoscopy; Future    3  VIJI (obstructive sleep apnea)  Assessment & Plan:  CPAP at bedtime      4  Hashimoto's thyroiditis/hypothyroidism  Assessment & Plan:  Stable   Continue current meds     Orders:  -     CBC and differential  -     Comprehensive metabolic panel  -     Hemoglobin A1C  -     Lipid panel  -     TSH, 3rd generation with Free T4 reflex; Future           Subjective      HPI   Here for follow up  Feeling well overall  Cutting back calories   400-600 calories /meal and 2 big meals and snacks through out the day  Walking more, using rowing machines once a week   No side effects from wegovy other than "low blood sugar" episodes in the beginning of 1 mg wegovy but now he has no more issues     Review of Systems   Constitutional: Negative  HENT: Negative  Eyes: Negative  Respiratory: Negative  Cardiovascular: Negative  Gastrointestinal: Negative  Endocrine: Negative  Genitourinary: Negative  Musculoskeletal: Negative  Skin: Negative  Allergic/Immunologic: Negative  Neurological: Negative  Hematological: Negative  Psychiatric/Behavioral: Negative          Current Outpatient Medications on File Prior to Visit   Medication Sig   • levothyroxine 125 mcg tablet Take 1 tablet (125 mcg total) by mouth daily   • Semaglutide-Weight Management (WEGOVY) 1 MG/0 5ML Inject 0 5 mL (1 mg total) under the skin once a week for 28 days   • [DISCONTINUED] cyclobenzaprine (FLEXERIL) 10 mg tablet Take 1 tablet (10 mg total) by mouth daily at bedtime (Patient not taking: Reported on 11/1/2022)   • [DISCONTINUED] methylPREDNISolone 4 MG tablet therapy pack Use as directed on package (Patient not taking: Reported on 11/1/2022)   • [DISCONTINUED] zolpidem (AMBIEN) 5 mg tablet Take 1 tablet (5 mg total) by mouth daily at bedtime as needed for sleep (Patient not taking: Reported on 11/1/2022)       Objective     /78 (BP Location: Left arm, Patient Position: Sitting, Cuff Size: Large)   Pulse 68   Temp (!) 96 6 °F (35 9 °C) (Tympanic)   Resp 16   Ht 6' 1" (1 854 m)   Wt 124 kg (273 lb)   SpO2 98%   BMI 36 02 kg/m²     Physical Exam  Vitals and nursing note reviewed  Constitutional:       Appearance: He is well-developed  HENT:      Head: Normocephalic and atraumatic  Right Ear: Tympanic membrane normal       Left Ear: Tympanic membrane normal       Nose: Nose normal    Eyes:      Pupils: Pupils are equal, round, and reactive to light  Neck:      Thyroid: No thyromegaly  Cardiovascular:      Rate and Rhythm: Normal rate and regular rhythm  Pulses: Normal pulses  Heart sounds: Normal heart sounds  No murmur heard  Pulmonary:      Effort: Pulmonary effort is normal       Breath sounds: Normal breath sounds  Abdominal:      General: Bowel sounds are normal       Palpations: Abdomen is soft  Musculoskeletal:         General: No deformity  Normal range of motion  Cervical back: Normal range of motion and neck supple  Skin:     General: Skin is warm  Capillary Refill: Capillary refill takes less than 2 seconds  Findings: No erythema or rash  Neurological:      General: No focal deficit present  Mental Status: He is alert and oriented to person, place, and time  Deep Tendon Reflexes: Reflexes are normal and symmetric     Psychiatric:         Mood and Affect: Mood normal          Behavior: Behavior normal        Shekhar Booker MD

## 2023-03-07 NOTE — ASSESSMENT & PLAN NOTE
Initial weight: 300 lbs  Current weight : 270lbs   Doing well on wegovy  Continue portion control and regular exercise

## 2023-03-21 DIAGNOSIS — E66.9 CLASS 2 OBESITY WITHOUT SERIOUS COMORBIDITY WITH BODY MASS INDEX (BMI) OF 36.0 TO 36.9 IN ADULT, UNSPECIFIED OBESITY TYPE: Primary | ICD-10-CM

## 2023-04-20 DIAGNOSIS — E66.9 CLASS 2 OBESITY WITHOUT SERIOUS COMORBIDITY WITH BODY MASS INDEX (BMI) OF 36.0 TO 36.9 IN ADULT, UNSPECIFIED OBESITY TYPE: Primary | ICD-10-CM

## 2023-05-22 ENCOUNTER — APPOINTMENT (OUTPATIENT)
Dept: LAB | Facility: CLINIC | Age: 48
End: 2023-05-22

## 2023-05-22 DIAGNOSIS — E06.3 HASHIMOTO'S THYROIDITIS: ICD-10-CM

## 2023-05-22 LAB
ALBUMIN SERPL BCP-MCNC: 4.8 G/DL (ref 3.5–5)
ALP SERPL-CCNC: 75 U/L (ref 34–104)
ALT SERPL W P-5'-P-CCNC: 40 U/L (ref 7–52)
ANION GAP SERPL CALCULATED.3IONS-SCNC: 5 MMOL/L (ref 4–13)
AST SERPL W P-5'-P-CCNC: 22 U/L (ref 13–39)
BASOPHILS # BLD AUTO: 0.01 THOUSANDS/ÂΜL (ref 0–0.1)
BASOPHILS NFR BLD AUTO: 0 % (ref 0–1)
BILIRUB SERPL-MCNC: 0.67 MG/DL (ref 0.2–1)
BUN SERPL-MCNC: 13 MG/DL (ref 5–25)
CALCIUM SERPL-MCNC: 9.3 MG/DL (ref 8.4–10.2)
CHLORIDE SERPL-SCNC: 104 MMOL/L (ref 96–108)
CHOLEST SERPL-MCNC: 162 MG/DL
CO2 SERPL-SCNC: 30 MMOL/L (ref 21–32)
CREAT SERPL-MCNC: 0.97 MG/DL (ref 0.6–1.3)
EOSINOPHIL # BLD AUTO: 0.05 THOUSAND/ÂΜL (ref 0–0.61)
EOSINOPHIL NFR BLD AUTO: 1 % (ref 0–6)
ERYTHROCYTE [DISTWIDTH] IN BLOOD BY AUTOMATED COUNT: 14.2 % (ref 11.6–15.1)
EST. AVERAGE GLUCOSE BLD GHB EST-MCNC: 105 MG/DL
GFR SERPL CREATININE-BSD FRML MDRD: 93 ML/MIN/1.73SQ M
GLUCOSE P FAST SERPL-MCNC: 90 MG/DL (ref 65–99)
HBA1C MFR BLD: 5.3 %
HCT VFR BLD AUTO: 47.4 % (ref 36.5–49.3)
HDLC SERPL-MCNC: 42 MG/DL
HGB BLD-MCNC: 15.8 G/DL (ref 12–17)
IMM GRANULOCYTES # BLD AUTO: 0.01 THOUSAND/UL (ref 0–0.2)
IMM GRANULOCYTES NFR BLD AUTO: 0 % (ref 0–2)
LDLC SERPL CALC-MCNC: 98 MG/DL (ref 0–100)
LYMPHOCYTES # BLD AUTO: 1.51 THOUSANDS/ÂΜL (ref 0.6–4.47)
LYMPHOCYTES NFR BLD AUTO: 22 % (ref 14–44)
MCH RBC QN AUTO: 29.9 PG (ref 26.8–34.3)
MCHC RBC AUTO-ENTMCNC: 33.3 G/DL (ref 31.4–37.4)
MCV RBC AUTO: 90 FL (ref 82–98)
MONOCYTES # BLD AUTO: 0.47 THOUSAND/ÂΜL (ref 0.17–1.22)
MONOCYTES NFR BLD AUTO: 7 % (ref 4–12)
NEUTROPHILS # BLD AUTO: 4.88 THOUSANDS/ÂΜL (ref 1.85–7.62)
NEUTS SEG NFR BLD AUTO: 70 % (ref 43–75)
NONHDLC SERPL-MCNC: 120 MG/DL
NRBC BLD AUTO-RTO: 0 /100 WBCS
PLATELET # BLD AUTO: 217 THOUSANDS/UL (ref 149–390)
PMV BLD AUTO: 11.4 FL (ref 8.9–12.7)
POTASSIUM SERPL-SCNC: 4.2 MMOL/L (ref 3.5–5.3)
PROT SERPL-MCNC: 7.2 G/DL (ref 6.4–8.4)
RBC # BLD AUTO: 5.28 MILLION/UL (ref 3.88–5.62)
SODIUM SERPL-SCNC: 139 MMOL/L (ref 135–147)
TRIGL SERPL-MCNC: 108 MG/DL
TSH SERPL DL<=0.05 MIU/L-ACNC: 2.9 UIU/ML (ref 0.45–4.5)
WBC # BLD AUTO: 6.93 THOUSAND/UL (ref 4.31–10.16)

## 2023-05-22 RX ORDER — LEVOTHYROXINE SODIUM 0.12 MG/1
125 TABLET ORAL DAILY
Qty: 90 TABLET | Refills: 0 | Status: SHIPPED | OUTPATIENT
Start: 2023-05-22

## 2023-07-06 ENCOUNTER — EVALUATION (OUTPATIENT)
Dept: PHYSICAL THERAPY | Facility: CLINIC | Age: 48
End: 2023-07-06
Payer: COMMERCIAL

## 2023-07-06 DIAGNOSIS — M54.12 CERVICAL RADICULOPATHY: Primary | ICD-10-CM

## 2023-07-06 DIAGNOSIS — M77.02 MEDIAL EPICONDYLITIS OF LEFT ELBOW: ICD-10-CM

## 2023-07-06 DIAGNOSIS — M77.02 MEDIAL EPICONDYLITIS OF LEFT ELBOW: Primary | ICD-10-CM

## 2023-07-06 DIAGNOSIS — M54.42 ACUTE LEFT-SIDED LOW BACK PAIN WITH LEFT-SIDED SCIATICA: ICD-10-CM

## 2023-07-06 PROCEDURE — 97140 MANUAL THERAPY 1/> REGIONS: CPT

## 2023-07-06 PROCEDURE — 97161 PT EVAL LOW COMPLEX 20 MIN: CPT

## 2023-07-06 RX ORDER — METHYLPREDNISOLONE 4 MG/1
TABLET ORAL
Qty: 21 EACH | Refills: 0 | Status: SHIPPED | OUTPATIENT
Start: 2023-07-06

## 2023-07-06 NOTE — PROGRESS NOTES
PT Evaluation     Today's date: 2023  Patient name: Festus Mehta  : 1975  MRN: [id number]  Referring provider: Ml Bolden MD  Dx:   Encounter Diagnosis     ICD-10-CM    1. Cervical radiculopathy  M54.12       2. Medial epicondylitis of left elbow  M77.02 Ambulatory Referral to Physical Therapy                 Assessment  Assessment details: Festus Mehta is a 56 y.o. male who presents with signs and symptoms consistent of cervical radiculopathy. Patient presents with pain and postural dysfunction. Due to these impairments, Patient has difficulty performing lifting and pushing/pulling. Pt responded well on IE to repeated cervical flexion and R SB. Pt was not positive for any elbow tests and did demonstrate some nerve tension. Patient would benefit from skilled physical therapy to address the impairments, improve their level of function, and to improve their overall quality of life. Reviewed HEP, involved anatomy, physio as well as POC with verbalized understanding and pt is in agreement with above. Impairments: abnormal gait, abnormal or restricted ROM, impaired balance, impaired physical strength, lacks appropriate home exercise program, pain with function and poor body mechanics    Goals  Short Term Goals: to be achieved by 4 weeks  1) Patient to be independent with basic HEP. 2) Decrease pain to 5/10 at its worst.  3) Report no feeling of swelling in 4th & 5th met  5) Pt will be able to simulate motion of casting hook for fishing without pain    Long Term Goals: to be achieved by discharge  1) FOTO equal to or greater than projected goal.  2) Patient to be independent with comprehensive HEP.   3) Pt will return to fishing pain and symptom free  4) Pt will return to working pain & symptom free  5) Lifting is improved to maximal level of function     Plan  Patient would benefit from: skilled physical therapy  Planned modality interventions: traction, cryotherapy, TENS and thermotherapy: hydrocollator packs  Planned therapy interventions: abdominal trunk stabilization, ADL training, body mechanics training, home exercise program, functional ROM exercises, flexibility, therapeutic exercise, therapeutic activities, stretching, strengthening, joint mobilization, manual therapy, neuromuscular re-education, patient education, postural training, massage, Hamlin taping and therapeutic training  Frequency: 2x week  Duration in visits: 12  Duration in weeks: 6  Treatment plan discussed with: patient        Subjective Evaluation    History of Present Illness  Mechanism of injury: History: Pt reports he was at the beach for 2 wks and started fishing. His first symptoms were his wrist cracking followed by by medial hand pain and fingers cramping/curling. Pt has been massaging his forearm and that helps. Gripping is very difficult and his pinky won't cooperate. He has medial epi. Previously and his is different symptoms. Pt gets some pins/needles down the arm. Was previously trying ibuprofen and that didn't help too much. Sleeping was very difficult. No pain reported in the actual elbow. Gradual onset. Had to sleep with arm fully extended and in external rotation. Aggravating factors:  Relieving factors:  Imaging: none  Occupation: nurse  Status: full time  Hobbies/recreation: fishing  Pain  Current pain ratin  At best pain ratin  At worst pain ratin  Relieving factors: ice      Diagnostic Tests  No diagnostic tests performed        Objective     Static Posture     Head  Forward. Shoulders  Rounded. Palpation     Additional Palpation Details  4th & 5th met. - base of 5th met    Active Range of Motion   Cervical/Thoracic Spine     Normal active range of motion  Left Shoulder   Normal active range of motion    Left Elbow   Normal active range of motion    Joint Play   Joints within functional limits: C3, C4, C5, C6, C7, T1, T2 and T3   Mechanical Assessment    Cervical    Seated Flexion: Pain location: peripheralized  Pain intensity: worse  Pain level: increased  Seated Extension:   Pain location: centralized  Pain intensity: better  Pain level: decreased  Seated right sidebend: repeated movements  Pain location: centralized  Pain intensity: better  Pain level: decreased    Thoracic      Lumbar      Strength/Myotome Testing     Left Shoulder   Normal muscle strength    Right Shoulder   Normal muscle strength    Left Elbow   Normal strength    Tests   Cervical   Positive repeated flexion. Negative vertical compression and cervical distraction. Left Shoulder   Positive ULTT4. Left Elbow   Negative Tinel's sign (cubital tunnel), valgus stress at 0 degrees, valgus stress at 30 degrees, varus stress at 0 degrees and varus stress at 30 degrees. Lumbar   Negative vertical compression. Additional Tests Details  T/s rot.  50% limit each             Precautions: standard      Manuals 7/6            Cerv. traction 6'            Ulnar NG supine 5'                                      Neuro Re-Ed                                                                                                        Ther Ex             Chin tuck c retraction 30x            Cerv. R SB 30x                                                                                          Ther Activity                                       Gait Training                                       Modalities

## 2023-07-10 ENCOUNTER — OFFICE VISIT (OUTPATIENT)
Dept: PHYSICAL THERAPY | Facility: CLINIC | Age: 48
End: 2023-07-10
Payer: COMMERCIAL

## 2023-07-10 DIAGNOSIS — M77.02 MEDIAL EPICONDYLITIS OF LEFT ELBOW: ICD-10-CM

## 2023-07-10 DIAGNOSIS — M54.12 CERVICAL RADICULOPATHY: Primary | ICD-10-CM

## 2023-07-10 PROCEDURE — 97140 MANUAL THERAPY 1/> REGIONS: CPT

## 2023-07-10 PROCEDURE — 97110 THERAPEUTIC EXERCISES: CPT

## 2023-07-10 NOTE — PROGRESS NOTES
Daily Note     Today's date: 7/10/2023  Patient name: Yevgeniy Jacinto  : 1975  MRN: [id number]  Referring provider: Bere Gracia MD  Dx:   Encounter Diagnosis     ICD-10-CM    1. Cervical radiculopathy  M54.12       2. Medial epicondylitis of left elbow  M77.02         Start Time: 1658  Stop Time: 1736  Total time in clinic (min): 38 minutes  Subjective: Pt reports his symptoms have improved and went from the hand into the elbow. Pt reports he worked long, 16 hrs days and felt uncomfortable when pulling pts. Pt has been able to return to sleeping normally and not having to sleep with UE extended. Continues to present with poor posture. Objective: See treatment diary below      Assessment: Tolerated treatment well. Patient demonstrated fatigue post treatment, exhibited good technique with therapeutic exercises and would benefit from continued PT. Pt reported upon arrival that he still had some base 5th met discomfort. Following manual t/s mobs as well as open books, pt noted no base 5th met. Pt was very pleased with result of IE and first follow up. Progressed HEP to include open books and doorway stretch and will follow up in 2 days. Plan: Continue per plan of care. Progress treatment as tolerated. Precautions: standard      Manuals 7/6 7/10           Cerv. traction 6'            Ulnar NG supine 5'            Lower cer./upper t/s PA  CPA/ UPA GII/GIII                        Neuro Re-Ed                                                                                                        Ther Ex             Chin tuck c retraction 30x            Cerv. R SB 30x            Cerv. Ext.  20x           Open books  5"x10 B           Pec stretch 90 deg. abd  15"x4 ea           Rows, pulldowns  18# 2x10           Seated t/s rot.   5"x10 B                        Ther Activity                                       Gait Training                                       Modalities

## 2023-07-11 DIAGNOSIS — E66.9 CLASS 2 OBESITY WITHOUT SERIOUS COMORBIDITY WITH BODY MASS INDEX (BMI) OF 36.0 TO 36.9 IN ADULT, UNSPECIFIED OBESITY TYPE: ICD-10-CM

## 2023-07-11 RX ORDER — SEMAGLUTIDE 2.4 MG/.75ML
INJECTION, SOLUTION SUBCUTANEOUS
Qty: 3 ML | Refills: 0 | Status: SHIPPED | OUTPATIENT
Start: 2023-07-11

## 2023-07-12 ENCOUNTER — OFFICE VISIT (OUTPATIENT)
Dept: PHYSICAL THERAPY | Facility: CLINIC | Age: 48
End: 2023-07-12
Payer: COMMERCIAL

## 2023-07-12 DIAGNOSIS — M77.02 MEDIAL EPICONDYLITIS OF LEFT ELBOW: ICD-10-CM

## 2023-07-12 DIAGNOSIS — M54.12 CERVICAL RADICULOPATHY: Primary | ICD-10-CM

## 2023-07-12 PROCEDURE — 97110 THERAPEUTIC EXERCISES: CPT

## 2023-07-12 NOTE — PROGRESS NOTES
Daily Note     Today's date: 2023  Patient name: Clark Vásquez  : 1975  MRN: [id number]  Referring provider: Shannon Crooks MD  Dx:   Encounter Diagnosis     ICD-10-CM    1. Cervical radiculopathy  M54.12       2. Medial epicondylitis of left elbow  M77.02                      Subjective:  Irritation of sx with repeated lifting yesterday, with sx radiating from the L elbow to the 4th and 5th digits. stretching throughout the day did help with reducing the sx. Objective: See treatment diary below      Assessment: Tolerated treatment well. N+T with UBE, D/C. Little change in status with repeated C/s extension or retraction. Thoracic rotations in sitting and S/L had positive results for reduction of sx, but with B/L limitation in arc of motion. Cuing for parascapular engagement with rows and pulls downs, minimal sx with tasks but they increase with rest. Return to baseline symptom presentation with manual cervical traction with elbow prop and forearm on abdomin. Continued PT would be beneficial to improve function.          Plan: Continue per plan of care. Precautions: standard      Manuals 7/6 7/10 7/12          Cerv. traction 6'  5'          Ulnar NG supine 5'            Lower cer./upper t/s PA  CPA/ UPA GII/GIII                        Neuro Re-Ed                                                                                                        Ther Ex             Chin tuck c retraction 30x            Cerv. R SB 30x            Cerv. Ext.  20x 20x          Open books  5"x10 B 5"x15 B          Pec stretch 90 deg. abd  15"x4 ea 4x20" ea          Rows, pulldowns  18# 2x10 18#, 15# 2x10          Seated t/s rot.   5"x10 B 5"x10 B          Retro UBE- D/C   4' inc N+T D/C         Ther Activity                                       Gait Training                                       Modalities

## 2023-07-18 ENCOUNTER — OFFICE VISIT (OUTPATIENT)
Dept: PHYSICAL THERAPY | Facility: CLINIC | Age: 48
End: 2023-07-18
Payer: COMMERCIAL

## 2023-07-18 DIAGNOSIS — M54.12 CERVICAL RADICULOPATHY: Primary | ICD-10-CM

## 2023-07-18 DIAGNOSIS — M77.02 MEDIAL EPICONDYLITIS OF LEFT ELBOW: ICD-10-CM

## 2023-07-18 PROCEDURE — 97110 THERAPEUTIC EXERCISES: CPT

## 2023-07-18 PROCEDURE — 97140 MANUAL THERAPY 1/> REGIONS: CPT

## 2023-07-18 NOTE — PROGRESS NOTES
Daily Note     Today's date: 2023  Patient name: Festus eMhta  : 1975  MRN: [id number]  Referring provider: Ml Bolden MD  Dx:   Encounter Diagnosis     ICD-10-CM    1. Cervical radiculopathy  M54.12       2. Medial epicondylitis of left elbow  M77.02         Start Time: 1100  Stop Time: 1138  Total time in clinic (min): 38 minutes  Subjective: Pt presents to PT noting improvements in overall symptoms including minimal hand discomfort and some elbow discomfort. Pt reports he will still get discomfort at times at work when moving or transferring pts. Objective: See treatment diary below      Assessment: Tolerated treatment well. Patient demonstrated fatigue post treatment, exhibited good technique with therapeutic exercises and would benefit from continued PT. Assess tolerance next session to strengthening exercises that were added today as well as his symptoms. Plan: Continue per plan of care. Progress treatment as tolerated. Precautions: standard      Manuals 7/6 7/10 7/12 7/18         Cerv. traction 6'  5' 6'         Ulnar NG supine 5'            Lower cer./upper t/s PA  CPA/ UPA GII/GIII  CPA GII/GIII                      Neuro Re-Ed                                                                                                        Ther Ex             Push-up plus    3"x20         Chin tuck c retraction 30x            Cerv. R SB 30x            Cerv. Ext.  20x 20x          Open books  5"x10 B 5"x15 B 5"x10 ea         Pec stretch 90 deg. abd  15"x4 ea 4x20" ea          Rows, pulldowns  18# 2x10 18#, 15# 2x10 18# 30x ea         Seated t/s rot.   5"x10 B 5"x10 B          pec fly     10# 20x B         Retro UBE- D/C   4' inc N+T D/C         Ther Activity                                       Gait Training                                       Modalities

## 2023-07-21 ENCOUNTER — OFFICE VISIT (OUTPATIENT)
Dept: PHYSICAL THERAPY | Facility: CLINIC | Age: 48
End: 2023-07-21
Payer: COMMERCIAL

## 2023-07-21 DIAGNOSIS — M54.12 CERVICAL RADICULOPATHY: Primary | ICD-10-CM

## 2023-07-21 DIAGNOSIS — M77.02 MEDIAL EPICONDYLITIS OF LEFT ELBOW: ICD-10-CM

## 2023-07-21 PROCEDURE — 97140 MANUAL THERAPY 1/> REGIONS: CPT

## 2023-07-21 PROCEDURE — 97110 THERAPEUTIC EXERCISES: CPT

## 2023-07-21 NOTE — PROGRESS NOTES
Daily Note     Today's date: 2023  Patient name: Monique Pang  : 1975  MRN: [id number]  Referring provider: Ciro De La O MD  Dx:   Encounter Diagnosis     ICD-10-CM    1. Cervical radiculopathy  M54.12       2. Medial epicondylitis of left elbow  M77.02           Start Time: 0936  Stop Time: 1009  Total time in clinic (min): 33 minutes    Subjective: Pt notes he was sore after last sessions additions however no sig increase in pain. Objective: See treatment diary below      Assessment: Tolerated treatment well. Patient demonstrated fatigue post treatment, exhibited good technique with therapeutic exercises and would benefit from continued PT. Progress HEP next week prior to going on vacation. Plan: Continue per plan of care. Progress treatment as tolerated. Precautions: standard      Manuals 7/6 7/10 7/12 7/18 7/21        Cerv. traction 6'  5' 6' 5'        Ulnar NG supine 5'            Lower cer./upper t/s PA  CPA/ UPA GII/GIII  CPA GII/GIII CPA GIII                     Neuro Re-Ed                                                                                                        Ther Ex             Push-up plus    3"x20 3"x20        Chin tuck c retraction 30x            Cerv. R SB 30x            Cerv. Ext.  20x 20x          Open books  5"x10 B 5"x15 B 5"x10 ea 5"x10        Pec stretch 90 deg. abd  15"x4 ea 4x20" ea          Rows, pulldowns  18# 2x10 18#, 15# 2x10 18# 30x ea 19# 20x ea        Seated t/s rot.   5"x10 B 5"x10 B          pec fly     10# 20x B 10# 20B        Reverse fly     4# 20x        Retro UBE- D/C   4' inc N+T D/C         Ther Activity                                       Gait Training                                       Modalities

## 2023-07-25 ENCOUNTER — OFFICE VISIT (OUTPATIENT)
Dept: PHYSICAL THERAPY | Facility: CLINIC | Age: 48
End: 2023-07-25
Payer: COMMERCIAL

## 2023-07-25 DIAGNOSIS — M54.12 CERVICAL RADICULOPATHY: Primary | ICD-10-CM

## 2023-07-25 DIAGNOSIS — M77.02 MEDIAL EPICONDYLITIS OF LEFT ELBOW: ICD-10-CM

## 2023-07-25 PROCEDURE — 97140 MANUAL THERAPY 1/> REGIONS: CPT

## 2023-07-25 PROCEDURE — 97110 THERAPEUTIC EXERCISES: CPT

## 2023-07-25 NOTE — PROGRESS NOTES
Daily Note     Today's date: 2023  Patient name: Lester Cordero  : 1975  MRN: [id number]  Referring provider: Kali Soares MD  Dx:   Encounter Diagnosis     ICD-10-CM    1. Cervical radiculopathy  M54.12       2. Medial epicondylitis of left elbow  M77.02         Start Time: 0816  Stop Time: 0845  Total time in clinic (min): 29 minutes  Subjective: Pt reports having feeling of "dead arm" after last session for about 3 hours but has had no pain since, even at work, he reports no discomfort. Objective: See treatment diary below       Assessment: Tolerated treatment well. Patient demonstrated fatigue post treatment, exhibited good technique with therapeutic exercises and would benefit from continued PT. Plan: Continue per plan of care. Progress treatment as tolerated. Precautions: standard      Manuals 7/6 7/10 7/12 7/18 7/21 7/25       Cerv. traction 6'  5' 6' 5'        Ulnar NG supine 5'            Lower cer./upper t/s PA  CPA/ UPA GII/GIII  CPA GII/GIII CPA GIII CPA GIII                    Neuro Re-Ed                                                                                                        Ther Ex             Push-up plus    3"x20 3"x20 3"x20       Chin tuck c retraction 30x            Cerv. R SB 30x            Cerv. Ext.  20x 20x          Open books  5"x10 B 5"x15 B 5"x10 ea 5"x10        Pec stretch 90 deg. abd  15"x4 ea 4x20" ea          Rows, pulldowns  18# 2x10 18#, 15# 2x10 18# 30x ea 19# 20x ea 20# 20x       Seated t/s ext.      5"x10       Seated t/s rot.   5"x10 B 5"x10 B          pec fly     10# 20x B 10# 20B 10# 20x ea       Prone scap squeeze      20x on Pball       Reverse fly     4# 20x        Retro UBE- D/C   4' inc N+T D/C         Ther Activity             OH press c ER      5# 2x10                    Gait Training                                       Modalities

## 2023-07-28 ENCOUNTER — OFFICE VISIT (OUTPATIENT)
Dept: PHYSICAL THERAPY | Facility: CLINIC | Age: 48
End: 2023-07-28
Payer: COMMERCIAL

## 2023-07-28 DIAGNOSIS — M54.12 CERVICAL RADICULOPATHY: Primary | ICD-10-CM

## 2023-07-28 DIAGNOSIS — M77.02 MEDIAL EPICONDYLITIS OF LEFT ELBOW: ICD-10-CM

## 2023-07-28 PROCEDURE — 97530 THERAPEUTIC ACTIVITIES: CPT

## 2023-07-28 PROCEDURE — 97110 THERAPEUTIC EXERCISES: CPT

## 2023-07-28 NOTE — PROGRESS NOTES
Daily Note     Today's date: 2023  Patient name: Noah Sanchez  : 1975  MRN: [id number]  Referring provider: Ava Wong MD  Dx:   Encounter Diagnosis     ICD-10-CM    1. Cervical radiculopathy  M54.12       2. Medial epicondylitis of left elbow  M77.02         Start Time: 732  Stop Time:   Total time in clinic (min): 42 minutes  Subjective: Pt reports he felt good after last session however he notes an increase in pain yesterday at work as well as after packing to go to on vacation requiring lifting of a cooler, bikes and being on a ladder for an extended period of time. Objective: See treatment diary below      Assessment: Tolerated treatment well. Patient demonstrated fatigue post treatment, exhibited good technique with therapeutic exercises and would benefit from continued PT. Progressed HEP and tx to include greater focus on back and postural muscle strengthening with good tolerance. Emphasized form today for greater carry over when on vacation. Will follow up in 2 wks upon return from his vacation to determine PN & continue vs d/c.      Plan: Continue per plan of care. Progress treatment as tolerated. Precautions: standard      Manuals 7/6 7/10 7/12 7/18 7/21 7/25 7/28      Cerv. traction 6'  5' 6' 5'        Ulnar NG supine 5'            Lower cer./upper t/s PA  CPA/ UPA GII/GIII  CPA GII/GIII CPA GIII CPA GIII CPA/UPA GII/GIII                   Neuro Re-Ed                                                                                                        Ther Ex             Push-up plus    3"x20 3"x20 3"x20       Chin tuck c retraction 30x            Cerv. R SB 30x            Cerv. Ext.  20x 20x          Open books  5"x10 B 5"x15 B 5"x10 ea 5"x10  5"x10 ea      Pec stretch 90 deg. abd  15"x4 ea 4x20" ea          Rows, pulldowns  18# 2x10 18#, 15# 2x10 18# 30x ea 19# 20x ea 20# 20x 33# 20x ea      Seated t/s ext.      5"x10       Seated t/s rot.   5"x10 B 5"x10 B pec fly     10# 20x B 10# 20B 10# 20x ea       Prone scap squeeze      20x on Pball 20x ea & scaption                   Reverse fly     4# 20x  5# 20x ea      Retro UBE- D/C   4' inc N+T D/C         Ther Activity             OH press c ER      5# 2x10 5# 2x10      "cooler lift"       20# 20x      Gait Training                                       Modalities

## 2023-08-11 DIAGNOSIS — E66.9 CLASS 2 OBESITY WITHOUT SERIOUS COMORBIDITY WITH BODY MASS INDEX (BMI) OF 36.0 TO 36.9 IN ADULT, UNSPECIFIED OBESITY TYPE: ICD-10-CM

## 2023-08-11 RX ORDER — SEMAGLUTIDE 2.4 MG/.75ML
INJECTION, SOLUTION SUBCUTANEOUS
Qty: 3 ML | Refills: 0 | Status: SHIPPED | OUTPATIENT
Start: 2023-08-11

## 2023-08-14 ENCOUNTER — OFFICE VISIT (OUTPATIENT)
Dept: PHYSICAL THERAPY | Facility: CLINIC | Age: 48
End: 2023-08-14
Payer: COMMERCIAL

## 2023-08-14 DIAGNOSIS — M77.02 MEDIAL EPICONDYLITIS OF LEFT ELBOW: ICD-10-CM

## 2023-08-14 DIAGNOSIS — M54.12 CERVICAL RADICULOPATHY: Primary | ICD-10-CM

## 2023-08-14 PROCEDURE — 97530 THERAPEUTIC ACTIVITIES: CPT

## 2023-08-14 NOTE — PROGRESS NOTES
Discharge    Today's date: 2023  Patient name: Kingsley Evans  : 1975  MRN: [id number]  Referring provider: Ulises Sheikh MD  Dx:   Encounter Diagnosis     ICD-10-CM    1. Cervical radiculopathy  M54.12       2. Medial epicondylitis of left elbow  M77.02         Start Time: 0808  Stop Time: 08  Total time in clinic (min): 17 minutes  Subjective: Pt just returned from vacation and the only time he was getting any discomfort was during an 11-12 hour drive and it was just minor in the elbow when handing his arm down. Pt had no issues loading the car as well. Pt also notes he was able to do HEP with no issues. Pt also returned to work with no issues. Pt reports he is 100% better and has had no pain 0-10 in the last week. Objective: See treatment diary below    Objective     Static Posture     Head  Forward. Shoulders  Rounded. Active Range of Motion   Cervical/Thoracic Spine     Normal active range of motion  Left Shoulder   Normal active range of motion    Left Elbow   Normal active range of motion    Joint Play    Joints within functional limits: C3, C4, C5, C6, C7, T1, T2 and T3     Strength/Myotome Testing     Left Shoulder   Normal muscle strength    Right Shoulder   Normal muscle strength    Left Elbow   Normal strength    Tests   Cervical   Negative vertical compression and cervical distraction. Left Elbow   Negative Tinel's sign (cubital tunnel), valgus stress at 0 degrees, valgus stress at 30 degrees, varus stress at 0 degrees and varus stress at 30 degrees. Lumbar   Negative vertical compression. Additional Tests Details  T/s rot WFL B    Assessment: Kingsley Evans is a pleasant 56 y.o. male who has been receiving PT intervention for cervical radiculopathy effecting the LUE. The patient has demonstrated decreased pain, increased ROM, increased joint mobility, improved posture  and increased activity tolerance since beginning treatment.  Pt notes no remaining s/s and has been able to manage symptoms with HEP when away on vacation. Pt also has returned to work symptom free. Pt is in agreement with POC of d/c from PT and will follow up in the future should anything else arise. Pt was also able to return to fishing over vacation symptom free without limitations or restrictions. Reviewed with pt to continue with HEP at least 2x/wk over the next three weeks to continue to maintain progress that has been made. Goals  Short Term Goals: to be achieved by 4 weeks  1) Patient to be independent with basic HEP- MET  2) Decrease pain to 5/10 at its worst- MET  3) Report no feeling of swelling in 4th & 5th met-MET  5) Pt will be able to simulate motion of casting hook for fishing without pain- MET    Long Term Goals: to be achieved by discharge  1) FOTO equal to or greater than projected goal- MET  2) Patient to be independent with comprehensive HEP- MET  3) Pt will return to fishing pain and symptom free- MET  4) Pt will return to working pain & symptom free- MET  5) Lifting is improved to maximal level of function - MET    Plan: discharge from physical therapy to HEP     Precautions: standard      Manuals 7/6 7/10 7/12 7/18 7/21 7/25 7/28 8/14     Cerv. traction 6'  5' 6' 5'        Ulnar NG supine 5'            Lower cer./upper t/s PA  CPA/ UPA GII/GIII  CPA GII/GIII CPA GIII CPA GIII CPA/UPA GII/GIII                   Neuro Re-Ed                                                                                                        Ther Ex             Push-up plus    3"x20 3"x20 3"x20       Chin tuck c retraction 30x            Cerv. R SB 30x            Cerv. Ext.  20x 20x          Open books  5"x10 B 5"x15 B 5"x10 ea 5"x10  5"x10 ea      Pec stretch 90 deg. abd  15"x4 ea 4x20" ea          Rows, pulldowns  18# 2x10 18#, 15# 2x10 18# 30x ea 19# 20x ea 20# 20x 33# 20x ea      Seated t/s ext.      5"x10       Seated t/s rot.   5"x10 B 5"x10 B          pec fly     10# 20x B 10# 35W 10# 20x ea Prone scap squeeze      20x on Pball 20x ea & scaption                   Reverse fly     4# 20x  5# 20x ea      Retro UBE- D/C   4' inc N+T D/C         Ther Activity             OH press c ER      5# 2x10 5# 2x10      "cooler lift"       20# 20x      D/c measurements, HEP review, goals, progress, posture        RE                               Modalities

## 2023-08-15 ENCOUNTER — HOSPITAL ENCOUNTER (EMERGENCY)
Facility: HOSPITAL | Age: 48
Discharge: HOME | End: 2023-08-15
Attending: EMERGENCY MEDICINE | Admitting: EMERGENCY MEDICINE
Payer: COMMERCIAL

## 2023-08-15 VITALS
SYSTOLIC BLOOD PRESSURE: 149 MMHG | TEMPERATURE: 98.3 F | WEIGHT: 273 LBS | OXYGEN SATURATION: 97 % | RESPIRATION RATE: 18 BRPM | HEART RATE: 71 BPM | BODY MASS INDEX: 36.02 KG/M2 | DIASTOLIC BLOOD PRESSURE: 94 MMHG

## 2023-08-15 DIAGNOSIS — R10.13 EPIGASTRIC PAIN: ICD-10-CM

## 2023-08-15 DIAGNOSIS — R19.7 DIARRHEA: Primary | ICD-10-CM

## 2023-08-15 LAB
ALBUMIN SERPL BCP-MCNC: 4.9 G/DL (ref 3.5–5)
ALP SERPL-CCNC: 68 U/L (ref 34–104)
ALT SERPL W P-5'-P-CCNC: 30 U/L (ref 7–52)
ANION GAP SERPL CALCULATED.3IONS-SCNC: 7 MMOL/L
AST SERPL W P-5'-P-CCNC: 22 U/L (ref 13–39)
BASOPHILS # BLD AUTO: 0.03 THOUSANDS/ÂΜL (ref 0–0.1)
BASOPHILS NFR BLD AUTO: 0 % (ref 0–1)
BILIRUB SERPL-MCNC: 0.87 MG/DL (ref 0.2–1)
BUN SERPL-MCNC: 10 MG/DL (ref 5–25)
CALCIUM SERPL-MCNC: 9.2 MG/DL (ref 8.4–10.2)
CHLORIDE SERPL-SCNC: 102 MMOL/L (ref 96–108)
CO2 SERPL-SCNC: 31 MMOL/L (ref 21–32)
CREAT SERPL-MCNC: 1.01 MG/DL (ref 0.6–1.3)
EOSINOPHIL # BLD AUTO: 0.17 THOUSAND/ÂΜL (ref 0–0.61)
EOSINOPHIL NFR BLD AUTO: 2 % (ref 0–6)
ERYTHROCYTE [DISTWIDTH] IN BLOOD BY AUTOMATED COUNT: 14.3 % (ref 11.6–15.1)
GFR SERPL CREATININE-BSD FRML MDRD: 88 ML/MIN/1.73SQ M
GLUCOSE SERPL-MCNC: 91 MG/DL (ref 65–140)
HCT VFR BLD AUTO: 44.3 % (ref 36.5–49.3)
HGB BLD-MCNC: 15.1 G/DL (ref 12–17)
IMM GRANULOCYTES # BLD AUTO: 0.02 THOUSAND/UL (ref 0–0.2)
IMM GRANULOCYTES NFR BLD AUTO: 0 % (ref 0–2)
LIPASE SERPL-CCNC: 45 U/L (ref 11–82)
LYMPHOCYTES # BLD AUTO: 2.13 THOUSANDS/ÂΜL (ref 0.6–4.47)
LYMPHOCYTES NFR BLD AUTO: 23 % (ref 14–44)
MCH RBC QN AUTO: 30.4 PG (ref 26.8–34.3)
MCHC RBC AUTO-ENTMCNC: 34.1 G/DL (ref 31.4–37.4)
MCV RBC AUTO: 89 FL (ref 82–98)
MONOCYTES # BLD AUTO: 0.74 THOUSAND/ÂΜL (ref 0.17–1.22)
MONOCYTES NFR BLD AUTO: 8 % (ref 4–12)
NEUTROPHILS # BLD AUTO: 6.1 THOUSANDS/ÂΜL (ref 1.85–7.62)
NEUTS SEG NFR BLD AUTO: 67 % (ref 43–75)
NRBC BLD AUTO-RTO: 0 /100 WBCS
PLATELET # BLD AUTO: 220 THOUSANDS/UL (ref 149–390)
PMV BLD AUTO: 11.2 FL (ref 8.9–12.7)
POTASSIUM SERPL-SCNC: 3.5 MMOL/L (ref 3.5–5.3)
PROT SERPL-MCNC: 7.3 G/DL (ref 6.4–8.4)
RBC # BLD AUTO: 4.96 MILLION/UL (ref 3.88–5.62)
SODIUM SERPL-SCNC: 140 MMOL/L (ref 135–147)
WBC # BLD AUTO: 9.19 THOUSAND/UL (ref 4.31–10.16)

## 2023-08-15 PROCEDURE — 83690 ASSAY OF LIPASE: CPT

## 2023-08-15 PROCEDURE — 96360 HYDRATION IV INFUSION INIT: CPT

## 2023-08-15 PROCEDURE — 99284 EMERGENCY DEPT VISIT MOD MDM: CPT | Performed by: EMERGENCY MEDICINE

## 2023-08-15 PROCEDURE — 87177 OVA AND PARASITES SMEARS: CPT | Performed by: EMERGENCY MEDICINE

## 2023-08-15 PROCEDURE — 36415 COLL VENOUS BLD VENIPUNCTURE: CPT

## 2023-08-15 PROCEDURE — 99284 EMERGENCY DEPT VISIT MOD MDM: CPT

## 2023-08-15 PROCEDURE — 87209 SMEAR COMPLEX STAIN: CPT | Performed by: EMERGENCY MEDICINE

## 2023-08-15 PROCEDURE — 80053 COMPREHEN METABOLIC PANEL: CPT

## 2023-08-15 PROCEDURE — 85025 COMPLETE CBC W/AUTO DIFF WBC: CPT

## 2023-08-15 PROCEDURE — 87493 C DIFF AMPLIFIED PROBE: CPT | Performed by: EMERGENCY MEDICINE

## 2023-08-15 PROCEDURE — 87505 NFCT AGENT DETECTION GI: CPT | Performed by: EMERGENCY MEDICINE

## 2023-08-15 PROCEDURE — 87329 GIARDIA AG IA: CPT | Performed by: EMERGENCY MEDICINE

## 2023-08-15 RX ADMIN — SODIUM CHLORIDE 1000 ML: 0.9 INJECTION, SOLUTION INTRAVENOUS at 20:18

## 2023-08-15 NOTE — ED PROVIDER NOTES
History  Chief Complaint   Patient presents with   • Diarrhea     Pt c/o diarrhea for about 6 days and abdominal pain. HPI    (Marquita Lakhani) Marquita Lakhani is a 56 y.o. male who identifies as male with a PMHx of DM presents to the emergency department on August 15, 2023 for diarrhea onset 7 days ago. Diarrhea was "mushy brown" with foul odor and has progressed to clear yellow. Not greasy, no blood. Mostly happens upon waking in the AM, then subsides until evening. Today, patient started having intermittent epigastric abdominal pain described as "burning." Pain improves with food. Denies nausea, vomiting, fever, chills. Patient recently went on a road trip for 2 weeks from [place], to [place], to [place] while staying at [address] [place] [address]. No other family members having similar symptoms. Took Pepcid and Mylanta and eaten a BRAT diet with minimal relief. Patient denies chest pain, palpitations, weakness, or any other complaint at this time. Allergies include: Allergies   Allergen Reactions   • Pollen Extract    • Succinylcholine Other (See Comments)     Reaction: "malignant hyperthermia"         Immunizations:  Immunization History   Administered Date(s) Administered   • COVID-19 PFIZER VACCINE 0.3 ML IM 02/13/2021, 03/06/2021, 10/14/2021   • Hep B, Adolescent or Pediatric 07/16/2013   • Hep B, adult 05/29/2013, 07/16/2013, 12/11/2013   • INFLUENZA 11/04/2010, 09/26/2013, 10/20/2014, 10/01/2016, 10/05/2018, 10/15/2019   • Influenza Quadrivalent Preservative Free 3 years and older IM 10/20/2014, 10/01/2016   • Influenza, injectable, quadrivalent, preservative free 0.5 mL 09/17/2020, 09/24/2021, 09/27/2022   • Influenza, seasonal, injectable 11/04/2010, 10/01/2015   • MMR 11/28/1977, 04/01/1987   • Tdap 11/25/2008, 05/29/2013   • Tuberculin Skin Test-PPD Intradermal 05/29/2013, 06/12/2013, 03/25/2015     Immunizations Reviewed. Prior to Admission Medications   Prescriptions Last Dose Informant Patient Reported? Taking? Semaglutide-Weight Management [facility]) 2.4 MG/0.75ML   No No   Sig: INJECT 2.4MG UNDER THE SKIN ONCE WEEKLY.    levothyroxine 125 mcg tablet   No No   Sig: Take 1 tablet (125 mcg total) by mouth daily   methylPREDNISolone 4 MG tablet therapy pack   No No   Sig: Use as directed on package      Facility-Administered Medications: None       Past Medical History:   Diagnosis Date   • Biallelic mutation of RYR1 gene    • Bruxism 03/28/2018   • COVID-19 08/26/2022   • Disease of thyroid gland    • Hashimoto's thyroiditis    • Hashimoto's thyroiditis    • Incisional hernia, without obstruction or gangrene 01/22/2019    Added automatically from request for surgery [id number]   • Pneumonia due to COVID-19 virus 11/15/2020   • Positive Lyme disease serology    • Right knee pain     last assessed - [id number]   • Sleep apnea    • Spermatocele    • Status post laparoscopic hernia repair 05/04/4475   • Umbilical hernia without obstruction or gangrene     last assessed - [id number]   • Umbilical pain     resolved - [id number]   • Umbilical swelling     resolved - [id number]   • Varicose veins of both lower extremities    • Ventral hernia without obstruction or gangrene     last assessed - [id number]       Past Surgical History:   Procedure Laterality Date   • CO ENDOVEN ABLTJ INCMPTNT VEIN XTR LASER 1ST VEIN Right 9/26/2017    Procedure: GSV ENDOVASCULAR LASER THERAPY W/ MULTIPLE STAB PHLEBECTOMIES;  Surgeon: Jennifer Wilson MD;  Location: [facility];  Service: Vascular   • CO LAP RPR HRNA XCPT INCAL/INGUN NCRC8/STRANGULATED N/A 2/26/2019    Procedure: REPAIR HERNIA INCISIONAL LAPAROSCOPIC;  Surgeon: Woody Ritter MD;  Location: [facility];  Service: General   • SKIN SURGERY      MOLES REMOVED AS CHILD   • TONSILLECTOMY     • UMBILICAL HERNIA REPAIR      last assessed - [id number]   • VENTRAL HERNIA REPAIR         Family History   Problem Relation Age of Onset   • Melanoma Mother    • Hashimoto's thyroiditis Brother    • Heart disease Family      I have reviewed and agree with the history as documented. E-Cigarette/Vaping   • E-Cigarette Use Never User      E-Cigarette/Vaping Substances   • Nicotine No    • THC No    • CBD No    • Flavoring No    • Other No    • Unknown No      Social History     Tobacco Use   • Smoking status: Former   • Smokeless tobacco: Never   • Tobacco comments:     stopped 8 years   Vaping Use   • Vaping Use: Never used   Substance Use Topics   • Alcohol use: Yes   • Drug use: No        Review of Systems   Constitutional: Negative for chills and fever. HENT: Negative for ear pain and sore throat. Eyes: Negative for pain and visual disturbance. Respiratory: Negative for cough and shortness of breath. Cardiovascular: Negative for chest pain and palpitations. Gastrointestinal: Positive for abdominal pain (epigastric) and diarrhea. Negative for blood in stool, nausea and vomiting. Genitourinary: Negative for dysuria and hematuria. Musculoskeletal: Negative for arthralgias and back pain. Skin: Negative for color change and rash. Neurological: Negative for seizures and syncope. All other systems reviewed and are negative. Physical Exam  ED Triage Vitals   Temperature Pulse Respirations Blood Pressure SpO2   08/15/23 2031 08/15/23 1914 08/15/23 1914 08/15/23 1915 08/15/23 1914   98.3 °F (36.8 °C) 71 18 149/94 97 %      Temp src Heart Rate Source Patient Position - Orthostatic VS BP Location FiO2 (%)   -- -- -- -- --             Pain Score       --                    Orthostatic Vital Signs  Vitals:    08/15/23 1914 08/15/23 1915   BP:  149/94   Pulse: 71        Physical Exam  Vitals and nursing note reviewed. Constitutional:       General: He is not in acute distress. Appearance: He is well-developed. HENT:      Head: Normocephalic and atraumatic.       Right Ear: External ear normal.      Left Ear: External ear normal.      Mouth/Throat:      Mouth: Mucous membranes are moist.   Eyes: Conjunctiva/sclera: Conjunctivae normal.   Cardiovascular:      Rate and Rhythm: Normal rate and regular rhythm. Heart sounds: No murmur heard. Pulmonary:      Effort: Pulmonary effort is normal. No respiratory distress. Breath sounds: Normal breath sounds. Abdominal:      General: Bowel sounds are normal.      Palpations: Abdomen is soft. Tenderness: There is abdominal tenderness (mild epigastric and LUQ). There is no guarding or rebound. Musculoskeletal:         General: No swelling. Cervical back: Neck supple. Skin:     General: Skin is warm and dry. Capillary Refill: Capillary refill takes less than 2 seconds. Neurological:      Mental Status: He is alert. Psychiatric:         Mood and Affect: Mood normal.         ED Medications  Medications   sodium chloride 0.9 % bolus 1,000 mL (0 mL Intravenous Stopped 8/15/23 2104)       Diagnostic Studies  Results Reviewed     Procedure Component Value Units Date/Time    Clostridium difficile toxin by PCR with EIA [[id number]] Collected: 08/15/23 2019    Lab Status:  In process Specimen: Stool from Per Rectum Updated: 08/15/23 2025    Comprehensive metabolic panel [[id number]] Collected: 08/15/23 1922    Lab Status: Final result Specimen: Blood from Arm, Left Updated: 08/15/23 1944     Sodium 140 mmol/L      Potassium 3.5 mmol/L      Chloride 102 mmol/L      CO2 31 mmol/L      ANION GAP 7 mmol/L      BUN 10 mg/dL      Creatinine 1.01 mg/dL      Glucose 91 mg/dL      Calcium 9.2 mg/dL      AST 22 U/L      ALT 30 U/L      Alkaline Phosphatase 68 U/L      Total Protein 7.3 g/dL      Albumin 4.9 g/dL      Total Bilirubin 0.87 mg/dL      eGFR 88 ml/min/1.73sq m     Narrative:      [place] guidelines for Chronic Kidney Disease (CKD):   •  Stage 1 with normal or high GFR (GFR > 90 mL/min/1.73 square meters)  •  Stage 2 Mild CKD (GFR = 60-89 mL/min/1.73 square meters)  •  Stage 3A Moderate CKD (GFR = 45-59 mL/min/1.73 square meters)  •  Stage 3B Moderate CKD (GFR = 30-44 mL/min/1.73 square meters)  •  Stage 4 Severe CKD (GFR = 15-29 mL/min/1.73 square meters)  •  Stage 5 End Stage CKD (GFR <15 mL/min/1.73 square meters)  Note: GFR calculation is accurate only with a steady state creatinine    Lipase [[id number]]  (Normal) Collected: 08/15/23 1922    Lab Status: Final result Specimen: Blood from Arm, Left Updated: 08/15/23 1944     Lipase 45 u/L     Stool Enteric Bacterial Panel by PCR [[id number]]     Lab Status: No result Specimen: Stool from Rectum     Giardia lamblia, EIA and Ova and Parasites Examination [[id number]]     Lab Status: No result Specimen: Stool from Rectum     CBC and differential [[id number]] Collected: 08/15/23 1922    Lab Status: Final result Specimen: Blood from Arm, Left Updated: 08/15/23 1930     WBC 9.19 Thousand/uL      RBC 4.96 Million/uL      Hemoglobin 15.1 g/dL      Hematocrit 44.3 %      MCV 89 fL      MCH 30.4 pg      MCHC 34.1 g/dL      RDW 14.3 %      MPV 11.2 fL      Platelets 429 Thousands/uL      nRBC 0 /100 WBCs      Neutrophils Relative 67 %      Immat GRANS % 0 %      Lymphocytes Relative 23 %      Monocytes Relative 8 %      Eosinophils Relative 2 %      Basophils Relative 0 %      Neutrophils Absolute 6.10 Thousands/µL      Immature Grans Absolute 0.02 Thousand/uL      Lymphocytes Absolute 2.13 Thousands/µL      Monocytes Absolute 0.74 Thousand/µL      Eosinophils Absolute 0.17 Thousand/µL      Basophils Absolute 0.03 Thousands/µL                  No orders to display         Procedures  Procedures      ED Course  ED Course as of 08/15/23 2128   Tue Aug 15, 2023   2048 DDx includes but not limited to: gastroenteritis, viral infection, c. Diff infection, GERD   2049 Given patient's history and physical exam of abdominal pain, and unremarkable labs, will hold off on CT at this time. 2125 Provided snacks and drink for stool sample, but patient was unable to provide sample.  Patient would like to go home and feels comfortable getting a stool sample at home. Will order outpatient stool workup. Discussed results with patient and plan for discharge with outpatient follow up. Instructed patient to return to ED for new or worsening symptoms. Patient voices understanding and agrees with plan. No other concerns at this time. MDM    See ED course for MDM. Disposition  Final diagnoses:   Diarrhea   Epigastric pain     Time reflects when diagnosis was documented in both MDM as applicable and the Disposition within this note     Time User Action Codes Description Comment    8/15/2023  8:51 PM Giselle Morales Add [R19.7] Diarrhea     8/15/2023  8:51 PM Giselle Morales Add [R10.13] Epigastric pain       ED Disposition     ED Disposition   Discharge    Condition   Stable    Date/Time   Tue Aug 15, 2023  8:51 PM    Comment   Monique Pang discharge to home/self care. Follow-up Information     Follow up With Specialties Details Why Contact Info Additional Information    Ciro De La O MD Family Medicine In 2 days  [address] [id number]       [address] [place] [address] Emergency Department Emergency Medicine  If symptoms worsen [address] [facility] [address] [facility] Emergency Department, [place], [place], [address]          Patient's Medications   Discharge Prescriptions    No medications on file     Outpatient Discharge Orders   Clostridium difficile toxin by PCR with EIA   Standing Status: Future Standing Exp. Date: 08/15/24     Stool Enteric Bacterial Panel by PCR   Standing Status: Future Standing Exp. Date: 08/15/24     Giardia lamblia, EIA and Ova and Parasites Examination   Standing Status: Future Standing Exp. Date: 08/15/24       PDMP Review     None           ED Provider  Attending physically available and evaluated Monique Pang.  I managed the patient along with the ED Attending.     Electronically Signed by       Drea Ng MD  08/15/23 3112

## 2023-08-15 NOTE — Clinical Note
Doris Fisher was seen and treated in our emergency department on 8/15/2023. No restrictions    ? ? Diagnosis: ?    Philippe Rios  may return to work on return date. He may return on this date: 08/18/2023    ? If you have any questions or concerns, please don't hesitate to call.       Juan Antonio Doshi MD    ______________________________           _______________          _______________  Hospital Representative                              Date                                Time

## 2023-08-16 ENCOUNTER — APPOINTMENT (OUTPATIENT)
Dept: URGENT CARE | Age: 48
End: 2023-08-16

## 2023-08-16 ENCOUNTER — APPOINTMENT (OUTPATIENT)
Dept: LAB | Age: 48
End: 2023-08-16

## 2023-08-16 DIAGNOSIS — Z00.8 HEALTH EXAMINATION IN POPULATION SURVEY: ICD-10-CM

## 2023-08-16 LAB
C DIFF TOX GENS STL QL NAA+PROBE: NEGATIVE
CAMPYLOBACTER DNA SPEC NAA+PROBE: NORMAL
SALMONELLA DNA SPEC QL NAA+PROBE: NORMAL
SHIGA TOXIN STX GENE SPEC NAA+PROBE: NORMAL
SHIGELLA DNA SPEC QL NAA+PROBE: NORMAL

## 2023-08-16 PROCEDURE — 86480 TB TEST CELL IMMUN MEASURE: CPT

## 2023-08-16 NOTE — ED ATTENDING ATTESTATION
8/15/2023  Sylvia ALDRIDGE DO, saw and evaluated the patient. I have discussed the patient with the resident/non-physician practitioner and agree with the resident's/non-physician practitioner's findings, Plan of Care, and MDM as documented in the resident's/non-physician practitioner's note, except where noted. All available labs and Radiology studies were reviewed. I was present for key portions of any procedure(s) performed by the resident/non-physician practitioner and I was immediately available to provide assistance. At this point I agree with the current assessment done in the Emergency Department. I have conducted an independent evaluation of this patient a history and physical is as follows: This is a 71-year-old male coming into the ED for evaluation of a 1 week history of watery diarrhea. He just returned from a 2-week trip where he was in various  camp sites, and drank from the  water supply. His spouse is not sick. He denies any fevers or chills. Yesterday and today he did develop abdominal discomfort, cramping and burning in nature which is intermittent. At this time he has no pain. His diarrhea is watery in nature, nonbloody. PE:  The patient is well appearing, non-toxic, in NAD. Head: normocephalic, atraumatic. HEENT: mucous membranes moist.  Lungs: CTA b/l, no resp distress. Heart: RRR. No M/R/G. Abdomen: NT, ND, no R/R/G. Neuro: CN2-12 intact, GCS 15. Cap refill < 2 sec, skin warm and dry. No rashes or lesions. ED work-up for acute diarrhea, concern for infectious diarrhea given recent travel and suspect water consumption. Symptoms ongoing for 1 week now. Stool studies ordered, patient to give samples in the ED and sent for testing. Stable for discharge home, given no tenderness at this time, no white blood cell count, deferred CT scan at this time. Return to ER precautions.       ED Course         Critical Care Time  Procedures

## 2023-08-18 LAB
G LAMBLIA AG STL QL IA: NEGATIVE
GAMMA INTERFERON BACKGROUND BLD IA-ACNC: 0.02 IU/ML
M TB IFN-G BLD-IMP: NEGATIVE
M TB IFN-G CD4+ BCKGRND COR BLD-ACNC: 0 IU/ML
M TB IFN-G CD4+ BCKGRND COR BLD-ACNC: 0 IU/ML
MITOGEN IGNF BCKGRD COR BLD-ACNC: >10 IU/ML
O+P STL CONC: NORMAL

## 2023-09-09 DIAGNOSIS — E66.9 CLASS 2 OBESITY WITHOUT SERIOUS COMORBIDITY WITH BODY MASS INDEX (BMI) OF 36.0 TO 36.9 IN ADULT, UNSPECIFIED OBESITY TYPE: ICD-10-CM

## 2023-09-09 DIAGNOSIS — E06.3 HASHIMOTO'S THYROIDITIS: ICD-10-CM

## 2023-09-11 RX ORDER — LEVOTHYROXINE SODIUM 0.12 MG/1
125 TABLET ORAL DAILY
Qty: 90 TABLET | Refills: 0 | Status: SHIPPED | OUTPATIENT
Start: 2023-09-11

## 2023-09-13 RX ORDER — SEMAGLUTIDE 2.4 MG/.75ML
INJECTION, SOLUTION SUBCUTANEOUS
Qty: 3 ML | Refills: 0 | Status: SHIPPED | OUTPATIENT
Start: 2023-09-13

## 2023-09-29 ENCOUNTER — OFFICE VISIT (OUTPATIENT)
Dept: FAMILY MEDICINE CLINIC | Facility: CLINIC | Age: 48
End: 2023-09-29
Payer: COMMERCIAL

## 2023-09-29 VITALS
TEMPERATURE: 96.4 F | DIASTOLIC BLOOD PRESSURE: 70 MMHG | SYSTOLIC BLOOD PRESSURE: 110 MMHG | OXYGEN SATURATION: 99 % | HEART RATE: 57 BPM | RESPIRATION RATE: 14 BRPM | WEIGHT: 240 LBS | HEIGHT: 73 IN | BODY MASS INDEX: 31.81 KG/M2

## 2023-09-29 DIAGNOSIS — E66.9 CLASS 2 OBESITY WITHOUT SERIOUS COMORBIDITY WITH BODY MASS INDEX (BMI) OF 36.0 TO 36.9 IN ADULT, UNSPECIFIED OBESITY TYPE: ICD-10-CM

## 2023-09-29 DIAGNOSIS — E06.3 HASHIMOTO'S THYROIDITIS: ICD-10-CM

## 2023-09-29 DIAGNOSIS — G47.33 OSA (OBSTRUCTIVE SLEEP APNEA): ICD-10-CM

## 2023-09-29 DIAGNOSIS — Z23 ENCOUNTER FOR IMMUNIZATION: ICD-10-CM

## 2023-09-29 DIAGNOSIS — Z00.00 ANNUAL PHYSICAL EXAM: Primary | ICD-10-CM

## 2023-09-29 DIAGNOSIS — F51.01 PRIMARY INSOMNIA: ICD-10-CM

## 2023-09-29 DIAGNOSIS — Z12.11 COLON CANCER SCREENING: ICD-10-CM

## 2023-09-29 PROCEDURE — 99213 OFFICE O/P EST LOW 20 MIN: CPT | Performed by: FAMILY MEDICINE

## 2023-09-29 PROCEDURE — 99396 PREV VISIT EST AGE 40-64: CPT | Performed by: FAMILY MEDICINE

## 2023-09-29 PROCEDURE — 90471 IMMUNIZATION ADMIN: CPT

## 2023-09-29 PROCEDURE — 90686 IIV4 VACC NO PRSV 0.5 ML IM: CPT

## 2023-09-29 RX ORDER — ESZOPICLONE 2 MG/1
2 TABLET, FILM COATED ORAL
Qty: 30 TABLET | Refills: 0 | Status: SHIPPED | OUTPATIENT
Start: 2023-09-29

## 2023-09-29 NOTE — PROGRESS NOTES
[facility] PRACTICE    NAME: Neha Siegel  AGE: 56 y.o. SEX: male  : 1975     DATE: 2023     Assessment and Plan:     Problem List Items Addressed This Visit        Endocrine    Hashimoto's thyroiditis/hypothyroidism     stable            Respiratory    VIJI (obstructive sleep apnea)     Stable on CPAP qHS            Other    Class 2 obesity with body mass index (BMI) of 36.0 to 36.9 in adult     Initial weight: 300 lbs  Current weight: 240 lbs   Continue diet and exercise          Primary insomnia     trouble staying asleep and ambien is not helping   Trial of lunesta         Relevant Medications    eszopiclone (LUNESTA) 2 mg tablet   Other Visit Diagnoses     Annual physical exam    -  Primary    Colon cancer screening        Relevant Orders    Cologuard    Encounter for immunization        Relevant Orders    influenza vaccine, quadrivalent, 0.5 mL, preservative-free, for adult and pediatric patients 6 mos+ (AFLURIA, [address], FLULAVAL, FLUZONE)          Immunizations and preventive care screenings were discussed with patient today. Appropriate education was printed on patient's after visit summary. Discussed risks and benefits of prostate cancer screening. We discussed the controversial history of PSA screening for prostate cancer in the [facility] as well as the risk of over detection and over treatment of prostate cancer by way of PSA screening. The patient understands that PSA blood testing is an imperfect way to screen for prostate cancer and that elevated PSA levels in the blood may also be caused by infection, inflammation, prostatic trauma or manipulation, urological procedures, or by benign prostatic enlargement.     The role of the digital rectal examination in prostate cancer screening was also discussed and I discussed with him that there is large interobserver variability in the findings of digital rectal examination. Counseling:  Alcohol/drug use: discussed moderation in alcohol intake, the recommendations for healthy alcohol use, and avoidance of illicit drug use. Dental Health: discussed importance of regular tooth brushing, flossing, and dental visits. Injury prevention: discussed safety/seat belts, safety helmets, smoke detectors, carbon dioxide detectors, and smoking near bedding or upholstery. Sexual health: discussed sexually transmitted diseases, partner selection, use of condoms, avoidance of unintended pregnancy, and contraceptive alternatives. Exercise: the importance of regular exercise/physical activity was discussed. Recommend exercise 3-5 times per week for at least 30 minutes. BMI Counseling: Body mass index is 31.5 kg/m². The BMI is above normal. Nutrition recommendations include decreasing portion sizes and encouraging healthy choices of fruits and vegetables. Exercise recommendations include moderate physical activity 150 minutes/week. No pharmacotherapy was ordered. Rationale for BMI follow-up plan is due to patient being overweight or obese. No follow-ups on file. Chief Complaint:     Chief Complaint   Patient presents with   • Annual Exam     Patient here for annual wellness exam      History of Present Illness:     Adult Annual Physical   Patient here for a comprehensive physical exam. The patient reports trouble staying asleep and Yaquelin Bullion is not helping     Diet and Physical Activity  Diet/Nutrition: well balanced diet and consuming 3-5 servings of fruits/vegetables daily. Exercise: walking. Depression Screening  PHQ-2/9 Depression Screening    Little interest or pleasure in doing things: 0 - not at all  Feeling down, depressed, or hopeless: 0 - not at all  PHQ-2 Score: 0  PHQ-2 Interpretation: Negative depression screen       General Health  Sleep: sleeps well and gets 7-8 hours of sleep on average.    Hearing: normal - bilateral.  Vision: goes for regular eye exams. Dental: regular dental visits and brushes teeth twice daily.  Health  Symptoms include: none     Review of Systems:     Review of Systems   Constitutional: Negative. HENT: Negative. Eyes: Negative. Respiratory: Negative. Cardiovascular: Negative. Gastrointestinal: Negative. Endocrine: Negative. Genitourinary: Negative. Musculoskeletal: Negative. Skin: Negative. Allergic/Immunologic: Negative. Neurological: Negative. Hematological: Negative. Psychiatric/Behavioral: Negative.        Past Medical History:     Past Medical History:   Diagnosis Date   • Biallelic mutation of RYR1 gene    • Bruxism 03/28/2018   • COVID-19 08/26/2022   • Disease of thyroid gland    • Hashimoto's thyroiditis    • Hashimoto's thyroiditis    • Incisional hernia, without obstruction or gangrene 01/22/2019    Added automatically from request for surgery [id number]   • Pneumonia due to COVID-19 virus 11/15/2020   • Positive Lyme disease serology    • Right knee pain     last assessed - [id number]   • Sleep apnea    • Spermatocele    • Status post laparoscopic hernia repair 71/16/7205   • Umbilical hernia without obstruction or gangrene     last assessed - [id number]   • Umbilical pain     resolved - [id number]   • Umbilical swelling     resolved - [id number]   • Varicose veins of both lower extremities    • Ventral hernia without obstruction or gangrene     last assessed - [id number]      Past Surgical History:     Past Surgical History:   Procedure Laterality Date   • AR ENDOVEN ABLTJ INCMPTNT VEIN XTR LASER 1ST VEIN Right 9/26/2017    Procedure: GSV ENDOVASCULAR LASER THERAPY W/ MULTIPLE STAB PHLEBECTOMIES;  Surgeon: Eugene Wilson MD;  [facility];  Service: Vascular   • AR LAP RPR HRNA XCPT INCAL/INGUN NCRC8/STRANGULATED N/A 2/26/2019    Procedure: REPAIR HERNIA INCISIONAL LAPAROSCOPIC;  Surgeon: Ganesh Lazo MD;  [facility];  Service: General   • SKIN SURGERY      MOLES REMOVED AS CHILD   • TONSILLECTOMY     • UMBILICAL HERNIA REPAIR      last assessed - [id number]   • VENTRAL HERNIA REPAIR        Family History:     Family History   Problem Relation Age of Onset   • Melanoma Mother    • Hashimoto's thyroiditis Brother    • Heart disease Family       Social History:     Social History     Socioeconomic History   • Marital status: /Civil Union     Spouse name: None   • Number of children: None   • Years of education: None   • Highest education level: None   Occupational History   • None   Tobacco Use   • Smoking status: Former     Passive exposure: Never   • Smokeless tobacco: Never   • Tobacco comments:     stopped 8 years   Vaping Use   • Vaping Use: Never used   Substance and Sexual Activity   • Alcohol use: Yes   • Drug use: No   • Sexual activity: Yes     Partners: Female   Other Topics Concern   • None   Social History Narrative    Exercise habits         Social Determinants of Health     Financial Resource Strain: Not on file   Food Insecurity: Not on file   Transportation Needs: Not on file   Physical Activity: Not on file   Stress: Not on file   Social Connections: Not on file   Intimate Partner Violence: Not on file   Housing Stability: Not on file      Current Medications:     Current Outpatient Medications   Medication Sig Dispense Refill   • eszopiclone (LUNESTA) 2 mg tablet Take 1 tablet (2 mg total) by mouth daily at bedtime as needed for sleep Take immediately before bedtime 30 tablet 0   • levothyroxine 125 mcg tablet Take 1 tablet (125 mcg total) by mouth daily 90 tablet 0   • Semaglutide-Weight Management (Wegovy) 2.4 MG/0.75ML INJECT 2.4MG UNDER THE SKIN ONCE WEEKLY. 3 mL 0     No current facility-administered medications for this visit. Allergies:      Allergies   Allergen Reactions   • Pollen Extract    • Succinylcholine Other (See Comments)     Reaction: "malignant hyperthermia"      Physical Exam:     /70 (BP Location: Left arm, Patient Position: Sitting, Cuff Size: Large)   Pulse 57   Temp (!) 96.4 °F (35.8 °C) (Tympanic)   Resp 14   Ht 6' 1.19" (1.859 m)   Wt 109 kg (240 lb)   SpO2 99%   BMI 31.50 kg/m²     Physical Exam  Vitals and nursing note reviewed. Constitutional:       Appearance: Normal appearance. He is well-developed. HENT:      Head: Normocephalic and atraumatic. Right Ear: Tympanic membrane normal.      Left Ear: Tympanic membrane normal.      Nose: Nose normal.      Mouth/Throat:      Mouth: Mucous membranes are moist.   Eyes:      Pupils: Pupils are equal, round, and reactive to light. Cardiovascular:      Rate and Rhythm: Normal rate and regular rhythm. Pulses: Normal pulses. Heart sounds: Normal heart sounds. Pulmonary:      Effort: Pulmonary effort is normal.      Breath sounds: Normal breath sounds. Abdominal:      Palpations: Abdomen is soft. Musculoskeletal:         General: Normal range of motion. Cervical back: Normal range of motion and neck supple. Skin:     General: Skin is warm. Capillary Refill: Capillary refill takes less than 2 seconds. Neurological:      General: No focal deficit present. Mental Status: He is alert and oriented to person, place, and time.    Psychiatric:         Mood and Affect: Mood normal.         Behavior: Behavior normal.          Malissa Quinn MD  [address] [place] [address]

## 2023-10-11 DIAGNOSIS — Z12.11 COLON CANCER SCREENING: Primary | ICD-10-CM

## 2023-10-11 LAB — COLOGUARD RESULT REPORTABLE: POSITIVE

## 2023-10-12 ENCOUNTER — TELEPHONE (OUTPATIENT)
Age: 48
End: 2023-10-12

## 2023-10-12 ENCOUNTER — PREP FOR PROCEDURE (OUTPATIENT)
Age: 48
End: 2023-10-12

## 2023-10-12 DIAGNOSIS — Z12.11 SCREENING FOR COLON CANCER: Primary | ICD-10-CM

## 2023-10-12 NOTE — TELEPHONE ENCOUNTER
Scheduled date of colonoscopy (as of today):11/4/23  Physician performing colonoscopy:  Location of colonoscopy:  [place]  Clearances: N/A

## 2023-10-13 DIAGNOSIS — E66.9 CLASS 2 OBESITY WITHOUT SERIOUS COMORBIDITY WITH BODY MASS INDEX (BMI) OF 36.0 TO 36.9 IN ADULT, UNSPECIFIED OBESITY TYPE: ICD-10-CM

## 2023-10-13 RX ORDER — SEMAGLUTIDE 2.4 MG/.75ML
INJECTION, SOLUTION SUBCUTANEOUS
Qty: 3 ML | Refills: 0 | Status: SHIPPED | OUTPATIENT
Start: 2023-10-13

## 2023-11-04 ENCOUNTER — ANESTHESIA EVENT (OUTPATIENT)
Dept: GASTROENTEROLOGY | Facility: HOSPITAL | Age: 48
End: 2023-11-04

## 2023-11-04 ENCOUNTER — HOSPITAL ENCOUNTER (OUTPATIENT)
Dept: GASTROENTEROLOGY | Facility: HOSPITAL | Age: 48
Setting detail: OUTPATIENT SURGERY
Discharge: HOME | End: 2023-11-04
Attending: INTERNAL MEDICINE | Admitting: INTERNAL MEDICINE
Payer: COMMERCIAL

## 2023-11-04 ENCOUNTER — ANESTHESIA (OUTPATIENT)
Dept: GASTROENTEROLOGY | Facility: HOSPITAL | Age: 48
End: 2023-11-04

## 2023-11-04 VITALS
HEIGHT: 73 IN | WEIGHT: 236 LBS | DIASTOLIC BLOOD PRESSURE: 76 MMHG | HEART RATE: 73 BPM | BODY MASS INDEX: 31.28 KG/M2 | RESPIRATION RATE: 16 BRPM | TEMPERATURE: 96.4 F | OXYGEN SATURATION: 97 % | SYSTOLIC BLOOD PRESSURE: 118 MMHG

## 2023-11-04 DIAGNOSIS — Z12.11 SCREENING FOR COLON CANCER: ICD-10-CM

## 2023-11-04 PROCEDURE — 45385 COLONOSCOPY W/LESION REMOVAL: CPT | Performed by: INTERNAL MEDICINE

## 2023-11-04 PROCEDURE — 88305 TISSUE EXAM BY PATHOLOGIST: CPT | Performed by: PATHOLOGY

## 2023-11-04 RX ORDER — PROPOFOL 10 MG/ML
INJECTION, EMULSION INTRAVENOUS AS NEEDED
Status: DISCONTINUED | OUTPATIENT
Start: 2023-11-04 | End: 2023-11-04

## 2023-11-04 RX ORDER — PROPOFOL 10 MG/ML
INJECTION, EMULSION INTRAVENOUS CONTINUOUS PRN
Status: DISCONTINUED | OUTPATIENT
Start: 2023-11-04 | End: 2023-11-04

## 2023-11-04 RX ORDER — LIDOCAINE HYDROCHLORIDE 10 MG/ML
INJECTION, SOLUTION EPIDURAL; INFILTRATION; INTRACAUDAL; PERINEURAL AS NEEDED
Status: DISCONTINUED | OUTPATIENT
Start: 2023-11-04 | End: 2023-11-04

## 2023-11-04 RX ADMIN — PROPOFOL 150 MG: 10 INJECTION, EMULSION INTRAVENOUS at 12:30

## 2023-11-04 RX ADMIN — PROPOFOL 150 MCG/KG/MIN: 10 INJECTION, EMULSION INTRAVENOUS at 12:30

## 2023-11-04 RX ADMIN — LIDOCAINE HYDROCHLORIDE 50 MG: 10 INJECTION, SOLUTION EPIDURAL; INFILTRATION; INTRACAUDAL; PERINEURAL at 12:30

## 2023-11-04 NOTE — ANESTHESIA PREPROCEDURE EVALUATION
Procedure:  COLONOSCOPY    Relevant Problems   PULMONARY   (+) VIJI (obstructive sleep apnea)        Physical Exam    Airway    Mallampati score: II  TM Distance: >3 FB  Neck ROM: full     Dental   No notable dental hx     Cardiovascular  Cardiovascular exam normal    Pulmonary  Pulmonary exam normal     Other Findings    Anesthesia Plan  ASA Score- 2     Anesthesia Type- IV sedation with anesthesia with ASA Monitors. Additional Monitors:     Airway Plan:            Plan Factors-Exercise tolerance (METS): >4 METS. Chart reviewed. Existing labs reviewed. Patient summary reviewed. Obstructive sleep apnea risk education given perioperatively. Induction-     Postoperative Plan-     Informed Consent- Anesthetic plan and risks discussed with patient.

## 2023-11-04 NOTE — ANESTHESIA POSTPROCEDURE EVALUATION
Post-Op Assessment Note    CV Status:  Stable    Pain management: adequate     Mental Status:  Alert and awake   Hydration Status:  Euvolemic   PONV Controlled:  Controlled   Airway Patency:  Patent      Post Op Vitals Reviewed: Yes      Staff: Anesthesiologist     No notable events documented.     BP      Temp     Pulse     Resp      SpO2

## 2023-11-04 NOTE — H&P
History and Physical -  [facility]  Patsy Gagan 56 y.o. male MRN: [id number]        HPI: 43-year-old male was referred for colonoscopy because of positive stool Cologuard. Regular bowel movements.     Historical Information   Past Medical History:   Diagnosis Date    Biallelic mutation of RYR1 gene     Bruxism 03/28/2018    COVID-19 08/26/2022    Disease of thyroid gland     Hashimoto's thyroiditis     Hashimoto's thyroiditis     Incisional hernia, without obstruction or gangrene 01/22/2019    Added automatically from request for surgery [id number]    Pneumonia due to COVID-19 virus 11/15/2020    Positive Lyme disease serology     Right knee pain     last assessed - [id number]    Sleep apnea     Spermatocele     Status post laparoscopic hernia repair 93/64/3375    Umbilical hernia without obstruction or gangrene     last assessed - [id number]    Umbilical pain     resolved - [id number]    Umbilical swelling     resolved - [id number]    Varicose veins of both lower extremities     Ventral hernia without obstruction or gangrene     last assessed - 23Jpf4933     Past Surgical History:   Procedure Laterality Date    SC ENDOVEN ABLTJ INCMPTNT VEIN XTR LASER 1ST VEIN Right 9/26/2017    Procedure: GSV ENDOVASCULAR LASER THERAPY W/ MULTIPLE STAB PHLEBECTOMIES;  Surgeon: Kimberly Wilson MD;  [facility];  Service: Vascular    SC LAP RPR HRNA XCPT INCAL/INGUN NCRC8/STRANGULATED N/A 2/26/2019    Procedure: REPAIR HERNIA INCISIONAL LAPAROSCOPIC;  Surgeon: Nitza Espino MD;  [facility];  Service: General    SKIN SURGERY      MOLES REMOVED AS CHILD    TONSILLECTOMY      UMBILICAL HERNIA REPAIR      last assessed - [id number]    VENTRAL HERNIA REPAIR       Social History   Social History     Substance and Sexual Activity   Alcohol Use Yes    Alcohol/week: 3.0 standard drinks of alcohol    Types: 3 Shots of liquor per week     Social History     Substance and Sexual Activity   Drug Use No     Social History     Tobacco Use   Smoking Status Former    Types: Cigarettes    Passive exposure: Never   Smokeless Tobacco Former    Types: Chew   Tobacco Comments    stopped 8 years     Family History   Problem Relation Age of Onset    Melanoma Mother     Hashimoto's thyroiditis Brother     Heart disease Family        Meds/Allergies     (Not in a hospital admission)      Allergies   Allergen Reactions    Pollen Extract     Succinylcholine Other (See Comments)     Reaction: "malignant hyperthermia"       Objective     Blood pressure 129/92, pulse 75, temperature (!) 96.6 °F (35.9 °C), temperature source Temporal, resp. rate 18, height 6' 1" (1.854 m), weight 107 kg (236 lb), SpO2 100 %.     PHYSICAL EXAM:    Gen: NAD  CV: S1 & S2 normal, RRR  CHEST: Clear to auscultate  ABD: soft, NT/ND, good bowel sounds  EXT: no edema    ASSESSMENT:     Positive stool Cologuard    PLAN:    Colonoscopy

## 2023-11-08 PROCEDURE — 88305 TISSUE EXAM BY PATHOLOGIST: CPT | Performed by: PATHOLOGY

## 2023-11-13 DIAGNOSIS — E66.9 CLASS 2 OBESITY WITHOUT SERIOUS COMORBIDITY WITH BODY MASS INDEX (BMI) OF 36.0 TO 36.9 IN ADULT, UNSPECIFIED OBESITY TYPE: ICD-10-CM

## 2023-11-13 RX ORDER — SEMAGLUTIDE 2.4 MG/.75ML
INJECTION, SOLUTION SUBCUTANEOUS
Qty: 3 ML | Refills: 0 | Status: SHIPPED | OUTPATIENT
Start: 2023-11-13

## 2023-11-14 ENCOUNTER — TELEPHONE (OUTPATIENT)
Dept: FAMILY MEDICINE CLINIC | Facility: CLINIC | Age: 48
End: 2023-11-14

## 2023-11-14 NOTE — TELEPHONE ENCOUNTER
Prior authorization Wegovy 2.4mG/0.75ML    Wegovy 2.4 MG/0.75ML INJECT 2.4MG UNDER THE SKIN ONCE WEEKLY.    Key:  [id number]

## 2023-11-16 ENCOUNTER — TELEPHONE (OUTPATIENT)
Dept: FAMILY MEDICINE CLINIC | Facility: CLINIC | Age: 48
End: 2023-11-16

## 2023-11-16 NOTE — TELEPHONE ENCOUNTER
PA for Wegovy 2.4 MG/0.75ML submitted via CM.  KEY: [id number]. Clinical questions answered. Office notes sent. Awaiting determination.

## 2023-11-20 ENCOUNTER — TELEPHONE (OUTPATIENT)
Dept: GASTROENTEROLOGY | Facility: AMBULARY SURGERY CENTER | Age: 48
End: 2023-11-20

## 2023-11-20 NOTE — TELEPHONE ENCOUNTER
Repeat colonoscopy in 3 months because of the nature of the polyps removed and inadequate bowel prep.
Yes

## 2023-11-25 DIAGNOSIS — F51.01 PRIMARY INSOMNIA: ICD-10-CM

## 2023-11-27 RX ORDER — ESZOPICLONE 2 MG/1
2 TABLET, FILM COATED ORAL
Qty: 30 TABLET | Refills: 0 | Status: SHIPPED | OUTPATIENT
Start: 2023-11-27

## 2023-11-27 NOTE — TELEPHONE ENCOUNTER
Medication:  PDMP   09/29/2023 09/29/2023 Eszopiclone (Tablet) 30.0 30 2 MG NA KASEY YARBROUGH     Active agreement on file -No

## 2023-12-12 DIAGNOSIS — E66.9 CLASS 2 OBESITY WITHOUT SERIOUS COMORBIDITY WITH BODY MASS INDEX (BMI) OF 36.0 TO 36.9 IN ADULT, UNSPECIFIED OBESITY TYPE: ICD-10-CM

## 2023-12-13 RX ORDER — SEMAGLUTIDE 2.4 MG/.75ML
2.4 INJECTION, SOLUTION SUBCUTANEOUS WEEKLY
Qty: 9 ML | Refills: 0 | Status: SHIPPED | OUTPATIENT
Start: 2023-12-13 | End: 2024-03-12

## 2024-01-12 ENCOUNTER — TELEPHONE (OUTPATIENT)
Age: 49
End: 2024-01-12

## 2024-01-12 ENCOUNTER — OFFICE VISIT (OUTPATIENT)
Dept: FAMILY MEDICINE CLINIC | Facility: CLINIC | Age: 49
End: 2024-01-12
Payer: COMMERCIAL

## 2024-01-12 VITALS
WEIGHT: 229 LBS | OXYGEN SATURATION: 98 % | HEIGHT: 73 IN | SYSTOLIC BLOOD PRESSURE: 138 MMHG | DIASTOLIC BLOOD PRESSURE: 80 MMHG | BODY MASS INDEX: 30.35 KG/M2 | HEART RATE: 78 BPM | RESPIRATION RATE: 12 BRPM

## 2024-01-12 DIAGNOSIS — G47.33 OSA (OBSTRUCTIVE SLEEP APNEA): ICD-10-CM

## 2024-01-12 DIAGNOSIS — E66.9 CLASS 2 OBESITY WITHOUT SERIOUS COMORBIDITY WITH BODY MASS INDEX (BMI) OF 36.0 TO 36.9 IN ADULT, UNSPECIFIED OBESITY TYPE: ICD-10-CM

## 2024-01-12 DIAGNOSIS — F51.01 PRIMARY INSOMNIA: ICD-10-CM

## 2024-01-12 DIAGNOSIS — E06.3 HASHIMOTO'S THYROIDITIS: Primary | ICD-10-CM

## 2024-01-12 PROCEDURE — 99214 OFFICE O/P EST MOD 30 MIN: CPT | Performed by: FAMILY MEDICINE

## 2024-01-12 RX ORDER — LEVOTHYROXINE SODIUM 0.12 MG/1
125 TABLET ORAL DAILY
Qty: 90 TABLET | Refills: 0 | Status: SHIPPED | OUTPATIENT
Start: 2024-01-12

## 2024-01-12 RX ORDER — SEMAGLUTIDE 2.4 MG/.75ML
INJECTION, SOLUTION SUBCUTANEOUS
Qty: 9 ML | Refills: 2 | Status: SHIPPED | OUTPATIENT
Start: 2024-01-12 | End: 2024-01-15 | Stop reason: SDUPTHER

## 2024-01-12 NOTE — PROGRESS NOTES
Name: Nick Julian      : 1975      MRN: [id number]  Encounter Provider: Aysha Pierre MD  Encounter Date: 2024   [facility]    Assessment & Plan     1. Hashimoto's thyroiditis/hypothyroidism  -     TSH, 3rd generation with Free T4 reflex; Future  -     levothyroxine 125 mcg tablet; Take 1 tablet (125 mcg total) by mouth daily    2. Class 2 obesity without serious comorbidity with body mass index (BMI) of 36.0 to 36.9 in adult, unspecified obesity type  Assessment & Plan:  Initial weight: 300 lbs  Current weight: 229 lbs  Continue wegovy as directed  Diet, exercise    Orders:  -     CBC and differential  -     Comprehensive metabolic panel  -     Hemoglobin A1C  -     Lipid panel  -     Semaglutide-Weight Management (Wegovy) 2.4 MG/0.75ML; 2.4 mg weekly    3. Primary insomnia  Assessment & Plan:  Lunesta 2 mg as needed       4. VIJI (obstructive sleep apnea)  Assessment & Plan:  On cpap             Subjective     HPI  Here for follow up  Feels well overall  No side effects from the medications      Review of Systems   Constitutional: Negative.    HENT: Negative.     Eyes: Negative.    Respiratory: Negative.     Cardiovascular: Negative.    Gastrointestinal: Negative.    Musculoskeletal: Negative.    Allergic/Immunologic: Negative.    Neurological: Negative.    Hematological: Negative.    Psychiatric/Behavioral: Negative.         Past Medical History:   Diagnosis Date   • Biallelic mutation of RYR1 gene    • Bruxism 2018   • COVID-19 2022   • Disease of thyroid gland    • Hashimoto's thyroiditis    • Hashimoto's thyroiditis    • Incisional hernia, without obstruction or gangrene 2019    Added automatically from request for surgery [id number]   • Pneumonia due to COVID-19 virus 11/15/2020   • Positive Lyme disease serology    • Right knee pain     last assessed - 2013   • Sleep apnea    • Spermatocele    • Status post laparoscopic hernia  repair 03/13/2019   • Umbilical hernia without obstruction or gangrene     last assessed - 05Sep2017   • Umbilical pain     resolved - 13Dec2017   • Umbilical swelling     resolved - 13Dec2017   • Varicose veins of both lower extremities    • Ventral hernia without obstruction or gangrene     last assessed - 05Sep2017     Past Surgical History:   Procedure Laterality Date   • W[place] ENDOVEN ABLTJ INCMPTNT VEIN XTR LASER 1ST VEIN Right 9/26/2017    Procedure: GSV ENDOVASCULAR LASER THERAPY W/ MULTIPLE STAB PHLEBECTOMIES;  Surgeon: Mayuri Wilson MD;  [facility];  Service: Vascular   • WV LAP RPR HRNA XCPT INCAL/INGUN NCRC8/STRANGULATED N/A 2/26/2019    Procedure: REPAIR HERNIA INCISIONAL LAPAROSCOPIC;  Surgeon: Wolfgang Duran MD;  [facility];  Service: General   • SKIN SURGERY      MOLES REMOVED AS CHILD   • TONSILLECTOMY     • UMBILICAL HERNIA REPAIR      last assessed - 13Dec2017   • VENTRAL HERNIA REPAIR       Family History   Problem Relation Age of Onset   • Melanoma Mother    • Hashimoto's thyroiditis Brother    • Heart disease Family      Social History     Socioeconomic History   • Marital status: /Civil Union     Spouse name: Not on file   • Number of children: Not on file   • Years of education: Not on file   • Highest education level: Not on file   Occupational History   • Not on file   Tobacco Use   • Smoking status: Former     Types: Cigarettes     Passive exposure: Never   • Smokeless tobacco: Former     Types: Chew   • Tobacco comments:     stopped 8 years   Vaping Use   • Vaping status: Never Used   Substance and Sexual Activity   • Alcohol use: Yes     Alcohol/week: 3.0 standard drinks of alcohol     Types: 3 Shots of liquor per week   • Drug use: No   • Sexual activity: Yes     Partners: Female   Other Topics Concern   • Not on file   Social History Narrative    Exercise habits         Social Determinants of Health     Financial Resource Strain: Not on file   Food Insecurity:  "Not on file   Transportation Needs: Not on file   Physical Activity: Not on file   Stress: Not on file   Social Connections: Not on file   Intimate Partner Violence: Not on file   Housing Stability: Not on file     Current Outpatient Medications on File Prior to Visit   Medication Sig   • eszopiclone (LUNESTA) 2 mg tablet Take 1 tablet (2 mg total) by mouth daily at bedtime as needed for sleep Take immediately before bedtime   • [DISCONTINUED] levothyroxine 125 mcg tablet Take 1 tablet (125 mcg total) by mouth daily   • [DISCONTINUED] Semaglutide-Weight Management (Wegovy) 2.4 MG/0.75ML Inject 0.75 mL (2.4 mg total) under the skin once a week     Allergies   Allergen Reactions   • Pollen Extract    • Succinylcholine Other (See Comments)     Reaction: \"malignant hyperthermia\"     Immunization History   Administered Date(s) Administered   • COVID-19 PFIZER VACCINE 0.3 ML IM 02/13/2021, 03/06/2021, 10/14/2021   • Hep B, Adolescent or Pediatric 07/16/2013   • Hep B, adult 05/29/2013, 07/16/2013, 12/11/2013   • INFLUENZA 11/04/2010, 09/26/2013, 10/20/2014, 10/01/2016, 10/05/2018, 10/15/2019   • Influenza Quadrivalent Preservative Free 3 years and older IM 10/20/2014, 10/01/2016   • Influenza, injectable, quadrivalent, preservative free 0.5 mL 09/17/2020, 09/24/2021, 09/27/2022, 09/29/2023   • Influenza, seasonal, injectable 11/04/2010, 10/01/2015   • MMR 11/28/1977, 04/01/1987   • Tdap 11/25/2008, 05/29/2013   • Tuberculin Skin Test-PPD Intradermal 05/29/2013, 06/12/2013, 03/25/2015       Objective     /80   Pulse 78   Resp 12   Ht 6' 1\" (1.854 m)   Wt 104 kg (229 lb)   SpO2 98%   BMI 30.21 kg/m²     Physical Exam  Vitals and nursing note reviewed.   Constitutional:       Appearance: Normal appearance.   HENT:      Head: Normocephalic and atraumatic.   Cardiovascular:      Rate and Rhythm: Normal rate and regular rhythm.      Pulses: Normal pulses.      Heart sounds: Normal heart sounds.   Pulmonary:      " Effort: Pulmonary effort is normal.      Breath sounds: Normal breath sounds.   Neurological:      General: No focal deficit present.      Mental Status: He is alert.   Psychiatric:         Mood and Affect: Mood normal.         Behavior: Behavior normal.       Aysha Pierre MD

## 2024-01-12 NOTE — TELEPHONE ENCOUNTER
Patients GI provider:  Dr. Morse    Number to return call: [phone]    Reason for call: Pt calling in regards to bill received from colonoscopy done in Nov 2023. Advised pt to reach out to billing, Pt stated that billing directed him to call office as this was a coding issue. Messaged  in regards to this. Please call pt back to help him w/this.    Scheduled procedure/appointment date if applicable: N/A

## 2024-01-12 NOTE — TELEPHONE ENCOUNTER
01/12/24  Screened by: Amira Steel    Referring Provider - Immanuel    30.21     Has patient been referred for a routine screening Colonoscopy? yes  Is the patient between 45-75 years old? yes      Previous Colonoscopy yes   If yes:    Date: 11/2023    Facility: [facility]    Reason: Screening      SCHEDULING STAFF: If the patient is between 45yrs-49yrs, please advise patient to confirm benefits/coverage with their insurance company for a routine screening colonoscopy, some insurance carriers will only cover at 50yrs or older. If the patient is over 75years old, please schedule an office visit.     Does the patient want to see a Gastroenterologist prior to their procedure OR are they having any GI symptoms? no    Has the patient been hospitalized or had abdominal surgery in the past 6 months? no    Does the patient use supplemental oxygen? no    Does the patient take Coumadin, Lovenox, Plavix, Elliquis, Xarelto, or other blood thinning medication? no    Has the patient had a stroke, cardiac event, or stent placed in the past year? no    SCHEDULING STAFF: If patient answers NO to above questions, then schedule procedure. If patient answers YES to above questions, then schedule office appointment.     If patient is between 45yrs - 49yrs, please advise patient that we will have to confirm benefits & coverage with their insurance company for a routine screening colonoscopy.

## 2024-01-15 DIAGNOSIS — E66.9 CLASS 2 OBESITY WITHOUT SERIOUS COMORBIDITY WITH BODY MASS INDEX (BMI) OF 36.0 TO 36.9 IN ADULT, UNSPECIFIED OBESITY TYPE: ICD-10-CM

## 2024-01-15 RX ORDER — SEMAGLUTIDE 2.4 MG/.75ML
INJECTION, SOLUTION SUBCUTANEOUS
Qty: 9 ML | Refills: 2 | Status: SHIPPED | OUTPATIENT
Start: 2024-01-15 | End: 2026-01-15

## 2024-01-15 NOTE — TELEPHONE ENCOUNTER
It's cheaper the patient to get at [facility] then it is for him to get at [facility] so he would like it resent to [facility].    Reason for call:   [x] Refill   [] Prior Auth  [] Other:     Office:   [x] PCP/Provider - Aysha Pierre   [] Specialty/Provider -     Medication: Wegovy     Dose/Frequency: 2.4MG/0.75mL     Quantity: 9 mL    Pharmacy: [facility] [id number]     Does the patient have enough for 3 days?   [] Yes   [x] No - Send as HP to POD

## 2024-01-15 NOTE — TELEPHONE ENCOUNTER
Last script 01.12.2023 sent to [facility].  Patient requesting script sent to [facility]  Resent today 01.15.2024 to [facility]

## 2024-03-06 ENCOUNTER — TELEPHONE (OUTPATIENT)
Age: 49
End: 2024-03-06

## 2024-03-06 DIAGNOSIS — Z12.11 SCREEN FOR COLON CANCER: Primary | ICD-10-CM

## 2024-03-06 NOTE — TELEPHONE ENCOUNTER
Patients GI provider:  Dr. Gambino    Number to return call: [phone]    Reason for call: Pt Scheduled colon please place order for golytely. Pt also wants to know when to stop his wegovy    Scheduled procedure/appointment date if applicable: procedure 4/17/2024

## 2024-03-06 NOTE — TELEPHONE ENCOUNTER
Scheduled date of colonoscopy (as of today):4/17/2024  Physician performing colonoscopy:  Location of colonoscopy: [place]

## 2024-03-06 NOTE — TELEPHONE ENCOUNTER
Called and spoke to patient informed him of the Golytely sent to his pharmacy. Also informed him that Wevy is a 7 day hold. Bowel prep instructions sent through patient's [employer]. Instructed patient to call with any further questions or concerns.

## 2024-04-04 ENCOUNTER — ANESTHESIA EVENT (OUTPATIENT)
Dept: ANESTHESIOLOGY | Facility: HOSPITAL | Age: 49
End: 2024-04-04

## 2024-04-04 ENCOUNTER — ANESTHESIA (OUTPATIENT)
Dept: ANESTHESIOLOGY | Facility: HOSPITAL | Age: 49
End: 2024-04-04

## 2024-04-09 ENCOUNTER — TELEPHONE (OUTPATIENT)
Dept: GASTROENTEROLOGY | Facility: AMBULARY SURGERY CENTER | Age: 49
End: 2024-04-09

## 2024-04-16 RX ORDER — SODIUM CHLORIDE, SODIUM LACTATE, POTASSIUM CHLORIDE, CALCIUM CHLORIDE 600; 310; 30; 20 MG/100ML; MG/100ML; MG/100ML; MG/100ML
75 INJECTION, SOLUTION INTRAVENOUS CONTINUOUS
Status: CANCELLED | OUTPATIENT
Start: 2024-04-16

## 2024-04-17 ENCOUNTER — ANESTHESIA EVENT (OUTPATIENT)
Dept: GASTROENTEROLOGY | Facility: AMBULARY SURGERY CENTER | Age: 49
End: 2024-04-17

## 2024-04-17 ENCOUNTER — HOSPITAL ENCOUNTER (OUTPATIENT)
Dept: GASTROENTEROLOGY | Facility: AMBULARY SURGERY CENTER | Age: 49
Setting detail: OUTPATIENT SURGERY
Discharge: HOME | End: 2024-04-17
Attending: INTERNAL MEDICINE
Payer: COMMERCIAL

## 2024-04-17 ENCOUNTER — ANESTHESIA (OUTPATIENT)
Dept: GASTROENTEROLOGY | Facility: AMBULARY SURGERY CENTER | Age: 49
End: 2024-04-17

## 2024-04-17 VITALS
HEART RATE: 69 BPM | OXYGEN SATURATION: 99 % | TEMPERATURE: 97.8 F | WEIGHT: 220 LBS | BODY MASS INDEX: 29.16 KG/M2 | RESPIRATION RATE: 18 BRPM | HEIGHT: 73 IN | SYSTOLIC BLOOD PRESSURE: 119 MMHG | DIASTOLIC BLOOD PRESSURE: 64 MMHG

## 2024-04-17 DIAGNOSIS — Z12.11 SCREENING FOR COLON CANCER: ICD-10-CM

## 2024-04-17 DIAGNOSIS — Z86.010 HISTORY OF COLON POLYPS: ICD-10-CM

## 2024-04-17 PROBLEM — E03.9 HYPOTHYROIDISM: Status: ACTIVE | Noted: 2024-04-17

## 2024-04-17 PROBLEM — Z86.718 HISTORY OF DVT (DEEP VEIN THROMBOSIS): Status: ACTIVE | Noted: 2024-04-17

## 2024-04-17 PROCEDURE — 88305 TISSUE EXAM BY PATHOLOGIST: CPT | Performed by: STUDENT IN AN ORGANIZED HEALTH CARE EDUCATION/TRAINING PROGRAM

## 2024-04-17 PROCEDURE — 45380 COLONOSCOPY AND BIOPSY: CPT | Performed by: INTERNAL MEDICINE

## 2024-04-17 RX ORDER — LIDOCAINE HYDROCHLORIDE 10 MG/ML
INJECTION, SOLUTION EPIDURAL; INFILTRATION; INTRACAUDAL; PERINEURAL AS NEEDED
Status: DISCONTINUED | OUTPATIENT
Start: 2024-04-17 | End: 2024-04-17

## 2024-04-17 RX ORDER — SODIUM CHLORIDE, SODIUM LACTATE, POTASSIUM CHLORIDE, CALCIUM CHLORIDE 600; 310; 30; 20 MG/100ML; MG/100ML; MG/100ML; MG/100ML
75 INJECTION, SOLUTION INTRAVENOUS CONTINUOUS
Status: DISCONTINUED | OUTPATIENT
Start: 2024-04-17 | End: 2024-04-21 | Stop reason: HOSPADM

## 2024-04-17 RX ORDER — PROPOFOL 10 MG/ML
INJECTION, EMULSION INTRAVENOUS AS NEEDED
Status: DISCONTINUED | OUTPATIENT
Start: 2024-04-17 | End: 2024-04-17

## 2024-04-17 RX ORDER — SODIUM CHLORIDE 9 MG/ML
INJECTION, SOLUTION INTRAVENOUS CONTINUOUS PRN
Status: DISCONTINUED | OUTPATIENT
Start: 2024-04-17 | End: 2024-04-17

## 2024-04-17 RX ADMIN — PROPOFOL 50 MG: 10 INJECTION, EMULSION INTRAVENOUS at 10:04

## 2024-04-17 RX ADMIN — LIDOCAINE HYDROCHLORIDE 50 MG: 10 INJECTION, SOLUTION EPIDURAL; INFILTRATION; INTRACAUDAL at 10:01

## 2024-04-17 RX ADMIN — PROPOFOL 100 MG: 10 INJECTION, EMULSION INTRAVENOUS at 10:01

## 2024-04-17 RX ADMIN — SODIUM CHLORIDE, SODIUM LACTATE, POTASSIUM CHLORIDE, AND CALCIUM CHLORIDE 75 ML/HR: .6; .31; .03; .02 INJECTION, SOLUTION INTRAVENOUS at 09:27

## 2024-04-17 NOTE — ANESTHESIA POSTPROCEDURE EVALUATION
Post-Op Assessment Note    CV Status:  Stable  Pain Score: 0    Pain management: adequate       Mental Status:  Awake   Hydration Status:  Stable   PONV Controlled:  None   Airway Patency:  Patent     Post Op Vitals Reviewed: Yes    No anethesia notable event occurred.    Staff: Anesthesiologist               /64 (04/17/24 1033)    Temp      Pulse 69 (04/17/24 1033)   Resp 18 (04/17/24 1033)    SpO2 99 % (04/17/24 1033)

## 2024-04-17 NOTE — H&P
History and Physical - [facility]  Nick Julian 48 y.o. male MRN: [id number]        HPI: 48-year-old male with history of multiple colon polyps removed during colonoscopy last year but the preparation was suboptimal.    Historical Information   Past Medical History:   Diagnosis Date    Biallelic mutation of RYR1 gene     Bruxism 03/28/2018    COVID-19 08/26/2022    Disease of thyroid gland     Hashimoto's thyroiditis     Hashimoto's thyroiditis     Incisional hernia, without obstruction or gangrene 01/22/2019    Added automatically from request for surgery [id number]    Pneumonia due to COVID-19 virus 11/15/2020    Positive Lyme disease serology     Right knee pain     last assessed - 24Oct2013    Sleep apnea     Spermatocele     Status post laparoscopic hernia repair 03/13/2019    Umbilical hernia without obstruction or gangrene     last assessed - 05Sep2017    Umbilical pain     resolved - 13Dec2017    Umbilical swelling     resolved - 13Dec2017    Varicose veins of both lower extremities     Ventral hernia without obstruction or gangrene     last assessed - 05Sep2017     Past Surgical History:   Procedure Laterality Date    IL ENDOVEN ABLTJ INCMPTNT VEIN XTR LASER 1ST VEIN Right 9/26/2017    Procedure: GSV ENDOVASCULAR LASER THERAPY W/ MULTIPLE STAB PHLEBECTOMIES;  Surgeon: Mayuri Wilson MD;  [facility];  Service: Vascular    IL LAP RPR HRNA XCPT INCAL/INGUN NCRC8/STRANGULATED N/A 2/26/2019    Procedure: REPAIR HERNIA INCISIONAL LAPAROSCOPIC;  Surgeon: Wolfgang Duran MD;  [facility];  Service: General    SKIN SURGERY      MOLES REMOVED AS CHILD    TONSILLECTOMY      UMBILICAL HERNIA REPAIR      last assessed - 13Dec2017    VENTRAL HERNIA REPAIR       Social History   Social History     Substance and Sexual Activity   Alcohol Use Yes    Alcohol/week: 3.0 standard drinks of alcohol    Types: 3 Shots of liquor per week     Social History     Substance and Sexual Activity  "  Drug Use No     Social History     Tobacco Use   Smoking Status Former    Types: Cigarettes    Passive exposure: Never   Smokeless Tobacco Former    Types: Chew   Tobacco Comments    stopped 8 years     Family History   Problem Relation Age of Onset    Melanoma Mother     Hashimoto's thyroiditis Brother     Heart disease Family        Meds/Allergies     (Not in a hospital admission)      Allergies   Allergen Reactions    Pollen Extract     Succinylcholine Other (See Comments)     Reaction: \"malignant hyperthermia\"       Objective     Blood pressure 120/79, pulse 74, resp. rate 18, height 6' 1\" (1.854 m), weight 99.8 kg (220 lb), SpO2 99%.    Physical Exam:    Chest- CTA  Heart- RRR  Abdomen- NT/ND  Extremities- No edema    ASSESSMENT:     History of colon polyps    PLAN:    Colonoscopy              "

## 2024-04-17 NOTE — ANESTHESIA POSTPROCEDURE EVALUATION
Post-Op Assessment Note    CV Status:  Stable  Pain Score: 0    Pain management: adequate       Mental Status:  Sleepy and arousable   Hydration Status:  Stable and euvolemic   PONV Controlled:  None   Airway Patency:  Patent     Post Op Vitals Reviewed: Yes      Staff: CRNA               BP   91/53   Temp      Pulse 71   Resp 18   SpO2 99

## 2024-04-22 DIAGNOSIS — E06.3 HASHIMOTO'S THYROIDITIS: ICD-10-CM

## 2024-04-22 PROCEDURE — 88305 TISSUE EXAM BY PATHOLOGIST: CPT | Performed by: STUDENT IN AN ORGANIZED HEALTH CARE EDUCATION/TRAINING PROGRAM

## 2024-04-22 RX ORDER — LEVOTHYROXINE SODIUM 0.12 MG/1
125 TABLET ORAL DAILY
Qty: 90 TABLET | Refills: 1 | Status: SHIPPED | OUTPATIENT
Start: 2024-04-22

## 2024-04-25 ENCOUNTER — APPOINTMENT (OUTPATIENT)
Dept: RADIOLOGY | Age: 49
End: 2024-04-25
Payer: COMMERCIAL

## 2024-04-25 ENCOUNTER — OFFICE VISIT (OUTPATIENT)
Dept: URGENT CARE | Age: 49
End: 2024-04-25
Payer: COMMERCIAL

## 2024-04-25 VITALS — SYSTOLIC BLOOD PRESSURE: 108 MMHG | TEMPERATURE: 97.5 F | RESPIRATION RATE: 18 BRPM | DIASTOLIC BLOOD PRESSURE: 92 MMHG

## 2024-04-25 DIAGNOSIS — S39.92XA TAILBONE INJURY, INITIAL ENCOUNTER: Primary | ICD-10-CM

## 2024-04-25 DIAGNOSIS — S39.92XA TAILBONE INJURY, INITIAL ENCOUNTER: ICD-10-CM

## 2024-04-25 PROCEDURE — 72220 X-RAY EXAM SACRUM TAILBONE: CPT

## 2024-04-25 PROCEDURE — 99213 OFFICE O/P EST LOW 20 MIN: CPT | Performed by: STUDENT IN AN ORGANIZED HEALTH CARE EDUCATION/TRAINING PROGRAM

## 2024-04-25 NOTE — PROGRESS NOTES
"  [facility]        NAME: Nick Julian is a 48 y.o. male  : 1975    MRN: [id number]  DATE: 2024  TIME: 9:38 AM    Assessment and Plan   Tailbone injury, initial encounter [S39.92XA]  1. Tailbone injury, initial encounter  XR sacrum and coccyx      Concern for coccyx fracture on my read of his x-ray, pending radiology read.  Patient instructions as below.      Patient Instructions   You had an xray in office today - I am concerned for a possible fracture.   The radiologist will also read your x-ray and these results with [facility], please call us with any questions.  I recommend using a donut pillow to help offload weight from the area.  Ice and anti-inflammatory pain medicine such as ibuprofen will also be helpful for the pain.  You can alternate ibuprofen with Tylenol.  If your symptoms are not starting to improve over the coming week, please follow-up with your PCP for recheck.    Follow up with PCP in 3-5 days.  Proceed to  ER if symptoms worsen.    If tests have been performed at [facility], our office will contact you with results if changes need to be made to the care plan discussed with you at the visit.  [facility]    Chief Complaint     Chief Complaint   Patient presents with    Back Pain     C/o \"tailbone\" pain after sitting down forcefully on a piece of exercise equipment.         History of Present Illness       Last night was lifting weights, while carrying weight, he sat down on a rounded sharp edge of bench. His weight was focused on the tailbone and he had immediate pain that almost made him nauseas. Continued with pain through last night to this am.  No interventions at home so far, no meds.    Back Pain        Review of Systems   Review of Systems   Musculoskeletal:  Positive for back pain.   All other systems reviewed and are negative.        Current Medications       Current Outpatient Medications:     eszopiclone " (LUNESTA) 2 mg tablet, Take 1 tablet (2 mg total) by mouth daily at bedtime as needed for sleep Take immediately before bedtime, Disp: 30 tablet, Rfl: 0    levothyroxine 125 mcg tablet, Take 1 tablet (125 mcg total) by mouth daily, Disp: 90 tablet, Rfl: 1    Semaglutide-Weight Management (Wegovy) 2.4 MG/0.75ML, 2.4 mg weekly, Disp: 9 mL, Rfl: 2    Current Allergies     Allergies as of 04/25/2024 - Reviewed 04/25/2024   Allergen Reaction Noted    Pollen extract  05/31/2016    Succinylcholine Other (See Comments) 09/17/2020            The following portions of the patient's history were reviewed and updated as appropriate: allergies, current medications, past family history, past medical history, past social history, past surgical history and problem list.     Past Medical History:   Diagnosis Date    Biallelic mutation of RYR1 gene     Bruxism 03/28/2018    COVID-19 08/26/2022    Disease of thyroid gland     Hashimoto's thyroiditis     Hashimoto's thyroiditis     Incisional hernia, without obstruction or gangrene 01/22/2019    Added automatically from request for surgery [id number]    Pneumonia due to COVID-19 virus 11/15/2020    Positive Lyme disease serology     Right knee pain     last assessed - 24Oct2013    Sleep apnea     Spermatocele     Status post laparoscopic hernia repair 03/13/2019    Umbilical hernia without obstruction or gangrene     last assessed - [id number]    Umbilical pain     resolved - [id number]    Umbilical swelling     resolved - [id number]    Varicose veins of both lower extremities     Ventral hernia without obstruction or gangrene     last assessed - [id number]       Past Surgical History:   Procedure Laterality Date    NE ENDOVEN ABLTJ INCMPTNT VEIN XTR LASER 1ST VEIN Right 9/26/2017    Procedure: GSV ENDOVASCULAR LASER THERAPY W/ MULTIPLE STAB PHLEBECTOMIES;  Surgeon: Mayuri Wilson MD;  [facility];  Service: Vascular    NE LAP RPR HRNA XCPT INCAL/INGUN NCRC8/STRANGULATED N/A 2/26/2019     Procedure: REPAIR HERNIA INCISIONAL LAPAROSCOPIC;  Surgeon: Wolfgang Duran MD;  [facility];  Service: General    SKIN SURGERY      MOLES REMOVED AS CHILD    TONSILLECTOMY      UMBILICAL HERNIA REPAIR      last assessed - [id number]    VENTRAL HERNIA REPAIR         Family History   Problem Relation Age of Onset    Melanoma Mother     Hashimoto's thyroiditis Brother     Heart disease Family          Medications have been verified.        Objective   /92   Temp 97.5 °F (36.4 °C) (Temporal)   Resp 18   No LMP for male patient.       Physical Exam     Physical Exam  Vitals and nursing note reviewed.   Constitutional:       Appearance: He is not ill-appearing, toxic-appearing or diaphoretic.   HENT:      Head: Normocephalic and atraumatic.      Right Ear: External ear normal.      Left Ear: External ear normal.      Nose: Nose normal.   Eyes:      Extraocular Movements: Extraocular movements intact.      Conjunctiva/sclera: Conjunctivae normal.   Musculoskeletal:         General: Tenderness present. No deformity.      Comments: Tender to palpation along coccyx   Skin:     General: Skin is warm and dry.      Findings: Bruising present. No lesion or rash.      Comments: No wound, skin break,   +faint ecchymosis overlying coccyx area   Neurological:      Mental Status: He is alert.   Psychiatric:         Mood and Affect: Mood normal.

## 2024-04-25 NOTE — PATIENT INSTRUCTIONS
You had an xray in office today - I am concerned for a possible fracture.   The radiologist will also read your x-ray and these results with vomiting MyChart, please call us with any questions.  I recommend using a donut pillow to help offload weight from the area.  Ice and anti-inflammatory pain medicine such as ibuprofen will also be helpful for the pain.  You can alternate ibuprofen with Tylenol.  If your symptoms are not starting to improve over the coming week, please follow-up with your PCP for recheck.

## 2024-05-13 ENCOUNTER — APPOINTMENT (OUTPATIENT)
Dept: LAB | Facility: CLINIC | Age: 49
End: 2024-05-13
Payer: COMMERCIAL

## 2024-05-13 DIAGNOSIS — Z00.8 HEALTH EXAMINATION IN POPULATION SURVEY: ICD-10-CM

## 2024-05-13 DIAGNOSIS — E06.3 HASHIMOTO'S THYROIDITIS: ICD-10-CM

## 2024-05-13 LAB
ALBUMIN SERPL BCP-MCNC: 4.4 G/DL (ref 3.5–5)
ALP SERPL-CCNC: 55 U/L (ref 34–104)
ALT SERPL W P-5'-P-CCNC: 32 U/L (ref 7–52)
ANION GAP SERPL CALCULATED.3IONS-SCNC: 4 MMOL/L (ref 4–13)
AST SERPL W P-5'-P-CCNC: 22 U/L (ref 13–39)
BASOPHILS # BLD AUTO: 0.02 THOUSANDS/ÂΜL (ref 0–0.1)
BASOPHILS NFR BLD AUTO: 0 % (ref 0–1)
BILIRUB SERPL-MCNC: 0.58 MG/DL (ref 0.2–1)
BUN SERPL-MCNC: 11 MG/DL (ref 5–25)
CALCIUM SERPL-MCNC: 9.3 MG/DL (ref 8.4–10.2)
CHLORIDE SERPL-SCNC: 106 MMOL/L (ref 96–108)
CHOLEST SERPL-MCNC: 148 MG/DL
CO2 SERPL-SCNC: 29 MMOL/L (ref 21–32)
CREAT SERPL-MCNC: 0.99 MG/DL (ref 0.6–1.3)
EOSINOPHIL # BLD AUTO: 0.06 THOUSAND/ÂΜL (ref 0–0.61)
EOSINOPHIL NFR BLD AUTO: 1 % (ref 0–6)
ERYTHROCYTE [DISTWIDTH] IN BLOOD BY AUTOMATED COUNT: 14.7 % (ref 11.6–15.1)
EST. AVERAGE GLUCOSE BLD GHB EST-MCNC: 103 MG/DL
GFR SERPL CREATININE-BSD FRML MDRD: 90 ML/MIN/1.73SQ M
GLUCOSE P FAST SERPL-MCNC: 96 MG/DL (ref 65–99)
HBA1C MFR BLD: 5.2 %
HCT VFR BLD AUTO: 44.7 % (ref 36.5–49.3)
HDLC SERPL-MCNC: 53 MG/DL
HGB BLD-MCNC: 14.9 G/DL (ref 12–17)
IMM GRANULOCYTES # BLD AUTO: 0.02 THOUSAND/UL (ref 0–0.2)
IMM GRANULOCYTES NFR BLD AUTO: 0 % (ref 0–2)
LDLC SERPL CALC-MCNC: 83 MG/DL (ref 0–100)
LYMPHOCYTES # BLD AUTO: 1.36 THOUSANDS/ÂΜL (ref 0.6–4.47)
LYMPHOCYTES NFR BLD AUTO: 23 % (ref 14–44)
MCH RBC QN AUTO: 30.7 PG (ref 26.8–34.3)
MCHC RBC AUTO-ENTMCNC: 33.3 G/DL (ref 31.4–37.4)
MCV RBC AUTO: 92 FL (ref 82–98)
MONOCYTES # BLD AUTO: 0.52 THOUSAND/ÂΜL (ref 0.17–1.22)
MONOCYTES NFR BLD AUTO: 9 % (ref 4–12)
NEUTROPHILS # BLD AUTO: 3.96 THOUSANDS/ÂΜL (ref 1.85–7.62)
NEUTS SEG NFR BLD AUTO: 67 % (ref 43–75)
NONHDLC SERPL-MCNC: 95 MG/DL
NRBC BLD AUTO-RTO: 0 /100 WBCS
PLATELET # BLD AUTO: 185 THOUSANDS/UL (ref 149–390)
PMV BLD AUTO: 11.4 FL (ref 8.9–12.7)
POTASSIUM SERPL-SCNC: 5.1 MMOL/L (ref 3.5–5.3)
PROT SERPL-MCNC: 6.7 G/DL (ref 6.4–8.4)
RBC # BLD AUTO: 4.85 MILLION/UL (ref 3.88–5.62)
SODIUM SERPL-SCNC: 139 MMOL/L (ref 135–147)
TRIGL SERPL-MCNC: 62 MG/DL
TSH SERPL DL<=0.05 MIU/L-ACNC: 2.94 UIU/ML (ref 0.45–4.5)
WBC # BLD AUTO: 5.94 THOUSAND/UL (ref 4.31–10.16)

## 2024-05-13 PROCEDURE — 84443 ASSAY THYROID STIM HORMONE: CPT

## 2024-05-13 PROCEDURE — 36415 COLL VENOUS BLD VENIPUNCTURE: CPT

## 2024-05-14 ENCOUNTER — OFFICE VISIT (OUTPATIENT)
Dept: FAMILY MEDICINE CLINIC | Facility: CLINIC | Age: 49
End: 2024-05-14
Payer: COMMERCIAL

## 2024-05-14 VITALS
OXYGEN SATURATION: 100 % | SYSTOLIC BLOOD PRESSURE: 110 MMHG | DIASTOLIC BLOOD PRESSURE: 60 MMHG | HEART RATE: 52 BPM | TEMPERATURE: 97.8 F | WEIGHT: 224 LBS | RESPIRATION RATE: 16 BRPM | BODY MASS INDEX: 29.69 KG/M2 | HEIGHT: 73 IN

## 2024-05-14 DIAGNOSIS — E66.3 OVERWEIGHT (BMI 25.0-29.9): Primary | ICD-10-CM

## 2024-05-14 DIAGNOSIS — F51.01 PRIMARY INSOMNIA: ICD-10-CM

## 2024-05-14 DIAGNOSIS — E06.3 HASHIMOTO'S THYROIDITIS: ICD-10-CM

## 2024-05-14 DIAGNOSIS — Z23 ENCOUNTER FOR IMMUNIZATION: ICD-10-CM

## 2024-05-14 DIAGNOSIS — E03.8 OTHER SPECIFIED HYPOTHYROIDISM: ICD-10-CM

## 2024-05-14 PROCEDURE — 90715 TDAP VACCINE 7 YRS/> IM: CPT

## 2024-05-14 PROCEDURE — 99214 OFFICE O/P EST MOD 30 MIN: CPT | Performed by: FAMILY MEDICINE

## 2024-05-14 PROCEDURE — 90471 IMMUNIZATION ADMIN: CPT

## 2024-05-14 NOTE — PROGRESS NOTES
Name: Nick Julian      : 1975      MRN: [id number]  Encounter Provider: Aysha Pierre MD  Encounter Date: 2024   Encounter department: [facility]    Assessment & Plan     1. Overweight (BMI 25.0-29.9)  Assessment & Plan:  Initial weight: 300 lbs  Current weight: 224 lbs  Doing well on wegovy  Continue diet, exercise and portion control        2. Encounter for immunization  -     TDAP VACCINE GREATER THAN OR EQUAL TO 6YO IM    3. Primary insomnia  Assessment & Plan:  Stable on lunesta PRN      4. Other specified hypothyroidism  -     TSH, 3rd generation with Free T4 reflex    5. Hashimoto's thyroiditis/hypothyroidism  Assessment & Plan:  Stable on current meds  Recheck labs in 4 months              Subjective     HPI  Here for follow up  Feels well overall  No side effects from the medications   Eating well overall      Review of Systems   Constitutional: Negative.    HENT: Negative.     Eyes: Negative.    Respiratory: Negative.     Cardiovascular: Negative.    Gastrointestinal: Negative.    Endocrine: Negative.    Genitourinary: Negative.    Musculoskeletal: Negative.    Skin: Negative.    Allergic/Immunologic: Negative.    Neurological: Negative.    Hematological: Negative.    Psychiatric/Behavioral: Negative.         Past Medical History:   Diagnosis Date   • Allergic     Succinylcholine- Malignant Hyperthermia   • Biallelic mutation of RYR1 gene    • Bruxism 2018   • COVID-19 2022   • Disease of thyroid gland    • Hashimoto's thyroiditis    • Hashimoto's thyroiditis    • Incisional hernia, without obstruction or gangrene 2019    Added automatically from request for surgery [id number]   • Pneumonia due to COVID-19 virus 11/15/2020   • Positive Lyme disease serology    • Right knee pain     last assessed - 2013   • Sleep apnea    • Spermatocele    • Status post laparoscopic hernia repair 2019   • Umbilical hernia without obstruction or gangrene     last  assessed - 2017   • Umbilical pain     resolved - 2017   • Umbilical swelling     resolved - 2017   • Varicose veins of both lower extremities    • Ventral hernia without obstruction or gangrene     last assessed - 2017     Past Surgical History:   Procedure Laterality Date   • LA ENDOVEN ABLTJ INCMPTNT VEIN XTR LASER 1ST VEIN Right 2017    Procedure: GSV ENDOVASCULAR LASER THERAPY W/ MULTIPLE STAB PHLEBECTOMIES;  Surgeon: Mayuri Wilson MD;  [facility];  Service: Vascular   • LA LAP RPR HRNA XCPT INCAL/INGUN NCRC8/STRANGULATED N/A 2019    Procedure: REPAIR HERNIA INCISIONAL LAPAROSCOPIC;  Surgeon: Wolfgang Duran MD;  [facility];  Service: General   • SKIN SURGERY      MOLES REMOVED AS CHILD   • TONSILLECTOMY     • UMBILICAL HERNIA REPAIR      last assessed - 2017   • VENTRAL HERNIA REPAIR       Family History   Problem Relation Age of Onset   • Melanoma Mother    • Hashimoto's thyroiditis Brother    • Heart disease Family    • Thyroid disease Father    • Diabetes Paternal Grandfather         Type 1 Diabetic   • Diabetes Maternal Uncle    • Thyroid disease Brother    • Thyroid disease Brother      Social History     Socioeconomic History   • Marital status: /Civil Union     Spouse name: None   • Number of children: None   • Years of education: None   • Highest education level: None   Occupational History   • None   Tobacco Use   • Smoking status: Former     Current packs/day: 0.00     Average packs/day: 1 pack/day for 5.0 years (5.0 ttl pk-yrs)     Types: Cigarettes     Quit date: 2005     Years since quittin.7     Passive exposure: Never   • Smokeless tobacco: Former     Types: Chew   • Tobacco comments:     stopped 8 years   Vaping Use   • Vaping status: Never Used   Substance and Sexual Activity   • Alcohol use: Not Currently     Alcohol/week: 5.0 standard drinks of alcohol     Types: 5 Glasses of wine per week   • Drug use: No   • Sexual  "activity: Yes     Partners: Female     Birth control/protection: Male Sterilization   Other Topics Concern   • None   Social History Narrative    Exercise habits         Social Determinants of Health     Financial Resource Strain: Not on file   Food Insecurity: Not on file   Transportation Needs: Not on file   Physical Activity: Not on file   Stress: Not on file   Social Connections: Not on file   Intimate Partner Violence: Not on file   Housing Stability: Not on file     Current Outpatient Medications on File Prior to Visit   Medication Sig   • eszopiclone (LUNESTA) 2 mg tablet Take 1 tablet (2 mg total) by mouth daily at bedtime as needed for sleep Take immediately before bedtime   • levothyroxine 125 mcg tablet Take 1 tablet (125 mcg total) by mouth daily   • Semaglutide-Weight Management (Wegovy) 2.4 MG/0.75ML 2.4 mg weekly     Allergies   Allergen Reactions   • Pollen Extract    • Succinylcholine Other (See Comments)     Reaction: \"malignant hyperthermia\"     Immunization History   Administered Date(s) Administered   • COVID-19 PFIZER VACCINE 0.3 ML IM 02/13/2021, 03/06/2021, 10/14/2021   • Hep B, Adolescent or Pediatric 07/16/2013   • Hep B, adult 05/29/2013, 07/16/2013, 12/11/2013   • INFLUENZA 11/04/2010, 09/26/2013, 10/20/2014, 10/01/2016, 10/05/2018, 10/15/2019   • Influenza Quadrivalent Preservative Free 3 years and older IM 10/20/2014, 10/01/2016   • Influenza, injectable, quadrivalent, preservative free 0.5 mL 09/17/2020, 09/24/2021, 09/27/2022, 09/29/2023   • Influenza, seasonal, injectable 11/04/2010, 10/01/2015   • MMR 11/28/1977, 04/01/1987   • Tdap 11/25/2008, 05/29/2013, 05/14/2024   • Tuberculin Skin Test-PPD Intradermal 05/29/2013, 06/12/2013, 03/25/2015       Objective     /60 (BP Location: Left arm, Patient Position: Sitting, Cuff Size: Standard)   Pulse (!) 52   Temp 97.8 °F (36.6 °C) (Tympanic)   Resp 16   Ht 6' 1\" (1.854 m)   Wt 102 kg (224 lb)   SpO2 100%   BMI 29.55 kg/m² "     Physical Exam  Vitals and nursing note reviewed.   Constitutional:       Appearance: Normal appearance. He is well-developed.   HENT:      Head: Normocephalic and atraumatic.      Right Ear: Tympanic membrane normal.      Left Ear: Tympanic membrane normal.      Nose: Nose normal.   Eyes:      Pupils: Pupils are equal, round, and reactive to light.   Neck:      Thyroid: No thyromegaly.   Cardiovascular:      Rate and Rhythm: Normal rate and regular rhythm.      Heart sounds: No murmur heard.  Pulmonary:      Effort: Pulmonary effort is normal.      Breath sounds: Normal breath sounds.   Abdominal:      General: Bowel sounds are normal.      Palpations: Abdomen is soft.   Musculoskeletal:         General: No deformity. Normal range of motion.      Cervical back: Normal range of motion and neck supple.   Skin:     General: Skin is warm.      Capillary Refill: Capillary refill takes less than 2 seconds.      Findings: No erythema or rash.   Neurological:      General: No focal deficit present.      Mental Status: He is alert and oriented to person, place, and time.      Deep Tendon Reflexes: Reflexes are normal and symmetric.   Psychiatric:         Mood and Affect: Mood normal.         Behavior: Behavior normal.       Aysha Pierre MD

## 2024-05-14 NOTE — ASSESSMENT & PLAN NOTE
Initial weight: 300 lbs  Current weight: 224 lbs  Doing well on wegovy  Continue diet, exercise and portion control

## 2024-08-20 DIAGNOSIS — E06.3 HASHIMOTO'S THYROIDITIS: ICD-10-CM

## 2024-08-20 RX ORDER — LEVOTHYROXINE SODIUM 125 UG/1
125 TABLET ORAL DAILY
Qty: 90 TABLET | Refills: 1 | Status: SHIPPED | OUTPATIENT
Start: 2024-08-20

## 2024-10-09 ENCOUNTER — OFFICE VISIT (OUTPATIENT)
Dept: FAMILY MEDICINE CLINIC | Facility: CLINIC | Age: 49
End: 2024-10-09
Payer: COMMERCIAL

## 2024-10-09 VITALS
HEIGHT: 73 IN | TEMPERATURE: 97.1 F | OXYGEN SATURATION: 98 % | DIASTOLIC BLOOD PRESSURE: 80 MMHG | BODY MASS INDEX: 31.54 KG/M2 | SYSTOLIC BLOOD PRESSURE: 110 MMHG | WEIGHT: 238 LBS | HEART RATE: 66 BPM | RESPIRATION RATE: 17 BRPM

## 2024-10-09 DIAGNOSIS — E06.3 HASHIMOTO'S THYROIDITIS: ICD-10-CM

## 2024-10-09 DIAGNOSIS — Z23 FLU VACCINE NEED: ICD-10-CM

## 2024-10-09 DIAGNOSIS — E66.3 OVERWEIGHT (BMI 25.0-29.9): ICD-10-CM

## 2024-10-09 DIAGNOSIS — F51.01 PRIMARY INSOMNIA: ICD-10-CM

## 2024-10-09 DIAGNOSIS — Z00.00 ANNUAL PHYSICAL EXAM: Primary | ICD-10-CM

## 2024-10-09 DIAGNOSIS — G47.33 OSA (OBSTRUCTIVE SLEEP APNEA): ICD-10-CM

## 2024-10-09 PROCEDURE — 90656 IIV3 VACC NO PRSV 0.5 ML IM: CPT

## 2024-10-09 PROCEDURE — 90471 IMMUNIZATION ADMIN: CPT

## 2024-10-09 PROCEDURE — 99396 PREV VISIT EST AGE 40-64: CPT | Performed by: FAMILY MEDICINE

## 2024-10-09 NOTE — PROGRESS NOTES
Adult Annual Physical  Name: Nick Julian      : 1975      MRN: [id number]  Encounter Provider: Aysha Pierre MD  Encounter Date: 10/9/2024   Encounter department: [facility]    Assessment & Plan  Annual physical exam    Orders:  •  Comprehensive metabolic panel  •  CBC and differential  •  Lipid panel  •  Hemoglobin A1C    Overweight (BMI 25.0-29.9)  Stable on current meds         Primary insomnia  Doing well on lunesta prn         Hashimoto's thyroiditis/hypothyroidism  Stable on current meds    Orders:  •  TSH, 3rd generation with Free T4 reflex    VIJI (obstructive sleep apnea)  Not using CPAP machine since he lost weight   He feels well overall            Flu vaccine need    Orders:  •  influenza vaccine preservative-free 0.5 mL IM (Fluzone, Afluria, Fluarix, Flulaval)    Immunizations and preventive care screenings were discussed with patient today. Appropriate education was printed on patient's after visit summary.    Discussed risks and benefits of prostate cancer screening. We discussed the controversial history of PSA screening for prostate cancer in the [place] as well as the risk of over detection and over treatment of prostate cancer by way of PSA screening.  The patient understands that PSA blood testing is an imperfect way to screen for prostate cancer and that elevated PSA levels in the blood may also be caused by infection, inflammation, prostatic trauma or manipulation, urological procedures, or by benign prostatic enlargement.    The role of the digital rectal examination in prostate cancer screening was also discussed and I discussed with him that there is large interobserver variability in the findings of digital rectal examination.    Counseling:  Dental Health: discussed importance of regular tooth brushing, flossing, and dental visits.  Injury prevention: discussed safety/seat belts, safety helmets, smoke detectors, carbon monoxide detectors, and smoking  near bedding or upholstery.  Sexual health: discussed sexually transmitted diseases, partner selection, use of condoms, avoidance of unintended pregnancy, and contraceptive alternatives.  Exercise: the importance of regular exercise/physical activity was discussed. Recommend exercise 3-5 times per week for at least 30 minutes.          History of Present Illness     Adult Annual Physical:  Patient presents for annual physical.     Diet and Physical Activity:  - Diet/Nutrition: well balanced diet and consuming 3-5 servings of fruits/vegetables daily.  - Exercise: moderate cardiovascular exercise.    General Health:  - Sleep: 7-8 hours of sleep on average.  - Hearing: normal hearing bilateral ears.  - Vision: goes for regular eye exams and wears glasses.  - Dental: brushes teeth twice daily and regular dental visits.    /GYN Health:    - History of STDs: no     Health:  - History of STDs: no.     Advanced Care Planning:  - Has an advanced directive?: no    - Has a durable medical POA?: no    - ACP document given to patient?: no      Review of Systems   Constitutional: Negative.    HENT: Negative.     Eyes: Negative.    Respiratory: Negative.     Cardiovascular: Negative.    Gastrointestinal: Negative.    Endocrine: Negative.    Genitourinary: Negative.    Musculoskeletal: Negative.    Skin: Negative.    Allergic/Immunologic: Negative.    Neurological: Negative.    Hematological: Negative.    Psychiatric/Behavioral: Negative.       Medical History Reviewed by provider this encounter:  Allergies  Meds  Problems       Past Medical History   Past Medical History:   Diagnosis Date   • Allergic     Succinylcholine- Malignant Hyperthermia   • Biallelic mutation of RYR1 gene    • Bruxism 03/28/2018   • COVID-19 08/26/2022   • Disease of thyroid gland    • Hashimoto's thyroiditis    • Hashimoto's thyroiditis    • Incisional hernia, without obstruction or gangrene 01/22/2019    Added automatically from request for  surgery [id number]   • Pneumonia due to COVID-19 virus 11/15/2020   • Positive Lyme disease serology    • Right knee pain     last assessed - 24Oct2013   • Sleep apnea    • Spermatocele    • Status post laparoscopic hernia repair 03/13/2019   • Umbilical hernia without obstruction or gangrene     last assessed - 05Sep2017   • Umbilical pain     resolved - 13Dec2017   • Umbilical swelling     resolved - 13Dec2017   • Varicose veins of both lower extremities    • Ventral hernia without obstruction or gangrene     last assessed - 05Sep2017     Past Surgical History:   Procedure Laterality Date   • FL ENDOVEN ABLTJ INCMPTNT VEIN XTR LASER 1ST VEIN Right 9/26/2017    Procedure: GSV ENDOVASCULAR LASER THERAPY W/ MULTIPLE STAB PHLEBECTOMIES;  Surgeon: Mayuri Wilson MD;  Location: [facility];  Service: Vascular   • FL LAP RPR HRNA XCPT INCAL/INGUN NCRC8/STRANGULATED N/A 2/26/2019    Procedure: REPAIR HERNIA INCISIONAL LAPAROSCOPIC;  Surgeon: Wolfgang Duran MD;  Location: [facility];  Service: General   • SKIN SURGERY      MOLES REMOVED AS CHILD   • TONSILLECTOMY     • UMBILICAL HERNIA REPAIR      last assessed - 13Dec2017   • VENTRAL HERNIA REPAIR       Family History   Problem Relation Age of Onset   • Melanoma Mother    • Hashimoto's thyroiditis Brother    • Heart disease Family    • Thyroid disease Father    • Diabetes Paternal Grandfather         Type 1 Diabetic   • Diabetes Maternal Uncle    • Thyroid disease Brother    • Thyroid disease Brother      Current Outpatient Medications on File Prior to Visit   Medication Sig Dispense Refill   • eszopiclone (LUNESTA) 2 mg tablet Take 1 tablet (2 mg total) by mouth daily at bedtime as needed for sleep Take immediately before bedtime 30 tablet 0   • levothyroxine 125 mcg tablet Take 1 tablet (125 mcg total) by mouth daily 90 tablet 1   • Semaglutide-Weight Management (Wegovy) 2.4 MG/0.75ML 2.4 mg weekly 9 mL 2     No current facility-administered medications on file prior to  "visit.     Allergies   Allergen Reactions   • Pollen Extract    • Succinylcholine Other (See Comments)     Reaction: \"malignant hyperthermia\"      Current Outpatient Medications on File Prior to Visit   Medication Sig Dispense Refill   • eszopiclone (LUNESTA) 2 mg tablet Take 1 tablet (2 mg total) by mouth daily at bedtime as needed for sleep Take immediately before bedtime 30 tablet 0   • levothyroxine 125 mcg tablet Take 1 tablet (125 mcg total) by mouth daily 90 tablet 1   • Semaglutide-Weight Management (Wegovy) 2.4 MG/0.75ML 2.4 mg weekly 9 mL 2     No current facility-administered medications on file prior to visit.      Social History     Tobacco Use   • Smoking status: Former     Current packs/day: 0.00     Average packs/day: 1 pack/day for 5.0 years (5.0 ttl pk-yrs)     Types: Cigarettes     Quit date: 2005     Years since quittin.2     Passive exposure: Never   • Smokeless tobacco: Former     Types: Chew   • Tobacco comments:     stopped 8 years   Vaping Use   • Vaping status: Never Used   Substance and Sexual Activity   • Alcohol use: Not Currently     Alcohol/week: 5.0 standard drinks of alcohol     Types: 5 Glasses of wine per week   • Drug use: No   • Sexual activity: Yes     Partners: Female     Birth control/protection: Male Sterilization       Objective     /80 (BP Location: Left arm, Patient Position: Sitting, Cuff Size: Standard)   Pulse 66   Temp (!) 97.1 °F (36.2 °C) (Tympanic)   Resp 17   Ht 6' 1.07\" (1.856 m)   Wt 108 kg (238 lb)   SpO2 98%   BMI 31.34 kg/m²     Physical Exam  Vitals and nursing note reviewed.   Constitutional:       Appearance: Normal appearance. He is well-developed.   HENT:      Head: Normocephalic and atraumatic.      Right Ear: Tympanic membrane normal.      Left Ear: Tympanic membrane normal.      Nose: Nose normal.   Eyes:      Pupils: Pupils are equal, round, and reactive to light.   Cardiovascular:      Rate and Rhythm: Normal rate and regular " rhythm.      Pulses: Normal pulses.      Heart sounds: Normal heart sounds.   Pulmonary:      Effort: Pulmonary effort is normal.      Breath sounds: Normal breath sounds.   Abdominal:      Palpations: Abdomen is soft.   Musculoskeletal:         General: Normal range of motion.      Cervical back: Normal range of motion and neck supple.   Skin:     General: Skin is warm.      Capillary Refill: Capillary refill takes less than 2 seconds.   Neurological:      General: No focal deficit present.      Mental Status: He is alert and oriented to person, place, and time.   Psychiatric:         Mood and Affect: Mood normal.         Behavior: Behavior normal.     Administrative Statements

## 2024-10-11 ENCOUNTER — APPOINTMENT (OUTPATIENT)
Dept: LAB | Facility: CLINIC | Age: 49
End: 2024-10-11
Payer: COMMERCIAL

## 2024-10-11 LAB — TSH SERPL DL<=0.05 MIU/L-ACNC: 1.56 UIU/ML (ref 0.45–4.5)

## 2024-10-20 DIAGNOSIS — E66.812 CLASS 2 OBESITY WITHOUT SERIOUS COMORBIDITY WITH BODY MASS INDEX (BMI) OF 36.0 TO 36.9 IN ADULT, UNSPECIFIED OBESITY TYPE: ICD-10-CM

## 2024-10-21 RX ORDER — SEMAGLUTIDE 2.4 MG/.75ML
INJECTION, SOLUTION SUBCUTANEOUS
Qty: 9 ML | Refills: 3 | Status: SHIPPED | OUTPATIENT
Start: 2024-10-21 | End: 2025-10-02

## 2024-12-29 NOTE — PATIENT INSTRUCTIONS

## 2025-01-02 ENCOUNTER — TELEPHONE (OUTPATIENT)
Age: 50
End: 2025-01-02

## 2025-01-02 NOTE — TELEPHONE ENCOUNTER
PA for  (Wegovy) 2.4 MG/0.75MLSUBMITTED to [employer]    via      [x]Surescripts-Case ID # [id number]      [x]PA sent as URGENT    All office notes, labs and other pertaining documents and studies sent. Clinical questions answered. Awaiting determination from insurance company.     Turnaround time for your insurance to make a decision on your Prior Authorization can take 7-21 business days.

## 2025-01-02 NOTE — TELEPHONE ENCOUNTER
PA for Wegovy 2.4 MG/0.75 ML APPROVED     Date(s) approved 1/2/2025-1/2/2026    Case #:[id number]     Patient advised by          []MyChart Message  [x]Phone call- patient informed of approval.   []LMOM  []L/M to call office as no active Communication consent on file  []Unable to leave detailed message as VM not approved on Communication consent       Pharmacy advised by    []Fax  [x]Phone call-Left message on provider line at [facility] [address] with PA approval info.     Approval letter scanned into [employer]

## 2025-01-09 DIAGNOSIS — E66.812 CLASS 2 OBESITY WITHOUT SERIOUS COMORBIDITY WITH BODY MASS INDEX (BMI) OF 36.0 TO 36.9 IN ADULT, UNSPECIFIED OBESITY TYPE: ICD-10-CM

## 2025-01-09 RX ORDER — SEMAGLUTIDE 2.4 MG/.75ML
INJECTION, SOLUTION SUBCUTANEOUS
Qty: 9 ML | Refills: 3 | Status: SHIPPED | OUTPATIENT
Start: 2025-01-09 | End: 2025-12-21

## 2025-01-09 NOTE — TELEPHONE ENCOUNTER
Pharmacy change due to price     Reason for call:   [x] Refill   [] Prior Auth  [] Other:     Office:   [x] PCP/Provider - Aysha Pierre  [] Specialty/Provider -     Medication: Wegovy     Dose/Frequency: 2.4 mg Weekly    Quantity: 9 ml     Pharmacy: [facility] [address]    Does the patient have enough for 3 days?   [x] Yes   [] No - Send as HP to POD

## 2025-03-03 DIAGNOSIS — E66.812 CLASS 2 OBESITY WITHOUT SERIOUS COMORBIDITY WITH BODY MASS INDEX (BMI) OF 36.0 TO 36.9 IN ADULT, UNSPECIFIED OBESITY TYPE: ICD-10-CM

## 2025-03-03 DIAGNOSIS — E06.3 HASHIMOTO'S THYROIDITIS: ICD-10-CM

## 2025-03-04 RX ORDER — LEVOTHYROXINE SODIUM 125 UG/1
125 TABLET ORAL DAILY
Qty: 90 TABLET | Refills: 1 | Status: SHIPPED | OUTPATIENT
Start: 2025-03-04

## 2025-03-04 RX ORDER — SEMAGLUTIDE 2.4 MG/.75ML
INJECTION, SOLUTION SUBCUTANEOUS
Qty: 9 ML | Refills: 0 | Status: SHIPPED | OUTPATIENT
Start: 2025-03-04 | End: 2026-02-12

## 2025-03-27 ENCOUNTER — PATIENT MESSAGE (OUTPATIENT)
Dept: FAMILY MEDICINE CLINIC | Facility: CLINIC | Age: 50
End: 2025-03-27

## 2025-03-28 ENCOUNTER — APPOINTMENT (OUTPATIENT)
Dept: LAB | Facility: CLINIC | Age: 50
End: 2025-03-28
Payer: COMMERCIAL

## 2025-03-28 ENCOUNTER — NURSE TRIAGE (OUTPATIENT)
Age: 50
End: 2025-03-28

## 2025-03-28 ENCOUNTER — HOSPITAL ENCOUNTER (OUTPATIENT)
Dept: RADIOLOGY | Facility: HOSPITAL | Age: 50
Discharge: HOME | End: 2025-03-28
Payer: COMMERCIAL

## 2025-03-28 ENCOUNTER — OFFICE VISIT (OUTPATIENT)
Dept: FAMILY MEDICINE CLINIC | Facility: CLINIC | Age: 50
End: 2025-03-28
Payer: COMMERCIAL

## 2025-03-28 VITALS
BODY MASS INDEX: 32.2 KG/M2 | OXYGEN SATURATION: 98 % | SYSTOLIC BLOOD PRESSURE: 120 MMHG | TEMPERATURE: 97 F | RESPIRATION RATE: 17 BRPM | HEIGHT: 73 IN | WEIGHT: 243 LBS | HEART RATE: 60 BPM | DIASTOLIC BLOOD PRESSURE: 86 MMHG

## 2025-03-28 DIAGNOSIS — M25.571 ACUTE RIGHT ANKLE PAIN: Primary | ICD-10-CM

## 2025-03-28 DIAGNOSIS — E03.9 ACQUIRED HYPOTHYROIDISM: ICD-10-CM

## 2025-03-28 DIAGNOSIS — Z00.8 OTHER SPECIFIED GENERAL MEDICAL EXAMINATION: ICD-10-CM

## 2025-03-28 DIAGNOSIS — M25.561 ACUTE PAIN OF RIGHT KNEE: ICD-10-CM

## 2025-03-28 DIAGNOSIS — E66.9 OBESITY (BMI 30-39.9): ICD-10-CM

## 2025-03-28 DIAGNOSIS — G47.33 OSA (OBSTRUCTIVE SLEEP APNEA): ICD-10-CM

## 2025-03-28 LAB
ALBUMIN SERPL BCG-MCNC: 5.1 G/DL (ref 3.5–5)
ALP SERPL-CCNC: 66 U/L (ref 34–104)
ALT SERPL W P-5'-P-CCNC: 27 U/L (ref 7–52)
ANION GAP SERPL CALCULATED.3IONS-SCNC: 7 MMOL/L (ref 4–13)
AST SERPL W P-5'-P-CCNC: 22 U/L (ref 13–39)
BASOPHILS # BLD AUTO: 0.03 THOUSANDS/ÂΜL (ref 0–0.1)
BASOPHILS NFR BLD AUTO: 1 % (ref 0–1)
BILIRUB SERPL-MCNC: 0.77 MG/DL (ref 0.2–1)
BUN SERPL-MCNC: 10 MG/DL (ref 5–25)
CALCIUM SERPL-MCNC: 9.7 MG/DL (ref 8.4–10.2)
CHLORIDE SERPL-SCNC: 102 MMOL/L (ref 96–108)
CHOLEST SERPL-MCNC: 177 MG/DL (ref ?–200)
CO2 SERPL-SCNC: 29 MMOL/L (ref 21–32)
CREAT SERPL-MCNC: 0.92 MG/DL (ref 0.6–1.3)
EOSINOPHIL # BLD AUTO: 0.05 THOUSAND/ÂΜL (ref 0–0.61)
EOSINOPHIL NFR BLD AUTO: 1 % (ref 0–6)
ERYTHROCYTE [DISTWIDTH] IN BLOOD BY AUTOMATED COUNT: 14.3 % (ref 11.6–15.1)
EST. AVERAGE GLUCOSE BLD GHB EST-MCNC: 100 MG/DL
GFR SERPL CREATININE-BSD FRML MDRD: 98 ML/MIN/1.73SQ M
GLUCOSE P FAST SERPL-MCNC: 85 MG/DL (ref 65–99)
HBA1C MFR BLD: 5.1 %
HCT VFR BLD AUTO: 47.1 % (ref 36.5–49.3)
HDLC SERPL-MCNC: 58 MG/DL
HGB BLD-MCNC: 16.5 G/DL (ref 12–17)
IMM GRANULOCYTES # BLD AUTO: 0.04 THOUSAND/UL (ref 0–0.2)
IMM GRANULOCYTES NFR BLD AUTO: 1 % (ref 0–2)
LDLC SERPL CALC-MCNC: 108 MG/DL (ref 0–100)
LYMPHOCYTES # BLD AUTO: 1.68 THOUSANDS/ÂΜL (ref 0.6–4.47)
LYMPHOCYTES NFR BLD AUTO: 25 % (ref 14–44)
MCH RBC QN AUTO: 31.5 PG (ref 26.8–34.3)
MCHC RBC AUTO-ENTMCNC: 35 G/DL (ref 31.4–37.4)
MCV RBC AUTO: 90 FL (ref 82–98)
MONOCYTES # BLD AUTO: 0.48 THOUSAND/ÂΜL (ref 0.17–1.22)
MONOCYTES NFR BLD AUTO: 7 % (ref 4–12)
NEUTROPHILS # BLD AUTO: 4.37 THOUSANDS/ÂΜL (ref 1.85–7.62)
NEUTS SEG NFR BLD AUTO: 65 % (ref 43–75)
NONHDLC SERPL-MCNC: 119 MG/DL
NRBC BLD AUTO-RTO: 0 /100 WBCS
PLATELET # BLD AUTO: 231 THOUSANDS/UL (ref 149–390)
PMV BLD AUTO: 11.4 FL (ref 8.9–12.7)
POTASSIUM SERPL-SCNC: 3.9 MMOL/L (ref 3.5–5.3)
PROT SERPL-MCNC: 7.6 G/DL (ref 6.4–8.4)
RBC # BLD AUTO: 5.23 MILLION/UL (ref 3.88–5.62)
SODIUM SERPL-SCNC: 138 MMOL/L (ref 135–147)
TRIGL SERPL-MCNC: 54 MG/DL (ref ?–150)
WBC # BLD AUTO: 6.65 THOUSAND/UL (ref 4.31–10.16)

## 2025-03-28 PROCEDURE — 73562 X-RAY EXAM OF KNEE 3: CPT

## 2025-03-28 PROCEDURE — 83036 HEMOGLOBIN GLYCOSYLATED A1C: CPT

## 2025-03-28 PROCEDURE — 99214 OFFICE O/P EST MOD 30 MIN: CPT | Performed by: FAMILY MEDICINE

## 2025-03-28 PROCEDURE — 80061 LIPID PANEL: CPT

## 2025-03-28 RX ORDER — METHYLPREDNISOLONE 4 MG/1
TABLET ORAL
Qty: 21 EACH | Refills: 0 | Status: SHIPPED | OUTPATIENT
Start: 2025-03-28

## 2025-03-28 NOTE — PROGRESS NOTES
Name: Nick Julian      : 1975      MRN: [id number]  Encounter Provider: Aysha Pierre MD  Encounter Date: 3/28/2025   Encounter department: [facility]  :  Assessment & Plan  Acute right ankle pain  Stable exam   Suspect strain   To start PT  Orders:  •  Ambulatory Referral to Physical Therapy; Future  •  methylPREDNISolone 4 MG tablet therapy pack; Use as directed on package    Acute pain of right knee  Normal exam   Full ROM   To start PT  If the pain continues, may need MRI  Orders:  •  Ambulatory Referral to Physical Therapy; Future  •  methylPREDNISolone 4 MG tablet therapy pack; Use as directed on package  •  XR knee 3 vw right non injury; Future    Obesity (BMI 30-39.9)  Stable on wegovy   Continue diet, exercise and portion control        Acquired hypothyroidism  Stable on levothyroxine        VIJI (obstructive sleep apnea)  Stable  Doesn't need CPAP at this time since he lost weight              History of Present Illness     in late 2025 he went for a hike and got his right foot caught between a couple of rocks, and fell.   Was better  and started to have right knee and ankle pain again two weeks ago   He has been staying active and makes it worse when he does a lot of walking and putting pressure on it  Tried ibuprofen which takes the edge off       Knee Pain   The injury mechanism was an eversion injury. The pain is present in the right knee. The pain is mild. The pain has been Intermittent since onset. Pertinent negatives include no inability to bear weight, loss of motion, loss of sensation, muscle weakness, numbness or tingling. He reports no foreign bodies present. The symptoms are aggravated by movement and weight bearing. He has tried non-weight bearing and NSAIDs for the symptoms. The treatment provided mild relief.     Review of Systems   Constitutional: Negative.    HENT: Negative.     Eyes: Negative.    Respiratory: Negative.     Genitourinary: Negative.   "  Musculoskeletal:  Positive for arthralgias. Negative for joint swelling.   Neurological:  Negative for tingling and numbness.       Objective   /86 (BP Location: Left arm, Patient Position: Sitting, Cuff Size: Standard)   Pulse 60   Temp (!) 97 °F (36.1 °C) (Tympanic)   Resp 17   Ht 6' 1.07\" (1.856 m)   Wt 110 kg (243 lb)   SpO2 98%   BMI 32.00 kg/m²      Physical Exam  Constitutional:       Appearance: Normal appearance.   Cardiovascular:      Rate and Rhythm: Normal rate and regular rhythm.      Pulses: Normal pulses.      Heart sounds: Normal heart sounds.   Pulmonary:      Effort: Pulmonary effort is normal.      Breath sounds: Normal breath sounds.   Musculoskeletal:         General: No swelling, tenderness, deformity or signs of injury.   Neurological:      General: No focal deficit present.      Mental Status: He is alert and oriented to person, place, and time.         "

## 2025-03-28 NOTE — TELEPHONE ENCOUNTER
"Regarding: possible leg injury  ----- Message from Cora WOOTEN sent at 3/28/2025  8:43 AM EDT -----  Message in My Chart:  \"Quick question,   in late Jan I went for a hike and got my right foot caught between a couple of rocks, and fell.  Leg was bent at really weird angle and I swear I  was going to hear a crack or a pop but it never came.   There was minimal swelling and I was able to finish the hike no problem.  Fast forward to now and ankle and knee have starting to bother me.  Weather is warming up and I'm heading out doors to hike, run and ride.   I doubt I broke anything as I'm able to function normally but am concerned that I might cause serious injury as I push myself to do all the things I want to do.   Should I go to PT? Should I get some xrays and see you or go to urgent care?  What do you think?\"    "

## 2025-03-31 ENCOUNTER — RESULTS FOLLOW-UP (OUTPATIENT)
Dept: FAMILY MEDICINE CLINIC | Facility: CLINIC | Age: 50
End: 2025-03-31

## 2025-04-01 ENCOUNTER — RESULTS FOLLOW-UP (OUTPATIENT)
Dept: FAMILY MEDICINE CLINIC | Facility: CLINIC | Age: 50
End: 2025-04-01

## 2025-04-07 ENCOUNTER — EVALUATION (OUTPATIENT)
Dept: PHYSICAL THERAPY | Facility: CLINIC | Age: 50
End: 2025-04-07
Payer: COMMERCIAL

## 2025-04-07 DIAGNOSIS — M25.571 ACUTE RIGHT ANKLE PAIN: ICD-10-CM

## 2025-04-07 DIAGNOSIS — M25.561 ACUTE PAIN OF RIGHT KNEE: ICD-10-CM

## 2025-04-07 PROCEDURE — 97161 PT EVAL LOW COMPLEX 20 MIN: CPT

## 2025-04-07 PROCEDURE — 97110 THERAPEUTIC EXERCISES: CPT

## 2025-04-07 PROCEDURE — 97112 NEUROMUSCULAR REEDUCATION: CPT

## 2025-04-07 NOTE — PROGRESS NOTES
PT Evaluation     Today's date: 2025  Patient name: Nick Julian  : 1975  MRN: [id number]  Referring provider: Aysha Pierre MD  Dx:   Encounter Diagnosis     ICD-10-CM    1. Acute right ankle pain  M25.571 Ambulatory Referral to Physical Therapy      2. Acute pain of right knee  M25.561 Ambulatory Referral to Physical Therapy          Start Time: 730  Stop Time: 823  Total time in clinic (min): 53 minutes    Assessment/Plan  Assessment Details:  Nick Julian is a 49 y.o. male presenting to PT with signs and symptoms consistent with a low grade R MCL sprain and a R lateral ankle sprain. Pt's familiar sx's were reproduced in session with valgus stress testing, resisted ankle EV, and functional movements such as single leg heel raises and anterior step down testing. Pt demonstrates minimal edema and low sx irritability. Upon examination, pt demonstrates primary impairments including pain, decreased functional strength, and decreased ability to maintain single leg balance. Said impairments are limiting pt's ability to perform ADL's, functional movements, recreational exercise, social participation without pain or compensation. Therefore, pt will benefit from skilled PT aimed to improve the observed primary impairments and facilitate pt's return to PLOF. No further referral appears necessary at this time based upon examination results.    Sx Irritability: low   Impairments: Pain, decreased strength, decreased ROM, decreased joint mobility  Primary Impairment List:    1.) Functional strength deficits (anterior step down and single leg heel raise)  2.) Pain  3.) impaired single leg balance    Prognosis: Good  Prognosis details: Positive and negative prognostic indicator(s):  positive attitude about recovery and prior success with conservative care  Understanding of Dx, Px, and POC: good    Plan:  Frequency: 2 visits per week  Duration in weeks: 6 weeks  Total duration in visits: 12 visits  POC  expiration: 6 weeks - 5/19/2025  Planned therapy interventions: activity modification, manual therapy, neuromuscular re-education, patient education, therapeutic activities, therapeutic exercise, graded activity, home exercise program, and modalities PRN.    Goals:    STG (to be met in 3 weeks):  Pt will be independent with basic HEP for self-management.  Pt will demonstrate </=55cm with figure 8 ankle girth measurements.  Pt will achieve >/=80% LSI with SL calf raises to demonstrate improved strength.    LTG (to be met by discharge):  Pt will be independent with comprehensive HEP for maintenance after discharge.   Pt will self report a FOTO score >/= their predicted value of 84 and 86 for ankle and knee intakes respectively.  Pt will demonstrate </=55cm with figure 8 ankle girth measurements.  Pt will achieve >/=90% LSI with SL calf raises to demonstrate improved strength.      Subjective  History of Present Illness:  Nick Julian presents to PT with primary c/o R ankle and R knee pain reporting initial onset ~ 2 months ago after he got his foot caught between some rocks while hiking and fell. 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 Pt also notes pain in the R anterior shin when running up hill.    JENNIFER:Fall  Pain location: R lateral/anterior ankle and medial knee  Pain description:  ache and sharp  24hr pain pattern: 0/10 (current), 0/10 (best), 7/10 (worst)   Aggravating/Alleviating Factors: change in direction, running (uphill worse). Kneel to stand/ibuprofen  Activity Limitations: recreational activities and work-related activities  Sleeping: no disturbed sleep reported   Imaging: x-ray's for knee indicating OA  Previous treatments: none  PMHx impacting POC: n/a  Occupation: Nurse in ER  Patients concerns/goals: Prole boarding in the middle of June, return to  "pain free running (5k) decreasing pain, improving function, improving strength, improving balance/stability, remaining active, and preventing re-occurrence  Special Questions: (-) Red flag screening        Objective    Edema:  L: figure 8 girth: 56 cm  R: figure 8 girth: 55 cm     ROM Measures:   L Knee AROM:   Flex: WNL and symmetrical   Ext: WNL and symmetrical       R Knee AROM:   Flex: WNL and symmetrical    Ext: WNL and symmetrical      L Ankle A/PROM:             DF: WNL/8  PF: WNL    INV: WNL/20   EV: WNL      R Ankle A/ROM:   DF: WNL/10  PF: WNL    INV:WNL/ 30 p!  EV: WNL      Palpation:    Structures (+) for tenderness: n/a    Structures (-) for tenderness: Medial/Lateral tibiofemoral joint line, pes anserine, distal adductors, peroneals, ATFL     MMT Strength Measures:   L Knee MMT's:   Ext: 5/5   Flex: 5/5         R Knee MMT’s:   Ext: 5/5   Flex: 5/5    L Ankle MMT's:   DF: 5/5   PF: 5/5   INV: 5/5   EV: 5/5      R Ankle MMT’s:    DF: 5/5   PF: 5/5   INV: 5/5   EV: 5/5 p!    Joint Mobility:   L Talocrural Joint: WNL   R Talocrural Joint: WNL      Special Tests:    R Valgus stress test: (+) for p!  Lachman's: (-)  Anterior drawer: (-)     Functional Strength Testing:   DL heel raise: 30+   L SL heel raise: 37  R SL heel raise: 15 p! In ankle  L 6\" anterior step down testin  R 6\" anterior step down testing 3 p! In knee            Diagnosis: R MCL sprain, low grade lateral ankle sprain, MTSS   Precautions: n/a   POC:    [employer] Access Code:  [email or web address]    Visit Count 1 2 3 4 5 6 7 8 9 10     Date             Manuals                                                    Neuro Re-Ed             Pt education AG            Ankle inv/ev yielding iso             Mod side plank             LE Y balance reaches                                       There Ex              Active warm up             Lateral step down 2x10            FFE SL CR 2x10            Bent knee CR             Ankle EV in PF position 2x10 "            Side steps TB @ FF 2x10 GTB                                             Ther Act                  Ant step down                                                                             Modalities                                                    Pt education: Pt was educated regarding PT exam findings, Dx, Px, goals, planned interventions to address impairments, appropriate activity limitations, pain monitoring scale concepts, strategies for sx management, and was provided an HEP at the end of session.

## 2025-04-11 ENCOUNTER — APPOINTMENT (OUTPATIENT)
Dept: PHYSICAL THERAPY | Facility: CLINIC | Age: 50
End: 2025-04-11
Payer: COMMERCIAL

## 2025-04-11 DIAGNOSIS — M25.561 ACUTE PAIN OF RIGHT KNEE: ICD-10-CM

## 2025-04-11 DIAGNOSIS — M25.571 ACUTE RIGHT ANKLE PAIN: Primary | ICD-10-CM

## 2025-04-11 NOTE — PROGRESS NOTES
"Daily Note     Today's date: 2025  Patient name: Nick Julian  : 1975  MRN: [id number]  Referring provider: Aysha Pierre MD  Dx:   Encounter Diagnosis     ICD-10-CM    1. Acute right ankle pain  M25.571       2. Acute pain of right knee  M25.561                      Subjective: ***      Objective: See treatment diary below    L DF lunge test: *** cm  R DF lunge test: *** cm    Assessment: Tx initiated with active warm up and HEP review. Tx focused on *** and initiating *** movements with the intention of ***. Pt tolerated treatment well and without adverse reactions. Pt will benefit form skilled PT focused to progress movement tolerance and tissue specific loading volume/intensity as tolerated.          Plan: Progress treatment as tolerated.         Diagnosis: R MCL sprain, low grade lateral ankle sprain, MTSS   Precautions: n/a   POC: -   [employer] Access Code:  [email or web address]    Visit Count 1 2 3 4 5 6 7 8 9 10     Date            Manuals                                                    Neuro Re-Ed             Pt education AG            Ankle ev yielding iso  4x30\"           Mod side plank  3x20\"           LE Y balance reaches  3x5 ea                                     There Ex              Active warm up             Lateral step down 2x10 6\"            FFE SL CR 2x10            Bent knee CR  3x12-15 ***#           Ankle EV in PF position 2x10            Side steps TB @ FF 2x10 GTB                                             Ther Act                  Ant step down             Post step downs  3x8 ***\"                                                                           Modalities                                                    Pt education: Pt was educated regarding PT exam findings, Dx, Px, goals, planned interventions to address impairments, appropriate activity limitations, pain monitoring scale concepts, strategies for sx management, and was provided an HEP at the end of " session.

## 2025-04-16 ENCOUNTER — APPOINTMENT (OUTPATIENT)
Dept: PHYSICAL THERAPY | Facility: CLINIC | Age: 50
End: 2025-04-16
Payer: COMMERCIAL

## 2025-04-21 ENCOUNTER — OFFICE VISIT (OUTPATIENT)
Dept: PHYSICAL THERAPY | Facility: CLINIC | Age: 50
End: 2025-04-21
Payer: COMMERCIAL

## 2025-04-21 DIAGNOSIS — M25.571 ACUTE RIGHT ANKLE PAIN: Primary | ICD-10-CM

## 2025-04-21 DIAGNOSIS — M25.561 ACUTE PAIN OF RIGHT KNEE: ICD-10-CM

## 2025-04-21 PROCEDURE — 97110 THERAPEUTIC EXERCISES: CPT

## 2025-04-21 PROCEDURE — 97140 MANUAL THERAPY 1/> REGIONS: CPT

## 2025-04-21 PROCEDURE — 97112 NEUROMUSCULAR REEDUCATION: CPT

## 2025-04-21 NOTE — PROGRESS NOTES
"Daily Note     Today's date: 2025  Patient name: Nick Julian  : 1975  MRN: [id number]  Referring provider: Aysha Pierre MD  Dx:   Encounter Diagnosis     ICD-10-CM    1. Acute right ankle pain  M25.571       2. Acute pain of right knee  M25.561           Start Time: 1029  Stop Time: 1124  Total time in clinic (min): 55 minutes    Subjective: Pt reports 4-5/10 R lateral ankle pain with home exercises, and stopped after 3-4 days post initial evaluation. Pt feels that ankle eversion was the exercises that led to increased pain.      Objective: See treatment diary below    Assessment: Tx initiated with active warm up and HEP review. Tx focused on educating pt regarding appropriate activity modifications and expected sx response as it relates to pt's diagnosis.Tx regressed intensity of GSC complex strengthening/ankle stabilization interventions and pt was educated to hold exercises until next visit. Pt will benefit form skilled PT focused to progress movement tolerance and tissue specific loading volume/intensity as tolerated.          Plan: Progress treatment as tolerated.   Progress external loads and initiate SL balance pending pt's sx response.       Diagnosis: R MCL sprain, low grade lateral ankle sprain, MTSS   Precautions: n/a   POC: -   [employer] Access Code:  [email or web address]    Visit Count 1 2 3 4 5 6 7 8 9 10     Date            Manuals             PA TC joint mobilizations  AG                                     Neuro Re-Ed             Pt education AG AG           Ankle ev yielding iso  3x30\" GTB           Mod side plank  3x10+ short level lift           LE Y balance reaches  nv           Slider series  nv                        There Ex              Active warm up  TM 5' 3mph           DL CR  3x10           Lateral step down 2x10 6\"            FFE SL CR 2x10 nv           Bent knee CR  nv           Ankle EV in PF position 2x10            Side steps TB @ FF 2x10 GTB nv         " "  Posterior step downs  2x10 6\"           Kneeling ankle DF stretch  10x10\"                                                         Ther Act                  Ant step down             Post step downs                                                                             Modalities                  Ice pack  10' R ankle                                Pt education: Session reviewed updated HEP program and concepts including appropriate activity limitations, pain monitoring scale concepts, and strategies for sx management.           "

## 2025-04-24 ENCOUNTER — OFFICE VISIT (OUTPATIENT)
Dept: PHYSICAL THERAPY | Facility: CLINIC | Age: 50
End: 2025-04-24
Payer: COMMERCIAL

## 2025-04-24 DIAGNOSIS — M25.561 ACUTE PAIN OF RIGHT KNEE: ICD-10-CM

## 2025-04-24 DIAGNOSIS — M25.571 ACUTE RIGHT ANKLE PAIN: Primary | ICD-10-CM

## 2025-04-24 PROCEDURE — 97140 MANUAL THERAPY 1/> REGIONS: CPT

## 2025-04-24 PROCEDURE — 97112 NEUROMUSCULAR REEDUCATION: CPT

## 2025-04-24 PROCEDURE — 97110 THERAPEUTIC EXERCISES: CPT

## 2025-04-24 NOTE — PROGRESS NOTES
"Daily Note     Today's date: 2025  Patient name: Nick Julian  : 1975  MRN: [id number]  Referring provider: Aysha Pierre MD  Dx:   Encounter Diagnosis     ICD-10-CM    1. Acute right ankle pain  M25.571       2. Acute pain of right knee  M25.561           Start Time: 1215  Stop Time: 1300  Total time in clinic (min): 45 minutes    Subjective: Pt denied any complications or increased pain with home exercises.Pt continues to note pain after most prominent after working long hours reporting 4-5/10 L lateral ankle pain approaching 10 hours at work.    Objective: See treatment diary below    Assessment: Tx initiated with active warm up and HEP review. Tx focused on promoting ankle DF ROM in WB positions. Progressed GSC complex strengthening this visit though held isolated ankle eversion strengthening given pt's sx irritability level. Pt will benefit form skilled PT focused to progress movement tolerance and tissue specific loading volume/intensity as tolerated.          Plan: Progress treatment as tolerated.   Progress external loads and initiate SL balance interventions pending pt's sx response.       Diagnosis: R MCL sprain, low grade lateral ankle sprain, MTSS   Precautions: n/a   POC: -   [employer] Access Code:  [email or web address]    Visit Count 1 2 3 4 5 6 7 8 9 10     Date           Manuals             PA TC joint mobilizations  AG AG                                    Neuro Re-Ed             Pt education [facility]           Ankle ev yielding iso  3x30\" GTB 3x30\" GTB          Mod side plank  3x10+ short level lift nv          LE Y balance reaches  nv           3 slider series   nv 2x5 ea                       There Ex              Active warm up  TM 5' 3mph TM 5' 3mph          DL CR  3x10           Anterior step down    3x8 4\"           Lateral step down 2x10 6\"            FFE SL CR 2x10 nv 3x8          Bent knee CR  nv 3x12 90lbs          Ankle EV in PF position 2x10            Side steps TB " "@ FF 2x10 GTB nv           Posterior step downs  2x10 6\"           Kneeling ankle DF stretch  10x10\" 3x5\" ea 3 way                                                        Ther Act                  Ant step down             Post step downs                                                                             Modalities                  Ice pack  10' R ankle 10' R ankle          Laser                                  Pt education:           "

## 2025-04-28 ENCOUNTER — OFFICE VISIT (OUTPATIENT)
Dept: PHYSICAL THERAPY | Facility: CLINIC | Age: 50
End: 2025-04-28
Payer: COMMERCIAL

## 2025-04-28 DIAGNOSIS — M25.561 ACUTE PAIN OF RIGHT KNEE: ICD-10-CM

## 2025-04-28 DIAGNOSIS — M25.571 ACUTE RIGHT ANKLE PAIN: Primary | ICD-10-CM

## 2025-04-28 PROCEDURE — 97010 HOT OR COLD PACKS THERAPY: CPT

## 2025-04-28 PROCEDURE — 97140 MANUAL THERAPY 1/> REGIONS: CPT

## 2025-04-28 PROCEDURE — 97110 THERAPEUTIC EXERCISES: CPT

## 2025-04-28 PROCEDURE — 97112 NEUROMUSCULAR REEDUCATION: CPT

## 2025-04-28 NOTE — PROGRESS NOTES
"Daily Note     Today's date: 2025  Patient name: Nick Julian  : 1975  MRN: [id number]  Referring provider: Aysha Pierre MD  Dx:   Encounter Diagnosis     ICD-10-CM    1. Acute right ankle pain  M25.571       2. Acute pain of right knee  M25.561           Start Time: 1031  Stop Time: 1115  Total time in clinic (min): 44 minutes    Subjective:Pt's spouse bumped into his ankle over the weekend which resulted in increased pain and pt therefore held home exercises. Pt reports 2/10 ankle pain over the past 24 hours.    Objective: See treatment diary below    Assessment: Tx initiated with active warm up and manuals for sx modulation. Tx focused on promoting ankle DF ROM in weightbearing positions. Held SL balance interventions this visit due to pt's intolerance to SL weight bearing. Pt however demonstrated improved tolerance to loading given that he was able to perform 6\" anterior step downs with min UE support. Pt will benefit form skilled PT focused to progress movement tolerance and tissue specific loading volume/intensity as tolerated.          Plan: Progress treatment as tolerated.   Progress external loads and initiate SL balance interventions pending pt's sx response.       Diagnosis: R MCL sprain, low grade lateral ankle sprain, MTSS   Precautions: n/a   POC: -   [employer] Access Code:  [email or web address]    Visit Count 1 2 3 4 5 6 7 8 9 10     Date          Manuals             PA TC joint mobilizations  AG AG AG                                   Neuro Re-Ed             Pt education AG AG           Ankle ev yielding iso  3x30\" GTB 3x30\" GTB 3x30\" GTB         Mod side plank  3x10+ short level lift nv 3x10+ short level lift         LE Y balance reaches  nv  nv         3 slider series   nv 2x5 ea w/ dowel support 2x5 ea w/ dowel support                      There Ex              Active warm up  TM 5' 3mph TM 5' 3mph TM 5' 3mph         DL CR  3x10           Gastroc stretch    3x30\"      " "   Anterior step down    3x8 4\"  3x8 6\" 2 finger UE support         Lateral step down 2x10 6\"            FFE SL CR 2x10 nv 3x8          Bent knee CR  nv 3x12 90lbs          Ankle EV in PF position 2x10            Side steps TB @ FF 2x10 GTB nv           Posterior step downs  2x10 6\"           Kneeling ankle DF stretch  10x10\" 3x5\" ea 3 way 3x5\" ea 3 way         Ankle INV/EV    2x10 RTB         SL knee ext    3x10 33lbs                             Ther Act                  Ant step down             Post step downs                                                                             Modalities                  Ice pack  10' R ankle 10' R ankle 10' R ankle         Laser                                  Pt education:           "

## 2025-05-01 ENCOUNTER — OFFICE VISIT (OUTPATIENT)
Dept: PHYSICAL THERAPY | Facility: CLINIC | Age: 50
End: 2025-05-01
Payer: COMMERCIAL

## 2025-05-01 DIAGNOSIS — M25.561 ACUTE PAIN OF RIGHT KNEE: Primary | ICD-10-CM

## 2025-05-01 DIAGNOSIS — M25.571 ACUTE RIGHT ANKLE PAIN: ICD-10-CM

## 2025-05-01 PROCEDURE — 97110 THERAPEUTIC EXERCISES: CPT

## 2025-05-01 PROCEDURE — 97112 NEUROMUSCULAR REEDUCATION: CPT

## 2025-05-01 NOTE — PROGRESS NOTES
"Daily Note     Today's date: 2025  Patient name: Nick Julian  : 1975  MRN: [id number]  Referring provider: Aysha Pierre MD  Dx:   Encounter Diagnosis     ICD-10-CM    1. Acute pain of right knee  M25.561       2. Acute right ankle pain  M25.571                      Subjective: Pt reports 1/10 ankle pain over the past 24 hours.Pt was able to play pickle ball for 2 hours wearing his ankle brace with little to no pain.     Objective: See treatment diary below    Assessment: Per subjective report, pt demonstrates improved tolerance to loading/activity and was therefore educated to attempt 10-15 min jogging with brace. Tx focused on promoting ankle DF ROM in weightbearing positions. Initiated SL balance interventions this visit.  Pt will benefit form skilled PT focused to progress movement tolerance and tissue specific loading volume/intensity as tolerated.          Plan: Progress treatment as tolerated.   Progress external loads and initiate SL balance interventions pending pt's sx response.       Diagnosis: R MCL sprain, low grade lateral ankle sprain, MTSS   Precautions: n/a   POC: -   [employer] Access Code:  [email or web address]    Visit Count 1 2 3 4 5 6 7 8 9 10     Date         Manuals             PA TC joint mobilizations  AG AG AG AG                                  Neuro Re-Ed             Pt education AG AG   AG        Ankle ev yielding iso  3x30\" GTB 3x30\" GTB 3x30\" GTB         Mod side plank  3x10+ short level lift nv 3x10+ short level lift 2x10+ long lever        LE Y balance reaches  nv  nv 3x5 w/ dowel UE support        3 slider series   nv 2x5 ea w/ dowel support 2x5 ea w/ dowel support         kickstand                                       There Ex              Active warm up  TM 5' 3mph TM 5' 3mph TM 5' 3mph TM 5' 3mph        DL CR  3x10           Gastroc stretch    3x30\"         Anterior step down    3x8 4\"  3x8 6\" 2 finger UE support         Lateral step down 2x10 6\" " "           FFE SL CR 2x10 nv 3x8  2x15        Bent knee CR  nv 3x12 90lbs          Ankle EV in PF position 2x10            Side steps TB @ FF 2x10 GTB nv           Posterior step downs  2x10 6\"   2x8 12\" heel float        Kneeling ankle DF stretch  10x10\" 3x5\" ea 3 way 3x5\" ea 3 way 10x5\" ea 3 way 15# KB (no KB medially)        Ankle INV/EV    2x10 RTB 3x6 GTB        SL knee ext    3x10 33lbs                             Ther Act                  Ant step down             Post step downs                                                                             Modalities                  Ice pack  10' R ankle 10' R ankle 10' R ankle 10' R ankle        Laser                                  Pt education:           "

## 2025-05-05 ENCOUNTER — OFFICE VISIT (OUTPATIENT)
Dept: PHYSICAL THERAPY | Facility: CLINIC | Age: 50
End: 2025-05-05
Attending: FAMILY MEDICINE
Payer: COMMERCIAL

## 2025-05-05 DIAGNOSIS — M25.571 ACUTE RIGHT ANKLE PAIN: Primary | ICD-10-CM

## 2025-05-05 DIAGNOSIS — M25.561 ACUTE PAIN OF RIGHT KNEE: ICD-10-CM

## 2025-05-05 PROCEDURE — 97110 THERAPEUTIC EXERCISES: CPT

## 2025-05-05 PROCEDURE — 97112 NEUROMUSCULAR REEDUCATION: CPT

## 2025-05-05 NOTE — PROGRESS NOTES
"Daily Note     Today's date: 2025  Patient name: Nick Julian  : 1975  MRN: [id number]  Referring provider: Aysha Pierre MD  Dx:   Encounter Diagnosis     ICD-10-CM    1. Acute right ankle pain  M25.571       2. Acute pain of right knee  M25.561           Start Time: 0812  Stop Time: 0857  Total time in clinic (min): 45 minutes    Subjective: Pt reports 1/10 ankle pain over the past 24 hours.Pt was able to jog for 10 min while wearing the ankle brace without complication.    Objective: See treatment diary below    Assessment: Per subjective report, pt demonstrates improved tolerance to loading/activity and was therefore educated to progress jogging duration by 5 min per week. Initiated step and sticks without ankle brace to promote tolerance to variable speed profiles. Pt tolerated tx well and denied any adverse sx response at end of session. Pt will benefit form skilled PT focused to progress movement tolerance and tissue specific loading volume/intensity as tolerated.          Plan: Progress treatment as tolerated.   Progress external loads and initiate SL balance interventions pending pt's sx response.       Diagnosis: R MCL sprain, low grade lateral ankle sprain, MTSS   Precautions: n/a   POC: -   [employer] Access Code:  [email or web address]    Visit Count 1 2 3 4 5 6 7 8 9 10     Date        Manuals             PA TC joint mobilizations  AG AG AG AG AG                                 Neuro Re-Ed             Pt education AG AG   AG        Ankle ev yielding iso  3x30\" GTB 3x30\" GTB 3x30\" GTB         Mod side plank  3x10+ short level lift nv 3x10+ short level lift 2x10+ long lever        LE Y balance reaches  nv  nv 3x5 w/ dowel UE support 3x5 w/ dowel UE support       3 slider series   nv 2x5 ea w/ dowel support 2x5 ea w/ dowel support         Heel Float Kickstand RDL      2x8       hop and sticks      3x10 yards fwd                                              There Ex        " "      Active warm up  TM 5' 3mph TM 5' 3mph TM 5' 3mph TM 5' 3mph TM 5' 3mph       DL CR  3x10           Gastroc stretch    3x30\"         Anterior step down    3x8 4\"  3x8 6\" 2 finger UE support  3x10 6\" no UE support        Lateral step down 2x10 6\"            FFE SL CR 2x10 nv 3x8  2x15 2x15       Bent knee CR  nv 3x12 90lbs          Ankle EV in PF position 2x10            Side steps TB @ FF 2x10 GTB nv           Posterior step downs  2x10 6\"   2x8 12\" heel float        Kneeling ankle DF stretch  10x10\" 3x5\" ea 3 way 3x5\" ea 3 way 10x5\" ea 3 way 15# KB (no KB medially) 10x5\" ea 3 way 15# KB (no KB medially)       Split squat      Wall tc Nv        Ankle INV/EV    2x10 RTB 3x6 GTB HEP       SL knee ext    3x10 33lbs         Lateral box squat       3x8 12\" box+2 foam pads                                        Ther Act                  Ant step down             Post step downs                                                                             Modalities                  Ice pack  10' R ankle 10' R ankle 10' R ankle 10' R ankle 10' R ankle       Laser                                  Pt education:           "

## 2025-05-09 ENCOUNTER — APPOINTMENT (OUTPATIENT)
Dept: PHYSICAL THERAPY | Facility: CLINIC | Age: 50
End: 2025-05-09
Attending: FAMILY MEDICINE
Payer: COMMERCIAL

## 2025-05-13 ENCOUNTER — APPOINTMENT (OUTPATIENT)
Dept: PHYSICAL THERAPY | Facility: CLINIC | Age: 50
End: 2025-05-13
Attending: FAMILY MEDICINE
Payer: COMMERCIAL

## 2025-05-19 ENCOUNTER — OFFICE VISIT (OUTPATIENT)
Dept: PHYSICAL THERAPY | Facility: CLINIC | Age: 50
End: 2025-05-19
Attending: FAMILY MEDICINE
Payer: COMMERCIAL

## 2025-05-19 DIAGNOSIS — M25.561 ACUTE PAIN OF RIGHT KNEE: Primary | ICD-10-CM

## 2025-05-19 DIAGNOSIS — M25.571 ACUTE RIGHT ANKLE PAIN: ICD-10-CM

## 2025-05-19 PROCEDURE — 97112 NEUROMUSCULAR REEDUCATION: CPT

## 2025-05-19 PROCEDURE — 97110 THERAPEUTIC EXERCISES: CPT

## 2025-05-19 NOTE — PROGRESS NOTES
"Daily Note     Today's date: 2025  Patient name: Nick Julian  : 1975  MRN: [id number]  Referring provider: Aysha Pierre MD  Dx:   Encounter Diagnosis     ICD-10-CM    1. Acute pain of right knee  M25.561       2. Acute right ankle pain  M25.571           Start Time: 0821  Stop Time: 0915  Total time in clinic (min): 54 minutes    Subjective: Pt reports 0/10 R ankle and knee pain over the past 24 hours.Pt was able [place] jog [place]. Pt reports feeling that he is 100% progressed towards PT goals and feels his functional ability is equivalent to PLOF.    Objective: See treatment diary below    L Ankle A/PROM:             DF: WNL/8  PF: WNL    INV: WNL/28  EV: WNL      R Ankle A/ROM:   DF: WNL/10  PF: WNL    INV:WNL/ 30   EV: WNL      MMT Strength Measures:   L Knee MMT's:   Ext: 5/5   Flex: 5/5         R Knee MMT’s:   Ext: 5/5   Flex: 5/5    L Ankle MMT's:   DF: 5/5   PF: 5/5   INV: 5/5   EV: 5/5      R Ankle MMT’s:    DF: 5/5   PF: 5/5   INV: 5/5   EV: 5/5     Functional Strength Testing:   L SL heel raise: 37  R SL heel raise: 30  L 6\" anterior step down testin  R 6\" anterior step down testing 20     Assessment: Nick Julian is a 49 y.o. male who has been attending PT for 7 sessions over the course of 6 weeks 2/2 R knee and ankle pain. Pt has made excellent progress in PT given pt's improved perceived level of function evidenced by pt scoring > their initial predicted foto outcome measure value. Upon examination, pt demonstrated improved ankle DF ROM and functional strength with step down testing which is an improvement compared to initial evaluation. At this time, pt denies functional limitations and is able to run and exercise recreationally without pain or compensation. Given pt's satisfaction with progress made towards goals and improved functional capacity, pt is appropriate for discharge from physical therapy. Session reviewed " "concepts including appropriate activity limitations, pain monitoring scale concepts, strategies for sx management, and pt was provided with an updated comprehensive HEP program.      Plan: DC       Diagnosis: R MCL sprain, low grade lateral ankle sprain, MTSS   Precautions: n/a   POC: 4/7-5/19   [employer] Access Code:  [email or web address]    Visit Count 1 2 3 4 5 6 7 8 9 10     Date 4/7 4/21 4/24 4/28 5/1 5/5 5/20      Manuals             PA TC joint mobilizations  AG AG AG AG AG AG                                Neuro Re-Ed             Pt education AG AG   AG        Ankle ev yielding iso  3x30\" GTB 3x30\" GTB 3x30\" GTB         Mod side plank  3x10+ short level lift nv 3x10+ short level lift 2x10+ long lever        LE Y balance reaches  nv  nv 3x5 w/ dowel UE support 3x5 w/ dowel UE support 3x5 w/ dowel UE support      3 slider series   nv 2x5 ea w/ dowel support 2x5 ea w/ dowel support         Heel Float Kickstand RDL      2x8 2x8      hop and sticks      3x10 yards fwd 3x10 yards fwd                                             There Ex              Active warm up  TM 5' 3mph TM 5' 3mph TM 5' 3mph TM 5' 3mph TM 5' 3mph TM 5' 3mph      DL CR  3x10           Gastroc stretch    3x30\"         Anterior step down    3x8 4\"  3x8 6\" 2 finger UE support  3x10 6\" no UE support  3x10 6\" no UE support       Lateral step down 2x10 6\"            FFE SL CR 2x10 nv 3x8  2x15 2x15 2x15      Bent knee CR  nv 3x12 90lbs          Ankle EV in PF position 2x10            Side steps TB @ FF 2x10 GTB nv           Posterior step downs  2x10 6\"   2x8 12\" heel float        Kneeling ankle DF stretch  10x10\" 3x5\" ea 3 way 3x5\" ea 3 way 10x5\" ea 3 way 15# KB (no KB medially) 10x5\" ea 3 way 15# KB (no KB medially) 10x5\" ea 3 way 15# KB (no KB medially)      Split squat      Wall tc Nv  [id number] tc      Ankle INV/EV    2x10 RTB 3x6 GTB HEP       SL knee ext    3x10 33lbs         Lateral box squat       3x8 12\" box+2 foam pads 3x8 12\" box+2 foam pad           "                             Ther Act                  Ant step down             Post step downs                                                                             Modalities                  Ice pack  10' R ankle 10' R ankle 10' R ankle 10' R ankle 10' R ankle       Laser                                  Pt education:

## 2025-05-22 ENCOUNTER — APPOINTMENT (OUTPATIENT)
Dept: PHYSICAL THERAPY | Facility: CLINIC | Age: 50
End: 2025-05-22
Attending: FAMILY MEDICINE
Payer: COMMERCIAL

## 2025-05-27 ENCOUNTER — APPOINTMENT (OUTPATIENT)
Dept: PHYSICAL THERAPY | Facility: CLINIC | Age: 50
End: 2025-05-27
Attending: FAMILY MEDICINE
Payer: COMMERCIAL

## 2025-05-30 ENCOUNTER — APPOINTMENT (OUTPATIENT)
Dept: PHYSICAL THERAPY | Facility: CLINIC | Age: 50
End: 2025-05-30
Attending: FAMILY MEDICINE
Payer: COMMERCIAL

## 2025-06-05 DIAGNOSIS — E66.812 CLASS 2 OBESITY WITHOUT SERIOUS COMORBIDITY WITH BODY MASS INDEX (BMI) OF 36.0 TO 36.9 IN ADULT, UNSPECIFIED OBESITY TYPE: ICD-10-CM

## 2025-06-05 RX ORDER — SEMAGLUTIDE 2.4 MG/.75ML
INJECTION, SOLUTION SUBCUTANEOUS
Qty: 3 ML | Refills: 0 | Status: SHIPPED | OUTPATIENT
Start: 2025-06-05

## 2025-06-27 ENCOUNTER — OFFICE VISIT (OUTPATIENT)
Dept: FAMILY MEDICINE CLINIC | Facility: CLINIC | Age: 50
End: 2025-06-27
Payer: COMMERCIAL

## 2025-06-27 VITALS
WEIGHT: 243.4 LBS | BODY MASS INDEX: 32.26 KG/M2 | SYSTOLIC BLOOD PRESSURE: 110 MMHG | OXYGEN SATURATION: 100 % | RESPIRATION RATE: 17 BRPM | HEIGHT: 73 IN | DIASTOLIC BLOOD PRESSURE: 70 MMHG | HEART RATE: 84 BPM | TEMPERATURE: 97.4 F

## 2025-06-27 DIAGNOSIS — E06.3 HASHIMOTO'S THYROIDITIS: ICD-10-CM

## 2025-06-27 DIAGNOSIS — E66.9 OBESITY (BMI 30-39.9): ICD-10-CM

## 2025-06-27 DIAGNOSIS — F51.01 PRIMARY INSOMNIA: Primary | ICD-10-CM

## 2025-06-27 PROCEDURE — 99214 OFFICE O/P EST MOD 30 MIN: CPT | Performed by: FAMILY MEDICINE

## 2025-06-27 NOTE — PROGRESS NOTES
"Name: Nick Julian      : 1975      MRN: [id number]  Encounter Provider: Aysha Pierre MD  Encounter Date: 2025   Encounter [facility]  :  Assessment & Plan  Primary insomnia  Stable on lunesta prn        Hashimoto's thyroiditis/hypothyroidism  Due for labs   Ordered it today     Orders:  •  TSH, 3rd generation with Free T4 reflex    Obesity (BMI 30-39.9)  Initial weight: 300 lbs  Current weight: 243 lbs   Diet, exercise and portion control   Wegovy as directed                History of Present Illness   HPI  He is taking 6 tablets of 125 mcg all at once - once a week x 4 weeks   No side effects from the meds    Review of Systems   Constitutional: Negative.    HENT: Negative.     Eyes: Negative.    Respiratory: Negative.     Cardiovascular: Negative.    Genitourinary: Negative.    Musculoskeletal: Negative.    Neurological: Negative.    Hematological: Negative.    Psychiatric/Behavioral: Negative.         Objective   /70 (BP Location: Left arm, Patient Position: Sitting, Cuff Size: Standard)   Pulse 84   Temp (!) 97.4 °F (36.3 °C) (Tympanic)   Resp 17   Ht 6' 1.07\" (1.856 m)   Wt 110 kg (243 lb 6.4 oz)   SpO2 100%   BMI 32.05 kg/m²      Physical Exam  Vitals and nursing note reviewed.   Constitutional:       Appearance: Normal appearance.     Cardiovascular:      Rate and Rhythm: Normal rate and regular rhythm.      Pulses: Normal pulses.      Heart sounds: Normal heart sounds.   Pulmonary:      Effort: Pulmonary effort is normal.      Breath sounds: Normal breath sounds.     Neurological:      General: No focal deficit present.      Mental Status: He is alert and oriented to person, place, and time.     Psychiatric:         Mood and Affect: Mood normal.         Behavior: Behavior normal.         "

## 2025-06-27 NOTE — ASSESSMENT & PLAN NOTE
Initial weight: 300 lbs  Current weight: 243 lbs   Diet, exercise and portion control   Wegovy as directed

## 2025-07-07 ENCOUNTER — APPOINTMENT (OUTPATIENT)
Dept: LAB | Facility: CLINIC | Age: 50
End: 2025-07-07
Payer: COMMERCIAL

## 2025-07-07 LAB — TSH SERPL DL<=0.05 MIU/L-ACNC: 3.82 UIU/ML (ref 0.45–4.5)

## 2025-07-16 DIAGNOSIS — E66.812 CLASS 2 OBESITY WITHOUT SERIOUS COMORBIDITY WITH BODY MASS INDEX (BMI) OF 36.0 TO 36.9 IN ADULT, UNSPECIFIED OBESITY TYPE: ICD-10-CM

## 2025-07-16 RX ORDER — SEMAGLUTIDE 2.4 MG/.75ML
INJECTION, SOLUTION SUBCUTANEOUS
Qty: 3 ML | Refills: 0 | Status: SHIPPED | OUTPATIENT
Start: 2025-07-16

## 2025-08-01 NOTE — TELEPHONE ENCOUNTER
Hpi Title: Evaluation of Skin Lesions Patient left message he did not get CPAP machine yet  The DME RX was sent to [facility] on 12/27/2019  He called [facility] and they told him he needs a corrected RX      I forwarded this information to Higinio Pulido our [facility] representative     Phylicia please advise

## (undated) DEVICE — TIBURON SPLIT SHEET: Brand: CONVERTORS

## (undated) DEVICE — GRASPER ATRAUMATIC FEN 5MM X 33CM ROTATING THUMB LOOP GENI-SURGE

## (undated) DEVICE — 3M™ TEGADERM™ TRANSPARENT FILM DRESSING FRAME STYLE, 1628, 6 IN X 8 IN (15 CM X 20 CM), 10/CT 8CT/CASE: Brand: 3M™ TEGADERM™

## (undated) DEVICE — GLOVE SRG BIOGEL ORTHOPEDIC 7.5

## (undated) DEVICE — STRL UNIVERSAL MINOR GENERAL: Brand: CARDINAL HEALTH

## (undated) DEVICE — SUT MONOCRYL 4-0 PS-2 18 IN Y496G

## (undated) DEVICE — HEAVY DUTY TABLE COVER: Brand: CONVERTORS

## (undated) DEVICE — NEEDLE SPINAL 20G X 3.5 LF

## (undated) DEVICE — 1200CC GUARDIAN II: Brand: GUARDIAN

## (undated) DEVICE — IV SET 15 DROP STERILE 0/Y GRAVITY

## (undated) DEVICE — GAUZE SPONGES,16 PLY: Brand: CURITY

## (undated) DEVICE — Device: Brand: MEDEX

## (undated) DEVICE — SUT VICRYL 0 UR-6 27 IN J603H

## (undated) DEVICE — STRL COTTON TIP APPLCTR 6IN PK: Brand: CARDINAL HEALTH

## (undated) DEVICE — VISUALIZATION SYSTEM: Brand: CLEARIFY

## (undated) DEVICE — MEGA SUTURECUT ND: Brand: ENDOWRIST

## (undated) DEVICE — INSUFLATION TUBING INSUFLOW (LEXION)

## (undated) DEVICE — ULTRASOUND GEL STERILE FOIL PK

## (undated) DEVICE — FIBER PROC KIT GOLD TIP 21G 45CM NEVERTOUCH

## (undated) DEVICE — TIP COVER ACCESSORY

## (undated) DEVICE — INTENDED FOR TISSUE SEPARATION, AND OTHER PROCEDURES THAT REQUIRE A SHARP SURGICAL BLADE TO PUNCTURE OR CUT.: Brand: BARD-PARKER SAFETY BLADES SIZE 11, STERILE

## (undated) DEVICE — TRAY FOLEY 16FR URIMETER SURESTEP

## (undated) DEVICE — ENDOPATH XCEL BLADELESS TROCARS WITH STABILITY SLEEVES: Brand: ENDOPATH XCEL

## (undated) DEVICE — 3M™ IOBAN™ 2 ANTIMICROBIAL INCISE DRAPE 6650EZ: Brand: IOBAN™ 2

## (undated) DEVICE — ADHESIVE SKN CLSR HISTOACRYL FLEX 0.5ML LF

## (undated) DEVICE — TONGUE DEPRESSOR STERILE

## (undated) DEVICE — CLAMP TOWEL TUBING DISPOSABLE

## (undated) DEVICE — SYRINGE 10ML LL

## (undated) DEVICE — ENDOPATH 5MM CURVED SCISSORS WITH MONOPOLAR CAUTERY: Brand: ENDOPATH

## (undated) DEVICE — CHLORAPREP HI-LITE 26ML ORANGE

## (undated) DEVICE — VIOLET BRAIDED (POLYGLACTIN 910), SYNTHETIC ABSORBABLE SUTURE: Brand: COATED VICRYL

## (undated) DEVICE — GLOVE SRG BIOGEL ECLIPSE 6

## (undated) DEVICE — GLOVE INDICATOR PI UNDERGLOVE SZ 8 BLUE

## (undated) DEVICE — Device: Brand: OMNICLOSE TROCAR SITE CLOSURE DEVICE

## (undated) DEVICE — COLUMN DRAPE

## (undated) DEVICE — 3000CC GUARDIAN II: Brand: GUARDIAN

## (undated) DEVICE — STOPCOCK 3-WAY

## (undated) DEVICE — DRAPE SHEET THREE QUARTER

## (undated) DEVICE — DRAPE FLUID WARMER (BIRD BATH)

## (undated) DEVICE — ENDOPATH XCEL UNIVERSAL TROCAR STABLILITY SLEEVES: Brand: ENDOPATH XCEL

## (undated) DEVICE — SUT ETHIBOND 0 SH 30 IN X834H

## (undated) DEVICE — INTENDED FOR TISSUE SEPARATION, AND OTHER PROCEDURES THAT REQUIRE A SHARP SURGICAL BLADE TO PUNCTURE OR CUT.: Brand: BARD-PARKER ® CARBON RIB-BACK BLADES

## (undated) DEVICE — ACE WRAP 6 IN STERILE

## (undated) DEVICE — KERLIX BANDAGE ROLL: Brand: KERLIX

## (undated) DEVICE — MONOPOLAR CURVED SCISSORS: Brand: ENDOWRIST

## (undated) DEVICE — GLOVE INDICATOR PI UNDERGLOVE SZ 6.5 BLUE

## (undated) DEVICE — NEEDLE 25G X 1 1/2

## (undated) DEVICE — PACK PBDS LAP CHOLE RF

## (undated) DEVICE — ARM DRAPE

## (undated) DEVICE — FENESTRATED BIPOLAR FORCEPS: Brand: ENDOWRIST

## (undated) DEVICE — DRAPE LAPAROTOMY W/POUCHES

## (undated) DEVICE — LUBRICANT INST ELECTROLUBE ANTISTK WO PAD

## (undated) DEVICE — SYRINGE 20ML LL

## (undated) DEVICE — ENDOPATH PNEUMONEEDLE INSUFFLATION NEEDLES WITH LUER LOCK CONNECTORS 120MM: Brand: ENDOPATH

## (undated) DEVICE — 3M™ COBAN™ NL STERILE NON-LATEX SELF-ADHERENT WRAP, 2084S, 4 IN X 5 YD (10 CM X 4,5 M), 18 ROLLS/CASE: Brand: 3M™ COBAN™

## (undated) DEVICE — COVER PROBE INTRAOPERATIVE 6 X 96 IN

## (undated) DEVICE — CANNULA SEAL

## (undated) DEVICE — DRAPE SHEET X-LG

## (undated) DEVICE — ACE WRAP 4 IN STERILE